# Patient Record
Sex: FEMALE | Race: BLACK OR AFRICAN AMERICAN | Employment: FULL TIME | URBAN - NONMETROPOLITAN AREA
[De-identification: names, ages, dates, MRNs, and addresses within clinical notes are randomized per-mention and may not be internally consistent; named-entity substitution may affect disease eponyms.]

---

## 2021-03-19 ENCOUNTER — HOSPITAL ENCOUNTER (EMERGENCY)
Age: 56
Discharge: HOME | End: 2021-03-19
Attending: EMERGENCY MEDICINE | Admitting: EMERGENCY MEDICINE
Payer: OTHER GOVERNMENT

## 2021-03-19 VITALS
HEIGHT: 60 IN | SYSTOLIC BLOOD PRESSURE: 132 MMHG | RESPIRATION RATE: 18 BRPM | DIASTOLIC BLOOD PRESSURE: 81 MMHG | BODY MASS INDEX: 30.23 KG/M2 | OXYGEN SATURATION: 98 % | TEMPERATURE: 98.3 F | WEIGHT: 154 LBS | HEART RATE: 74 BPM

## 2021-03-19 DIAGNOSIS — S61.214A LACERATION OF RIGHT RING FINGER WITHOUT FOREIGN BODY WITHOUT DAMAGE TO NAIL, INITIAL ENCOUNTER: Primary | ICD-10-CM

## 2021-03-19 PROCEDURE — 74011000250 HC RX REV CODE- 250: Performed by: EMERGENCY MEDICINE

## 2021-03-19 PROCEDURE — 90471 IMMUNIZATION ADMIN: CPT

## 2021-03-19 PROCEDURE — 74011250636 HC RX REV CODE- 250/636: Performed by: EMERGENCY MEDICINE

## 2021-03-19 PROCEDURE — 90714 TD VACC NO PRESV 7 YRS+ IM: CPT | Performed by: EMERGENCY MEDICINE

## 2021-03-19 PROCEDURE — 99283 EMERGENCY DEPT VISIT LOW MDM: CPT

## 2021-03-19 RX ORDER — AMLODIPINE BESYLATE 5 MG/1
5 TABLET ORAL DAILY
COMMUNITY
Start: 2021-02-16

## 2021-03-19 RX ORDER — BACITRACIN 500 UNIT/G
1 PACKET (EA) TOPICAL
Status: COMPLETED | OUTPATIENT
Start: 2021-03-19 | End: 2021-03-19

## 2021-03-19 RX ADMIN — BACITRACIN 1 PACKET: 500 OINTMENT TOPICAL at 19:26

## 2021-03-19 RX ADMIN — TETANUS AND DIPHTHERIA TOXOIDS ADSORBED 0.5 ML: 2; 2 INJECTION INTRAMUSCULAR at 19:24

## 2021-03-19 NOTE — ED TRIAGE NOTES
Pt cut right ring finger while hanging picture. Site is bleeding moderately. Pt rates pain at 10/10 with throbbing qualities.

## 2021-03-19 NOTE — ED PROVIDER NOTES
EMERGENCY DEPARTMENT HISTORY AND PHYSICAL EXAM      Date: 3/19/2021  Patient Name: Jared Lobo    History of Presenting Illness     Chief Complaint   Patient presents with    Laceration       History Provided By: Patient    HPI: Jared Lobo, 48 y.o. female with a past medical history significant hypertension presents to the ED with cc of finger laceration. Installing shelving suffered laceration to the right fourth finger. Minor abrasions to other fingers    There are no other complaints, changes, or physical findings at this time. PCP: No primary care provider on file. No current facility-administered medications on file prior to encounter. Current Outpatient Medications on File Prior to Encounter   Medication Sig Dispense Refill    amLODIPine (NORVASC) 5 mg tablet TAKE 1 TABLET BY MOUTH ONCE DAILY         Past History     Past Medical History:  Past Medical History:   Diagnosis Date    Hypertension        Past Surgical History:  Past Surgical History:   Procedure Laterality Date    HX ORTHOPAEDIC      Right knee       Family History:  History reviewed. No pertinent family history. Social History:  Social History     Tobacco Use    Smoking status: Never Smoker    Smokeless tobacco: Never Used   Substance Use Topics    Alcohol use: Never     Frequency: Never    Drug use: Not on file       Allergies:  No Known Allergies      Review of Systems   Review of all other systems negative  Review of Systems   Constitutional: Negative. HENT: Negative. Eyes: Negative. Respiratory: Negative for cough, chest tightness, shortness of breath and wheezing. Cardiovascular: Negative for chest pain, palpitations and leg swelling. Gastrointestinal: Negative for abdominal distention, abdominal pain, blood in stool, constipation, diarrhea, nausea and vomiting. Endocrine: Negative. Genitourinary: Negative for difficulty urinating, dysuria, flank pain, frequency and hematuria. Musculoskeletal: Negative. Neurological: Negative for dizziness, seizures, speech difficulty, weakness and numbness. Hematological: Negative. Psychiatric/Behavioral: Negative. Physical Exam   Pleasant middle-aged female no acute distress  Physical Exam  Vitals signs and nursing note reviewed. Constitutional:       Appearance: Normal appearance. She is not toxic-appearing. HENT:      Head: Normocephalic and atraumatic. Musculoskeletal:         General: Signs of injury present. Comments: 1 cm laceration distal fourth finger wound approximates well is not actively bleeding   Neurological:      Mental Status: She is alert. Lab and Diagnostic Study Results     Labs -   No results found for this or any previous visit (from the past 12 hour(s)). Radiologic Studies -   @lastxrresult@  CT Results  (Last 48 hours)    None        CXR Results  (Last 48 hours)    None            Medical Decision Making   - I am the first provider for this patient. - I reviewed the vital signs, available nursing notes, past medical history, past surgical history, family history and social history. - Initial assessment performed. The patients presenting problems have been discussed, and they are in agreement with the care plan formulated and outlined with them. I have encouraged them to ask questions as they arise throughout their visit. Vital Signs-Reviewed the patient's vital signs.   Patient Vitals for the past 12 hrs:   Temp Pulse Resp BP SpO2   03/19/21 1841     98 %   03/19/21 1838 98.3 °F (36.8 °C) 74 18 132/81 98 %       Records Reviewed: Nursing Notes and Old Medical Records    The patient presents with laceration finger with a differential diagnosis of foreign body tendon tendon injury vascular compromise      ED Course:          Provider Notes (Medical Decision Making):   1 cm laceration distal fourth finger wound approximates well will treat conservatively with dressing  MDM Procedures   Medical Decision Makingedical Decision Making  Performed by: Emily Toledo MD  PROCEDURES:none  Procedures       Disposition   Disposition: Condition stable  DC- Adult Discharges: All of the diagnostic tests were reviewed and questions answered. Diagnosis, care plan and treatment options were discussed. The patient understands the instructions and will follow up as directed. The patients results have been reviewed with them. They have been counseled regarding their diagnosis. The patient verbally convey understanding and agreement of the signs, symptoms, diagnosis, treatment and prognosis and additionally agrees to follow up as recommended with their PCP in 24 - 48 hours. They also agree with the care-plan and convey that all of their questions have been answered. I have also put together some discharge instructions for them that include: 1) educational information regarding their diagnosis, 2) how to care for their diagnosis at home, as well a 3) list of reasons why they would want to return to the ED prior to their follow-up appointment, should their condition change. DISCHARGE PLAN:  1. Current Discharge Medication List      CONTINUE these medications which have NOT CHANGED    Details   amLODIPine (NORVASC) 5 mg tablet TAKE 1 TABLET BY MOUTH ONCE DAILY           2. Follow-up Information    None       3. Return to ED if worse   4. Current Discharge Medication List            Diagnosis     Clinical Impression: Laceration finger no  suture  Attestations:    Emily Toledo MD    Please note that this dictation was completed with [employer], the computer voice recognition software. Quite often unanticipated grammatical, syntax, homophones, and other interpretive errors are inadvertently transcribed by the computer software. Please disregard these errors. Please excuse any errors that have escaped final proofreading. Thank you.

## 2021-04-28 ENCOUNTER — HOSPITAL ENCOUNTER (OUTPATIENT)
Dept: PHYSICAL THERAPY | Age: 56
Discharge: HOME | End: 2021-04-28
Payer: OTHER GOVERNMENT

## 2021-04-28 PROCEDURE — 97110 THERAPEUTIC EXERCISES: CPT

## 2021-04-28 PROCEDURE — 97161 PT EVAL LOW COMPLEX 20 MIN: CPT

## 2021-04-28 NOTE — PROGRESS NOTES
PT INITIAL EVALUATION NOTE - [facility] 2-15    Patient Name: Christy Orona  Date:2021  : 1965  [x]  Patient  Verified  Payor: SHOSHANA / Plan: Jhonny Chaudhary 74 / Product Type:  /    In time:1110  Out time:1155  Total Treatment Time (min): 45  Total Timed Codes (min): 15  1:1 Treatment Time ( [address] only): 45   Visit #: 1    Treatment Area: Pain in right knee [M25.561]    SUBJECTIVE  Pain Level (0-10 scale): 10/10  Any medication changes, allergies to medications, adverse drug reactions, diagnosis change, or new procedure performed?: [] No    [x] Yes (see summary sheet for update)  Subjective:    Pt reports she has struggled with knee pain off and on since  in her R knee. She has a debridement in  and has been treated with multiple injections and is now to the point where her surgeon has advised she undergo a right knee replacement. Her issues have been ongoing for so long however that her knee has become quite stiff and does not function well overall. Therefore the surgeon has suggested pre-op therapy to improve knee function to prevent the potential of post-operative complication. Her procedure is scheduled approximately 6 weeks from now.     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    Mechanism of Injury: degenerative  Previous Treatment/Compliance: good  Radiographs: X-rays show significant OA in the R knee  What increases symptoms: movement activity  What decreases symptoms: rest/ice  PMHx/Surgical Hx: pt has a history of right knee debridement surgery in  and has had multiple injections since that time  Past Medical History:   Diagnosis Date    Hypertension      Past Surgical History:   Procedure Laterality Date    HX ORTHOPAEDIC      Right knee     Work Hx: works as a home care aid  Living Situation: lives with    Pt Goals: to be able to walk and work without pain  Barriers: none  Motivation: good Substance use: none noted   Cognition: A & O x 4    Fall Assessment: none needed        OBJECTIVE/EXAMINATION  Posture:  WFL  Gait and Functional Mobility:  Antalgic right   Palpation: severe palpatory tenderness along the lateral aspect of the right knee    Hip:  Strength AROM PROM     Right Left Right Left Right Left    Flexion 4/5         Extension 3+/5         Abduction 4/5         Adduction 4+/5        Knee:  Strength AROM PROM     Right Left Right Left Right Left    Flexion 4/5  71  90     Extension 4/5  0  0      *All strength measures are on a scale with 5 as a maximum, if a space is left blank it was not tested. All ROM measurements are measured in degrees.     Joint Mobility Assessment: Pt has significant pain, tenderness, and stiffness along the lateral aspect of the joint    Flexibility: poor with flexion    Special Tests: Julienne: positive right                       15 min Therapeutic Exercise:  [x] See flow sheet :   Rationale: increase ROM and increase strength to improve the patients ability to perform functional mobility without pain and decrease stiffness pre-op          With   [x] TE   [] TA   [] neuro   [] other: Patient Education: [x] Provided HEP    [x] Progressed/Changed HEP based on:   [] positioning   [] body mechanics   [] transfers   [] heat/ice application    [] other:        Pain Level (0-10 scale) post treatment: 8/10    ASSESSMENT/Changes in Function:   [x]  See Plan of [address] Ch[address] Rios PT, DPT 4/28/2021

## 2021-04-28 NOTE — PROGRESS NOTES
[address], [address]  Ph: [phone]    Fax: [phone]    Plan of Care/Statement of Necessity for Physical Therapy Services  2-15    Patient name: Lexa Walton  : 1965  Provider#: [id number]  Referral source: Henrique Prieto MD      Medical/Treatment Diagnosis: Pain in right knee [M25.561]     Prior Hospitalization: see medical history     Comorbidities: see medical history  Prior Level of Function: independent community ambulator without AD need  Medications: Verified on Patient Summary List  Start of Care: 2021      Onset Date: chronic since    The Plan of Care and following information is based on the information from the initial evaluation. Assessment/ key information: Pt presents to outpatient physical therapy with right knee pain and stiffness.   Imaging has already indicated    Evaluation Complexity History LOW Complexity : Zero comorbidities / personal factors that will impact the outcome / POC; Examination LOW Complexity : 1-2 Standardized tests and measures addressing body structure, function, activity limitation and / or participation in recreation  ;Presentation LOW Complexity : Stable, uncomplicated  ;Clinical Decision Making TUG Score: 10 seconds  Overall Complexity Rating: LOW     Problem List: pain affecting function, decrease ROM, decrease strength, impaired gait/ balance, decrease activity tolerance and decrease flexibility/ joint mobility   Treatment Plan may include any combination of the following: Therapeutic exercise, Therapeutic activities, Neuromuscular re-education, Physical agent/modality, Gait/balance training, Manual therapy, Patient education, Functional mobility training and Stair training  Patient / Family readiness to learn indicated by: asking questions, trying to perform skills and interest  Persons(s) to be included in education: patient (P)  Barriers to Learning/Limitations: None  Patient Goal (s): to be able to work without pain  Patient Self Reported Health Status: excellent  Rehabilitation Potential: good    Short Term Goals: To be accomplished in 6 treatments:  1)  Pt to be independent with HEP  2)  Pt to improve R knee flexion by 10 degrees so that she can ambulate without an antalgic pattern    Long Term Goals: To be accomplished in 12 treatments:  1)  Pt to improve R LE Strength to WNL so 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 Patient/ Caregiver education and instruction: activity modification and exercises    [x]  Plan of care has been reviewed with PTA        Certification Period: 2021 to 2021    Holly Sifuentes, PT, DPT 2021     ________________________________________________________________________    I certify that the above Therapy Services are being furnished while the patient is under my care. I agree with the treatment plan and certify that this therapy is necessary.     Physician's Signature:____________________  Date:____________Time: _________    Patient name: Khalida Maloney  : 1965  Provider#: [id number]

## 2021-05-03 ENCOUNTER — HOSPITAL ENCOUNTER (OUTPATIENT)
Dept: PHYSICAL THERAPY | Age: 56
Discharge: HOME | End: 2021-05-03
Payer: OTHER GOVERNMENT

## 2021-05-03 PROCEDURE — 97016 VASOPNEUMATIC DEVICE THERAPY: CPT

## 2021-05-03 PROCEDURE — 97110 THERAPEUTIC EXERCISES: CPT

## 2021-05-03 NOTE — PROGRESS NOTES
PT DAILY TREATMENT NOTE - [facility] 2-15    Patient Name: Carola Banuelos  Date:5/3/2021  : 1965  [x]  Patient  Verified  Payor: SHOSHANA / Plan: Jhonny Chaudhary 74 / Product Type:  /    In time:1101  Out time:1158  Total Treatment Time (min): 57  Total Timed Codes (min): 57  1:1 Treatment Time ( only): 62   Visit #:  2    Treatment Area: Pain in right knee [M25.561]    SUBJECTIVE  Pain Level (0-10 scale): 7/10  Any medication changes, allergies to medications, adverse drug reactions, diagnosis change, or new procedure performed?: [x] No    [] Yes (see summary sheet for update)  Subjective functional status/changes:   [] No changes reported  \"That knee still hurts, even with the medicine I took this morning. \"    OBJECTIVE    Modality rationale: decrease edema, decrease inflammation, decrease pain, increase tissue extensibility and increase muscle contraction/control to improve the patients ability to ambulate without pain.    Min Type Additional Details       [] Estim: []Att   []Unatt    []TENS instruct                  []IFC  []Premod   []NMES                    []Other:  []w/US      []w/ heat  []w/ ice  Position:  Location:       []  Traction: [] Cervical       []Lumbar                       [] Prone          []Supine                       []Intermittent   []Continuous Lbs:  [] before manual  [] after manual  [] w/ heat  [] Simultaneously performed with w/ Estim    []  Ultrasound: []Continuous   [] Pulsed                       at: []1MHz   []3MHz Location:  W/cm2:    [] Paraffin         Location:   []w/heat    []  Ice     []  Heat  []  Ice massage Position:  Location:    []  Laser  []  Other: Position:  Location:      10 [x]  Vasopneumatic Device Pressure:       [] lo [x] med [] hi   [x] w/ ice      Temperature:  37  [] Simultaneously performed with w/ Estim     [x] Skin assessment post-treatment:  [x]intact []redness- no adverse reaction     []redness  adverse reaction:     57 min Therapeutic Exercise:  [] See flow sheet :   Rationale: increase ROM, increase strength, improve coordination, improve balance and increase proprioception to improve the patients ability to ambulate without pain. With   [x] TE   [] TA   [] neuro   [] other: Patient Education: [x] Review HEP    [] Progressed/Changed HEP based on:   [] positioning   [] body mechanics   [] transfers   [] heat/ice application    [] other:      Other Objective/Functional Measures: Cues required for correct technique during HEP review. Addition of marching on trampoline for warm-up and endurance training. Pain Level (0-10 scale) post treatment: 0/10    ASSESSMENT/Changes in Function:   The pt tolerated treatment treatment well today. HEP reviewed to facilitate compliance. Primary deficits are with right knee PROM flexion. Patient will continue to benefit from skilled PT services to modify and progress therapeutic interventions, address functional mobility deficits, address ROM deficits, address strength deficits, analyze and address soft tissue restrictions, analyze and modify body mechanics/ergonomics, address imbalance/dizziness and instruct in home and community integration to attain remaining goals     [x]  See Plan of Care  []  See progress note/recertification  []  See Discharge Summary         Progress towards goals / Updated goals:  Short Term Goals: To be accomplished in 6 treatments:  1)  Pt to be independent with HEP  2)  Pt to improve R knee flexion by 10 degrees so that she can ambulate without an antalgic pattern     Long Term Goals: To be accomplished in 12 treatments:  1)  Pt to improve R LE Strength to WNL so 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     PLAN  [x] Upgrade activities as tolerated     [x]  Continue plan of care  []  Update interventions per flow sheet       []  Discharge due to:_  []  Other:_      Eric Guzman, PT, DPT 5/3/2021

## 2021-05-06 ENCOUNTER — HOSPITAL ENCOUNTER (OUTPATIENT)
Dept: PHYSICAL THERAPY | Age: 56
Discharge: HOME | End: 2021-05-06
Payer: OTHER GOVERNMENT

## 2021-05-06 PROCEDURE — 97110 THERAPEUTIC EXERCISES: CPT

## 2021-05-06 PROCEDURE — 97014 ELECTRIC STIMULATION THERAPY: CPT

## 2021-05-06 PROCEDURE — 97016 VASOPNEUMATIC DEVICE THERAPY: CPT

## 2021-05-06 NOTE — PROGRESS NOTES
PT DAILY TREATMENT NOTE - [facility] 2-15    Patient Name: Alexa Pretty  Date:2021  : 1965  [x]  Patient  Verified  Payor:  / Plan: Messi Chapman / Product Type:  /    In time: 1:00 PM  Out time:1:55 PM  Total Treatment Time (min): 55  Total Timed Codes (min): 45  1:1 Treatment Time ( [address] only): 39   Visit #:  3    Treatment Area: Pain in right knee [M25.561]    SUBJECTIVE  Pain Level (0-10 scale): 0  Any medication changes, allergies to medications, adverse drug reactions, diagnosis change, or new procedure performed?: [x] No    [] Yes (see summary sheet for update)  Subjective functional status/changes:   [] No changes reported     I have to loosen my right knee before the knee replacement surgery. Otherwise, MD said the surgery will not work. I return to MD on 2021.      OBJECTIVE    Modality rationale: decrease pain and increase tissue extensibility to improve the patients ability toimprove R knee flexion by 10 degrees so that she can ambulate without an antalgic pattern     Min Type Additional Details       [] Estim: []Att   []Unatt    []TENS instruct                  []IFC  []Premod   []NMES                     []Other:  []w/US   []w/ice   []w/heat  Position:  Location:       []  Traction: [] Cervical       []Lumbar                       [] Prone          []Supine                       []Intermittent   []Continuous Lbs:  [] before manual  [] after manual  []w/heat    []  Ultrasound: []Continuous   [] Pulsed                       at: []1MHz   []3MHz Location:  W/cm2:    [] Paraffin         Location:   []w/heat    []  Ice     []  Heat  []  Ice massage Position:  Location:    []  Laser  []  Other: Position:  Location:     10 [x]  Vasopneumatic Device Pressure:       [x] lo [] med [] hi   Temperature: 34     [x] Skin assessment post-treatment:  [x]intact []redness- no adverse reaction    []redness  adverse reaction:     45 min Therapeutic Exercise:  [x] See flow sheet : Rationale: increase ROM, increase strength, improve coordination, improve balance and increase proprioception    to improve the patients ability to improve R knee flexion by 10 degrees so that she can ambulate without an antalgic pattern            With   [] TE   [] TA   [] Neuro   [] SC   [] other: Patient Education: [x] Review HEP    [] Progressed/Changed HEP based on:   [] positioning   [] body mechanics   [] transfers   [] heat/ice application    [] other:           Pain Level (0-10 scale) post treatment: 0    ASSESSMENT/Changes in Function:   Patient was experiencing no pain after treatment. Continue treatment with exercises in preparation for total knee replacement. Patient will continue to benefit from skilled PT services to address ROM deficits, address strength deficits and analyze and address soft tissue restrictions to attain remaining goals. 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     PLAN  []  Upgrade activities as tolerated     [x]  Continue plan of care  []  Update interventions per flow sheet       []  Discharge due to:_  []  Other:_      Barbara Mcmahan, PT 5/6/2021

## 2021-05-10 ENCOUNTER — HOSPITAL ENCOUNTER (OUTPATIENT)
Dept: PHYSICAL THERAPY | Age: 56
Discharge: HOME | End: 2021-05-10
Payer: OTHER GOVERNMENT

## 2021-05-10 PROCEDURE — 97110 THERAPEUTIC EXERCISES: CPT

## 2021-05-10 PROCEDURE — 97016 VASOPNEUMATIC DEVICE THERAPY: CPT

## 2021-05-10 NOTE — PROGRESS NOTES
PT DAILY TREATMENT NOTE - [facility] 2-15    Patient Name: Debora Ramírez  Date:5/10/2021  : 1965  [x]  Patient  Verified  Payor:  / Plan: Jhonny Chaudhary 75 / Product Type:  /    In time:11:02  Out time:12:02  Total Treatment Time (min): 60  Total Timed Codes (min): 50  1:1 Treatment Time ( [address] only): 50   Visit #: 4    Treatment Area: Pain in right knee [M25.561]    SUBJECTIVE  Pain Level (0-10 scale): 0/10  Any medication changes, allergies to medications, adverse drug reactions, diagnosis change, or new procedure performed?: [x] No    [] Yes (see summary sheet for update)  Subjective functional status/changes:   [x] No changes reported    OBJECTIVE  Modality rationale: decrease edema, decrease inflammation and decrease pain to improve the patients ability to perform ADLs with reduced pain.    Min Type Additional Details       [] Estim: []Att   []Unatt    []TENS instruct                  []IFC  []Premod   []NMES                    []Other:  []w/US      []w/ heat  []w/ ice  Position:  Location:       []  Traction: [] Cervical       []Lumbar                       [] Prone          []Supine                       []Intermittent   []Continuous Lbs:  [] before manual  [] after manual  [] w/ heat  [] Simultaneously performed with w/ Estim    []  Ultrasound: []Continuous   [] Pulsed                       at: []1MHz   []3MHz Location:  W/cm2:    [] Paraffin         Location:   []w/heat    []  Ice     []  Heat  []  Ice massage Position:  Location:    []  Laser  []  Other: Position:  Location:     10 [x]  Vasopneumatic Device Pressure:       [] lo [] med [x] hi   [x] w/ ice      Temperature: 35  [] Simultaneously performed with w/ Estim     [x] Skin assessment post-treatment:  [x]intact [x]redness- no adverse reaction     []redness  adverse reaction:     50 min Therapeutic Exercise:  [x] See flow sheet :   Rationale: increase ROM and increase strength to improve the patients ability to perform ADLs with reduced pain. With   [x] TE   [] TA   [] neuro   [] other: Patient Education: [x] Review HEP    [] Progressed/Changed HEP based on:   [] positioning   [] body mechanics   [] transfers   [] heat/ice application    [] other:      Pain Level (0-10 scale) post treatment: 0/10    ASSESSMENT/Changes in Function:   The pt tolerated treatment well. Pt is progressing well with AROM and strength of R knee for pre-operative management, but continues to have greater deficits with R knee flexion AROM more so than extension. Emphasis of treatment was on stretches and exercises to focus on promoting improved R knee ROM and flexibility. Pt requires verbal cueing for proper form with exercises for ideal stretch positioning and for new exercises. Continue to progress as able.  Patient will continue to benefit from skilled PT services to modify and progress therapeutic interventions, address functional mobility deficits, address ROM deficits, address strength deficits, analyze and address soft tissue restrictions, analyze and cue movement patterns and analyze and modify body mechanics/ergonomics to attain remaining goals     [x]  See Plan of Care  []  See progress note/recertification  []  See Discharge Summary         Progress towards goals / Updated goals:  Short Term Goals: To be accomplished in 6 treatments:  1)  Pt to be independent with HEP - Met  2)  Pt to improve R knee flexion by 10 degrees so that she can ambulate without an antalgic pattern     Long Term Goals: To be accomplished in 12 treatments:  1)  Pt to improve R LE Strength to WNL 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

## 2021-05-13 ENCOUNTER — HOSPITAL ENCOUNTER (OUTPATIENT)
Dept: PHYSICAL THERAPY | Age: 56
Discharge: HOME | End: 2021-05-13
Payer: OTHER GOVERNMENT

## 2021-05-13 PROCEDURE — 97110 THERAPEUTIC EXERCISES: CPT

## 2021-05-13 NOTE — PROGRESS NOTES
PT DAILY TREATMENT NOTE 2-15    Patient Name: Oswald Montano  Date:2021  : 1965  [x]  Patient  Verified  Payor: SHOSHANA / Plan: Jhonny Chaudhary 75 / Product Type:  /    In time: 1:00  Out time: 2:05  Total Treatment Time (min): 65  Visit #:  5    Treatment Area: Pain in right knee [M25.561]    SUBJECTIVE  Pain Level (0-10 scale): 0/10  Any medication changes, allergies to medications, adverse drug reactions, diagnosis change, or new procedure performed?: [x] No    [] Yes (see summary sheet for update)  Subjective functional status/changes:   [] No changes reported  \"I've been working on bending my knee.  I see the  .\"    OBJECTIVE    Modality rationale: decrease pain and increase tissue extensibility to improve the patients ability to be able to perform AROM   Min Type Additional Details       [] Estim: []Att   []Unatt    []TENS instruct                  []IFC  []Premod   []NMES                    []Other:  []w/US      []w/ heat  []w/ ice  Position:  Location:       []  Traction: [] Cervical       []Lumbar                       [] Prone          []Supine                       []Intermittent   []Continuous Lbs:  [] before manual  [] after manual  [] w/ heat  [] Simultaneously performed with w/ Estim    []  Ultrasound: []Continuous   [] Pulsed                       at: []1MHz   []3MHz Location:  W/cm2:    [] Paraffin         Location:   []w/heat   10 []  Ice     [x]  Heat  []  Ice massage Position: seated  Location: R knee    []  Laser  []  Other: Position:  Location:      []  Vasopneumatic Device Pressure:       [] lo [] med [] hi   [] w/ ice      Temperature:   [] Simultaneously performed with w/ Estim     [x] Skin assessment post-treatment:  [x]intact [x]redness- no adverse reaction    []redness  adverse reaction:       55 min Therapeutic Exercise:  [x] See flow sheet :   Rationale: increase ROM and increase strength to improve the patients ability to improve functional mobility         With   [x] TE   [] TA   [] neuro   [] other: Patient Education: [x] Review HEP    [] Progressed/Changed HEP based on:   [] positioning   [] body mechanics   [] transfers   [] heat/ice application    [] other:      Other Objective/Functional Measures: Added Biodex PROM    Pain Level (0-10 scale) post treatment: 0/10    ASSESSMENT/Changes in Function: Patient tolerated treatment fairly well. Focus on knee flexion due to increased limitations - extension is good. Trial of moist heat to R knee today instead of ice. Patient will continue to benefit from skilled PT services to address functional mobility deficits, address ROM deficits and address strength deficits to attain remaining goals.      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

## 2021-05-17 ENCOUNTER — HOSPITAL ENCOUNTER (OUTPATIENT)
Dept: PHYSICAL THERAPY | Age: 56
Discharge: HOME | End: 2021-05-17
Payer: OTHER GOVERNMENT

## 2021-05-17 PROCEDURE — 97110 THERAPEUTIC EXERCISES: CPT

## 2021-05-17 PROCEDURE — 97016 VASOPNEUMATIC DEVICE THERAPY: CPT

## 2021-05-17 PROCEDURE — 97140 MANUAL THERAPY 1/> REGIONS: CPT

## 2021-05-17 NOTE — PROGRESS NOTES
PT DAILY TREATMENT NOTE 2-15    Patient Name: Vaibhav Millard  Date:2021  : 1965  [x]  Patient  Verified  Payor:  / Plan: [facility] / Product Type:  /    In time: 11:02  Out time:12:10  Total Treatment Time (min): 68  Visit #: 6    Treatment Area: Pain in right knee [M25.561]    SUBJECTIVE  Pain Level (0-10 scale): 10/10  Any medication changes, allergies to medications, adverse drug reactions, diagnosis change, or new procedure performed?: [x] No    [] Yes (see summary sheet for update)  Subjective functional status/changes:   [] No changes reported  Pt reports that her R knee was swollen and painful all weekend. OBJECTIVE  Modality rationale: decrease edema, decrease inflammation and decrease pain to improve the patients ability to ambulate with improved AROM.    Min Type Additional Details       [] Estim: []Att   []Unatt    []TENS instruct                  []IFC  []Premod   []NMES                    []Other:  []w/US      []w/ heat  []w/ ice  Position:  Location:       []  Traction: [] Cervical       []Lumbar                       [] Prone          []Supine                       []Intermittent   []Continuous Lbs:  [] before manual  [] after manual  [] w/ heat  [] Simultaneously performed with w/ Estim    []  Ultrasound: []Continuous   [] Pulsed                       at: []1MHz   []3MHz Location:  W/cm2:    [] Paraffin         Location:   []w/heat    []  Ice     []  Heat  []  Ice massage Position:  Location:    []  Laser  []  Other: Position:  Location:     10 [x]  Vasopneumatic Device Pressure:       [] lo [] med [x] hi   [x] w/ ice      Temperature: 35  [] Simultaneously performed with w/ Estim     [x] Skin assessment post-treatment:  [x]intact [x]redness- no adverse reaction    []redness  adverse reaction:     48 min Therapeutic Exercise:  [x] See flow sheet :   Rationale: increase ROM and increase strength to improve the patients ability to ambulate with improved R knee AROM. 10 min Manual Therapy: Tibiofemoral jt distraction and knee flexion PROM provided to R knee. Muscle energy technique performed to quadriceps muscle to enhance knee flexion. Rationale: increase ROM and increase tissue extensibility  to improve the patients ability to ambulate with improved R knee AROM. With   [x] TE   [] TA   [] neuro   [] other: Patient Education: [x] Review HEP    [] Progressed/Changed HEP based on:   [] positioning   [] body mechanics   [] transfers   [] heat/ice application    [] other:      Other Objective Measures: Palpable quadriceps tightness noted on R compared to L. Increased hold time with heel slides to address quadriceps tightness. Pain Level (0-10 scale) post treatment: 0/10    ASSESSMENT/Changes in Function:   Patient tolerated treatment fairly well. Pt demonstrates limited knee flexion AROM today with exercises due to increased pain and stiffness over the weekend. Emphasis of treatment was on enhancing knee AROM, especially knee flexion, with the utilization of exercises as well as manual therapy techniques. Continue to progress as able with decreasing quadriceps tightness and enhancing R knee flexion AROM. Patient will continue to benefit from skilled PT services to modify and progress therapeutic interventions, address functional mobility deficits, address ROM deficits, address strength deficits, analyze and address soft tissue restrictions, analyze and cue movement patterns and analyze and modify body mechanics/ergonomics to attain remaining goals.      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

## 2021-05-20 ENCOUNTER — HOSPITAL ENCOUNTER (OUTPATIENT)
Dept: PHYSICAL THERAPY | Age: 56
Discharge: HOME | End: 2021-05-20
Payer: OTHER GOVERNMENT

## 2021-05-20 PROCEDURE — 97016 VASOPNEUMATIC DEVICE THERAPY: CPT

## 2021-05-20 PROCEDURE — 97140 MANUAL THERAPY 1/> REGIONS: CPT

## 2021-05-20 PROCEDURE — 97110 THERAPEUTIC EXERCISES: CPT

## 2021-05-20 NOTE — PROGRESS NOTES
PT DAILY TREATMENT NOTE 2-15    Patient Name: Salena Bowie  Date:2021  : 1965  [x]  Patient  Verified  Payor:  / Plan: Jhonny Chaudhary 74 / Product Type:  /    In time:1105 am   Out time:1208pm  Total Treatment Time (min): 63  Visit #: 7    Treatment Area: Pain in right knee [M25.561]    SUBJECTIVE  Pain Level (0-10 scale): 5/10  Any medication changes, allergies to medications, adverse drug reactions, diagnosis change, or new procedure performed?: [x] No    [] Yes (see summary sheet for update)  Subjective functional status/changes:   [] No changes reported  \"Pt reports still having issues with swelling and even with her working on her ex regularly. \"    OBJECTIVE      Modality rationale: decrease edema, decrease inflammation and decrease pain to improve the patients ability to increase AROM of R knee   Min Type Additional Details       [] Estim: []Att   []Unatt    []TENS instruct                  []IFC  []Premod   []NMES                    []Other:  []w/US      []w/ heat  []w/ ice  Position:  Location:       []  Traction: [] Cervical       []Lumbar                       [] Prone          []Supine                       []Intermittent   []Continuous Lbs:  [] before manual  [] after manual  [] w/ heat  [] Simultaneously performed with w/ Estim    []  Ultrasound: []Continuous   [] Pulsed                       at: []1MHz   []3MHz Location:  W/cm2:    [] Paraffin         Location:   []w/heat    []  Ice     []  Heat  []  Ice massage Position:  Location:    []  Laser  []  Other: Position:  Location:     10 [x]  Vasopneumatic Device Pressure:       [] lo [] med [x] hi   [x] w/ ice      Temperature: 40  [] Simultaneously performed with w/ Estim     [x] Skin assessment post-treatment:  [x]intact []redness- no adverse reaction    []redness  adverse reaction:       31 min Therapeutic Exercise:  [x] See flow sheet :   Rationale: increase ROM and increase strength to improve the patients ability to increase Knee active motion and function      20 min Manual Therapy:  Hawk  and patella mobilization    Rationale: increase ROM and increase tissue extensibility  to improve the patients ability to increase knee motion      With   [] TE   [] TA   [] neuro   [] other: Patient Education: [x] Review HEP    [] Progressed/Changed HEP based on:   [] positioning   [] body mechanics   [] transfers   [] heat/ice application    [] other:      Pain Level (0-10 scale) post treatment: 0/10    ASSESSMENT/Changes in Function:   Patient tolerated treatment session with increase focus on quad stretching and patella movement to increase active knee flex. Note patella initially had no glide lateral of distal/proximal. There was audible crepitus with initial stretching which decreased as stretching progressed, instructed pt and had her demonstrate patella stretching for home follow through. Pt also had some tenderness with hawk  to quad however this decreased as application progressed. Pt notes compliance with HEP. Patient will continue to benefit from skilled PT services to modify and progress therapeutic interventions, address functional mobility deficits, address ROM deficits and address strength deficits to attain remaining goals.      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

## 2021-05-26 ENCOUNTER — HOSPITAL ENCOUNTER (OUTPATIENT)
Dept: PHYSICAL THERAPY | Age: 56
Discharge: HOME | End: 2021-05-26
Payer: OTHER GOVERNMENT

## 2021-05-26 PROCEDURE — 97016 VASOPNEUMATIC DEVICE THERAPY: CPT

## 2021-05-26 PROCEDURE — 97110 THERAPEUTIC EXERCISES: CPT

## 2021-05-26 NOTE — PROGRESS NOTES
PT DAILY TREATMENT NOTE 2-15    Patient Name: Salena Bowie  Date:2021  : 1965  [x]  Patient  Verified  Payor:  / Plan: [facility] / Product Type:  /    In time:11:00 AM  Out time:1:55 AM  Total Treatment Time (min): 55  Visit #:  8    Treatment Area: Pain in right knee [M25.561]    SUBJECTIVE  Pain Level (0-10 scale): 8  Any medication changes, allergies to medications, adverse drug reactions, diagnosis change, or new procedure performed?: [x] No    [] Yes (see summary sheet for update)    Subjective functional status/changes:   [] No changes reported    My knee has been locking and I have to wait to walk when I get up from the chair.     OBJECTIVE    Modality rationale: decrease pain to improve the patients ability to improve R knee flexion by 10 degrees so that she can ambulate without an antalgic pattern.        Min Type Additional Details       [] Estim: []Att   []Unatt    []TENS instruct                  []IFC  []Premod   []NMES                     []Other:  []w/US   []w/ice   []w/heat  Position:  Location:       []  Traction: [] Cervical       []Lumbar                       [] Prone          []Supine                       []Intermittent   []Continuous Lbs:  [] before manual  [] after manual  []w/heat    []  Ultrasound: []Continuous   [] Pulsed                       at: []1MHz   []3MHz Location:  W/cm2:    [] Paraffin         Location:   []w/heat    []  Ice     []  Heat  []  Ice massage Position:  Location:    []  Laser  []  Other: Position:  Location:   10   []  Vasopneumatic Device Pressure:       [x] lo [] med [] hi   Temperature: 34     [x] Skin assessment post-treatment:  [x]intact []redness- no adverse reaction    []redness  adverse reaction:         45 min Therapeutic Exercise:  [x] See flow sheet :   Rationale: increase ROM, increase strength, improve coordination, improve balance and increase proprioception to improve the patients ability to  improve R knee flexion by 10 degrees so that she can ambulate without an antalgic pattern.              With   [] TE   [] TA   [] Neuro   [] SC   [] other: Patient Education: [x] Review HEP    [] Progressed/Changed HEP based on:   [] positioning   [] body mechanics   [] transfers   [] heat/ice application    [] other:      Other Objective/Functional Measures: Exercise for right  knee flexion and stretching. Pain Level (0-10 scale) post treatment: 0    ASSESSMENT/Changes in Function:   Patient will be returning to [place] on [place] 4, 2021 for pre-op for her surgery. Patient continues to have decreased right knee range of motion. Continue with range of motion ans stretching exercises as tolerated. Patient will continue to benefit from skilled PT services to address ROM deficits, address strength deficits and analyze and address soft tissue restrictions to attain remaining goals.      [x]  See Plan of Care  []  See progress note/recertification  []  See Discharge Summary         Progress towards goals / Updated goals:    Short Term Goals: To be accomplished in 6 treatments:  1)  Pt to be independent with HEP - Met  2)  Pt to improve R knee flexion by 10 degrees so that she can ambulate without an antalgic pattern.     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

## 2021-05-27 ENCOUNTER — HOSPITAL ENCOUNTER (OUTPATIENT)
Dept: PHYSICAL THERAPY | Age: 56
Discharge: HOME | End: 2021-05-27
Payer: OTHER GOVERNMENT

## 2021-05-27 PROCEDURE — 97110 THERAPEUTIC EXERCISES: CPT

## 2021-05-27 NOTE — PROGRESS NOTES
PT DAILY TREATMENT NOTE 2-15    Patient Name: Aaron Pendleton  Date:2021  : 1965  [x]  Patient  Verified  Payor: SHOSHANA / Plan: Jhonny Chaudhary 74 / Product Type:  /    In time:11:10 AM  Out time: 1135  Total Treatment Time (min): 25  Visit #:  9    Treatment Area: Pain in right knee [M25.561]    SUBJECTIVE  Pain Level (0-10 scale): 0/10  Any medication changes, allergies to medications, adverse drug reactions, diagnosis change, or new procedure performed?: [x] No    [] Yes (see summary sheet for update)    Subjective functional status/changes:   [] No changes reported    \"My knee has been locking and I have to wait to walk when I get up from the chair. I am scheduled for surgery on 2021. \"    OBJECTIVE      25 min Therapeutic Exercise:  [x] See flow sheet :   Rationale: increase ROM, increase strength, improve coordination, improve balance and increase proprioception to improve the patients ability to  improve R knee flexion by 10 degrees so that she can ambulate without an antalgic pattern.              With   [x] TE   [] TA   [] Neuro   [] SC   [] other: Patient Education: [x] Review HEP    [] Progressed/Changed HEP based on:   [] positioning   [] body mechanics   [] transfers   [] heat/ice application    [] other:      Other Objective/Functional Measures: Exercise for right  knee flexion and stretching.       Posture:  WFL   Gait and Functional Mobility:  Antalgic right   Palpation: severe palpatory tenderness along the lateral aspect of the right knee and HS insertion               Hip:   Strength AROM PROM       Right Left Right Left Right Left     Flexion 5/5               Extension 3+/5               Abduction 4/5               Adduction 4+/5             Knee:   Strength AROM PROM       Right Left Right Left Right Left     Flexion 5/5   79   82       Extension 4/5   0   0        *All strength measures are on a scale with 5 as a maximum, if a space is left blank it was not tested. All ROM measurements are measured in degrees.     Joint Mobility Assessment: Pt has significant pain, tenderness, and stiffness along the lateral aspect of the joint     Flexibility: poor with flexion     Special Tests: Julienne: positive right      Pain Level (0-10 scale) post treatment: 0/10    ASSESSMENT/Changes in Function:     Patient will be returning to [place] on June 4, 2021 for pre-op for her surgery scheduled for 6/28/2021. Patient has received skilled physical therapy interventions including strengthening, tibiofemoral joint mobility, rom, endurance, strengthening, balance, and functional and work related activities. Increased focus has been on decreasing quad tightness, increasing right knee flexion, and gaining strength prior to scheduled TKA. Patient has demonstrated overall progress in rom and strength, however continues with pain and locking within the left knee joint. Pt has recently began using a SPC with gait due to high fall risk, leg giving out on her, and knee locking up. Home exercise program reviewed today with no concerns voiced by patient. Patient to be discharged at this time secondary to compliance with HEP leading up to scheduled TKA. Thank you for this referral.    [x]  See Plan of Care  []  See progress note/recertification  [x]  See Discharge Summary         Progress towards goals / Updated goals:    Short Term Goals: To be accomplished in 6 treatments:  1)  Pt to be independent with HEP - Met  2)  Pt to improve R knee flexion by 10 degrees so that she can ambulate without an antalgic pattern. 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  Partially Met    PLAN  []  Upgrade activities as tolerated []  Continue plan of care  []  Update interventions per flow sheet       [x]  Discharge due to:_ compliance with HEP  []  Other:_      Syl Espinoza PT, DPT  5/27/2021

## 2021-05-27 NOTE — PROGRESS NOTES
[address]  [place], [place]  Ph: [phone]    Fax: [phone]    Discharge Summary  2-15    Patient name: Celeste Carias  : 1965  Provider#: [id number]  Referral source: bUaldo Kearney MD      Medical/Treatment Diagnosis: Pain in right knee [M25.561]     Prior Hospitalization: see medical history     Comorbidities: See Plan of Care  Prior Level of Function:See Plan of Care  Medications: Verified on Patient Summary List    Start of Care: 2021      Onset Date:chronic since    Visits from Start of Care: 9 Missed Visits: 9  Reporting Period : 2021 to 2021      ASSESSMENT/SUMMARY OF CARE: Patient will be returning to [facility] on 2021 for pre-op for her surgery scheduled for 2021. Patient has received skilled physical therapy interventions including strengthening, tibiofemoral joint mobility, rom, endurance, strengthening, balance, and functional and work related activities. Increased focus has been on decreasing quad tightness, increasing right knee flexion, and gaining strength prior to scheduled TKA. Patient has demonstrated overall progress in rom and strength, however continues with pain and locking within the left knee joint. Pt has recently began using a SPC with gait due to high fall risk, leg giving out on her, and knee locking up. Home exercise program reviewed today with no concerns voiced by patient. Patient to be discharged at this time secondary to compliance with HEP leading up to scheduled TKA. Thank you for this referral.    [x]  See Plan of Care  []  See progress note/recertification  [x]  See Discharge Summary         Progress towards goals / Updated goals:    Short Term Goals: To be accomplished in 6 treatments:  1)  Pt to be independent with HEP - Met  2)  Pt to improve R knee flexion by 10 degrees so that she can ambulate without an antalgic pattern.  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  Partially Met        RECOMMENDATIONS:  [x]Discontinue therapy: [x]Patient has reached or is progressing toward set goals      []Patient is non-compliant or has abdicated      []Due to lack of appreciable progress towards set goals      []Other    Oc Reyes, PT, DPT  5/27/2021

## 2021-06-01 ENCOUNTER — APPOINTMENT (OUTPATIENT)
Dept: PHYSICAL THERAPY | Age: 56
End: 2021-06-01

## 2021-06-03 ENCOUNTER — APPOINTMENT (OUTPATIENT)
Dept: PHYSICAL THERAPY | Age: 56
End: 2021-06-03

## 2021-06-14 ENCOUNTER — HOSPITAL ENCOUNTER (OUTPATIENT)
Dept: PREADMISSION TESTING | Age: 56
Discharge: HOME | End: 2021-06-14
Payer: OTHER GOVERNMENT

## 2021-06-14 ENCOUNTER — HOSPITAL ENCOUNTER (OUTPATIENT)
Dept: GENERAL RADIOLOGY | Age: 56
Discharge: HOME | End: 2021-06-14
Attending: ORTHOPAEDIC SURGERY
Payer: OTHER GOVERNMENT

## 2021-06-14 VITALS
HEART RATE: 68 BPM | OXYGEN SATURATION: 100 % | HEIGHT: 60 IN | WEIGHT: 157.85 LBS | TEMPERATURE: 98.3 F | BODY MASS INDEX: 30.99 KG/M2 | RESPIRATION RATE: 16 BRPM | DIASTOLIC BLOOD PRESSURE: 71 MMHG | SYSTOLIC BLOOD PRESSURE: 120 MMHG

## 2021-06-14 LAB
ABO + RH BLD: NORMAL
ANION GAP SERPL CALC-SCNC: 7 MMOL/L (ref 5–15)
APPEARANCE UR: CLEAR
APTT PPP: 29.2 SEC (ref 21.2–34.1)
ATRIAL RATE: 59 BPM
BACTERIA URNS QL MICRO: NEGATIVE /HPF
BILIRUB UR QL: NEGATIVE
BLOOD GROUP ANTIBODIES SERPL: NEGATIVE
BUN SERPL-MCNC: 13 MG/DL (ref 6–20)
BUN/CREAT SERPL: 22 (ref 12–20)
CA-I BLD-MCNC: 8.8 MG/DL (ref 8.5–10.1)
CALCULATED P AXIS, ECG09: 70 DEGREES
CALCULATED R AXIS, ECG10: 18 DEGREES
CALCULATED T AXIS, ECG11: 17 DEGREES
CAOX CRY URNS QL MICRO: NORMAL
CHLORIDE SERPL-SCNC: 107 MMOL/L (ref 97–108)
CO2 SERPL-SCNC: 27 MMOL/L (ref 21–32)
COLOR UR: NORMAL
CREAT SERPL-MCNC: 0.58 MG/DL (ref 0.55–1.02)
DIAGNOSIS, 93000: NORMAL
ERYTHROCYTE [DISTWIDTH] IN BLOOD BY AUTOMATED COUNT: 13.1 % (ref 11.5–14.5)
EST. AVERAGE GLUCOSE BLD GHB EST-MCNC: 103 MG/DL
GLUCOSE SERPL-MCNC: 94 MG/DL (ref 65–100)
GLUCOSE UR STRIP.AUTO-MCNC: NEGATIVE MG/DL
HBA1C MFR BLD: 5.2 % (ref 4–5.6)
HCT VFR BLD AUTO: 41.6 % (ref 35–47)
HGB BLD-MCNC: 13.4 G/DL (ref 11.5–16)
HGB UR QL STRIP: NEGATIVE
INR PPP: 0.9 (ref 0.9–1.1)
KETONES UR QL STRIP.AUTO: NEGATIVE MG/DL
LEUKOCYTE ESTERASE UR QL STRIP.AUTO: NEGATIVE
MCH RBC QN AUTO: 29.5 PG (ref 26–34)
MCHC RBC AUTO-ENTMCNC: 32.2 G/DL (ref 30–36.5)
MCV RBC AUTO: 91.6 FL (ref 80–99)
NITRITE UR QL STRIP.AUTO: NEGATIVE
NRBC # BLD: 0 K/UL (ref 0–0.01)
NRBC BLD-RTO: 0 PER 100 WBC
P-R INTERVAL, ECG05: 176 MS
PH UR STRIP: 5 [PH] (ref 5–8)
PLATELET # BLD AUTO: 285 K/UL (ref 150–400)
PMV BLD AUTO: 10.3 FL (ref 8.9–12.9)
POTASSIUM SERPL-SCNC: 4.4 MMOL/L (ref 3.5–5.1)
PROT UR STRIP-MCNC: NEGATIVE MG/DL
PROTHROMBIN TIME: 12.4 SEC (ref 11.9–14.7)
Q-T INTERVAL, ECG07: 430 MS
QRS DURATION, ECG06: 92 MS
QTC CALCULATION (BEZET), ECG08: 425 MS
RBC # BLD AUTO: 4.54 M/UL (ref 3.8–5.2)
RBC #/AREA URNS HPF: NORMAL /HPF (ref 0–5)
SODIUM SERPL-SCNC: 141 MMOL/L (ref 136–145)
SP GR UR REFRACTOMETRY: 1.02 (ref 1–1.03)
SPECIMEN EXP DATE BLD: NORMAL
THERAPEUTIC RANGE,PTTT: NORMAL SEC (ref 82–109)
UROBILINOGEN UR QL STRIP.AUTO: 0.1 EU/DL (ref 0.1–1)
VENTRICULAR RATE, ECG03: 59 BPM
WBC # BLD AUTO: 4.2 K/UL (ref 3.6–11)
WBC URNS QL MICRO: NORMAL /HPF (ref 0–4)

## 2021-06-14 PROCEDURE — 85610 PROTHROMBIN TIME: CPT

## 2021-06-14 PROCEDURE — 87086 URINE CULTURE/COLONY COUNT: CPT

## 2021-06-14 PROCEDURE — 81001 URINALYSIS AUTO W/SCOPE: CPT

## 2021-06-14 PROCEDURE — 80048 BASIC METABOLIC PNL TOTAL CA: CPT

## 2021-06-14 PROCEDURE — 83036 HEMOGLOBIN GLYCOSYLATED A1C: CPT

## 2021-06-14 PROCEDURE — 71046 X-RAY EXAM CHEST 2 VIEWS: CPT

## 2021-06-14 PROCEDURE — 87641 MR-STAPH DNA AMP PROBE: CPT

## 2021-06-14 PROCEDURE — 73560 X-RAY EXAM OF KNEE 1 OR 2: CPT

## 2021-06-14 PROCEDURE — 85730 THROMBOPLASTIN TIME PARTIAL: CPT

## 2021-06-14 PROCEDURE — 85027 COMPLETE CBC AUTOMATED: CPT

## 2021-06-14 PROCEDURE — 86901 BLOOD TYPING SEROLOGIC RH(D): CPT

## 2021-06-14 PROCEDURE — 36415 COLL VENOUS BLD VENIPUNCTURE: CPT

## 2021-06-14 PROCEDURE — 93005 ELECTROCARDIOGRAM TRACING: CPT

## 2021-06-14 RX ORDER — OMEGA-3 FATTY ACIDS/FISH OIL 300-500 MG
1 CAPSULE ORAL EVERY OTHER DAY
COMMUNITY

## 2021-06-14 RX ORDER — DICLOFENAC SODIUM 30 MG/G
GEL TOPICAL 2 TIMES DAILY
COMMUNITY
End: 2021-06-29

## 2021-06-14 RX ORDER — CHOLECALCIFEROL (VITAMIN D3) 125 MCG
1 CAPSULE ORAL DAILY
COMMUNITY

## 2021-06-14 NOTE — PERIOP NOTES
1 Dr. Kristina Salazar office called, with permission given by patient during PAT visit, requested medical clearance note to be faxed to PAT dept as soon as possible.

## 2021-06-15 LAB
BACTERIA SPEC CULT: NORMAL
MRSA DNA SPEC QL NAA+PROBE: NOT DETECTED
SPECIAL REQUESTS,SREQ: NORMAL

## 2021-06-15 NOTE — PERIOP NOTES
[id number] Lab results and xray results faxed to Dr. Bela Velázquez office, attention: Bebe Reyes MA with confirmation received.

## 2021-06-16 NOTE — PROGRESS NOTES
Patient attended joint class today. Patient was given the Joint Reference Guide for them to follow for important information. Patient had no questions during or after the class and was directed to the website and phone number provided if any further questions arise prior to surgery.

## 2021-06-27 ENCOUNTER — ANESTHESIA EVENT (OUTPATIENT)
Dept: SURGERY | Age: 56
End: 2021-06-27
Payer: OTHER GOVERNMENT

## 2021-06-27 NOTE — ANESTHESIA PREPROCEDURE EVALUATION
Relevant Problems   No relevant active problems       Anesthetic History   No history of anesthetic complications            Review of Systems / Medical History  Patient summary reviewed, nursing notes reviewed and pertinent labs reviewed    Pulmonary  Within defined limits                 Neuro/Psych   Within defined limits           Cardiovascular    Hypertension                Comments: EKG SINUS BRADYCARDIA. GI/Hepatic/Renal     GERD           Endo/Other        Obesity and arthritis    Comments: CARPAL TUNNEL SYNDROME LEFT HAND Other Findings   Comments: Right knee pain. Physical Exam    Airway  Mallampati: I  TM Distance: > 6 cm  Neck ROM: normal range of motion   Mouth opening: Normal     Cardiovascular    Rhythm: regular  Rate: normal         Dental      Comments: DENTURES.     Pulmonary      Decreased breath sounds           Abdominal         Other Findings            Anesthetic Plan    ASA: 2  Anesthesia type: spinal, regional and general - backup  ADDUCTOR CANAL BLOCK FOR POST OP PAIN        Induction: Intravenous  Anesthetic plan and risks discussed with: Patient and Family

## 2021-06-28 ENCOUNTER — HOSPITAL ENCOUNTER (OUTPATIENT)
Age: 56
Discharge: HOME HEALTH | End: 2021-06-29
Attending: ORTHOPAEDIC SURGERY | Admitting: ORTHOPAEDIC SURGERY
Payer: OTHER GOVERNMENT

## 2021-06-28 ENCOUNTER — ANESTHESIA (OUTPATIENT)
Dept: SURGERY | Age: 56
End: 2021-06-28
Payer: OTHER GOVERNMENT

## 2021-06-28 ENCOUNTER — APPOINTMENT (OUTPATIENT)
Dept: GENERAL RADIOLOGY | Age: 56
End: 2021-06-28
Attending: ORTHOPAEDIC SURGERY
Payer: OTHER GOVERNMENT

## 2021-06-28 DIAGNOSIS — M17.11 PRIMARY OSTEOARTHRITIS OF RIGHT KNEE: Primary | ICD-10-CM

## 2021-06-28 PROCEDURE — 77030005513 HC CATH URETH FOL11 MDII -B: Performed by: ORTHOPAEDIC SURGERY

## 2021-06-28 PROCEDURE — 77030031139 HC SUT VCRL2 J&J -A: Performed by: ORTHOPAEDIC SURGERY

## 2021-06-28 PROCEDURE — 77030013189 HC SLV COMPR SCD HUNT -B: Performed by: ORTHOPAEDIC SURGERY

## 2021-06-28 PROCEDURE — C1776 JOINT DEVICE (IMPLANTABLE): HCPCS | Performed by: ORTHOPAEDIC SURGERY

## 2021-06-28 PROCEDURE — 77030018673: Performed by: ORTHOPAEDIC SURGERY

## 2021-06-28 PROCEDURE — 74011250636 HC RX REV CODE- 250/636: Performed by: NURSE ANESTHETIST, CERTIFIED REGISTERED

## 2021-06-28 PROCEDURE — 76010000172 HC OR TIME 2.5 TO 3 HR INTENSV-TIER 1: Performed by: ORTHOPAEDIC SURGERY

## 2021-06-28 PROCEDURE — 77030003914: Performed by: ORTHOPAEDIC SURGERY

## 2021-06-28 PROCEDURE — 74011000250 HC RX REV CODE- 250: Performed by: ANESTHESIOLOGY

## 2021-06-28 PROCEDURE — 74011250636 HC RX REV CODE- 250/636: Performed by: ORTHOPAEDIC SURGERY

## 2021-06-28 PROCEDURE — 2709999900 HC NON-CHARGEABLE SUPPLY: Performed by: ORTHOPAEDIC SURGERY

## 2021-06-28 PROCEDURE — C1713 ANCHOR/SCREW BN/BN,TIS/BN: HCPCS | Performed by: ORTHOPAEDIC SURGERY

## 2021-06-28 PROCEDURE — 73560 X-RAY EXAM OF KNEE 1 OR 2: CPT

## 2021-06-28 PROCEDURE — 77030013708 HC HNDPC SUC IRR PULS STRY –B: Performed by: ORTHOPAEDIC SURGERY

## 2021-06-28 PROCEDURE — 77030006812 HC BLD SAW RECIP STRY -B: Performed by: ORTHOPAEDIC SURGERY

## 2021-06-28 PROCEDURE — 77030038692 HC WND DEB SYS IRMX -B: Performed by: ORTHOPAEDIC SURGERY

## 2021-06-28 PROCEDURE — 74011000258 HC RX REV CODE- 258: Performed by: ORTHOPAEDIC SURGERY

## 2021-06-28 PROCEDURE — 74011250636 HC RX REV CODE- 250/636: Performed by: ANESTHESIOLOGY

## 2021-06-28 PROCEDURE — 36415 COLL VENOUS BLD VENIPUNCTURE: CPT

## 2021-06-28 PROCEDURE — 77030040361 HC SLV COMPR DVT MDII -B: Performed by: ORTHOPAEDIC SURGERY

## 2021-06-28 PROCEDURE — 74011000250 HC RX REV CODE- 250: Performed by: ORTHOPAEDIC SURGERY

## 2021-06-28 PROCEDURE — 74011000250 HC RX REV CODE- 250: Performed by: NURSE ANESTHETIST, CERTIFIED REGISTERED

## 2021-06-28 PROCEDURE — 77030035643 HC BLD SAW OSC PRECIS STRY -C: Performed by: ORTHOPAEDIC SURGERY

## 2021-06-28 PROCEDURE — 76060000036 HC ANESTHESIA 2.5 TO 3 HR: Performed by: ORTHOPAEDIC SURGERY

## 2021-06-28 PROCEDURE — 74011250636 HC RX REV CODE- 250/636: Performed by: PHYSICIAN ASSISTANT

## 2021-06-28 PROCEDURE — 77030040179 HC DEV DRSG WND PICO S&N -C: Performed by: ORTHOPAEDIC SURGERY

## 2021-06-28 PROCEDURE — 77030007866 HC KT SPN ANES BBMI -B: Performed by: ANESTHESIOLOGY

## 2021-06-28 PROCEDURE — 77030000032 HC CUF TRNQT ZIMM -B: Performed by: ORTHOPAEDIC SURGERY

## 2021-06-28 PROCEDURE — 77030010785: Performed by: ORTHOPAEDIC SURGERY

## 2021-06-28 PROCEDURE — 74011250637 HC RX REV CODE- 250/637: Performed by: ORTHOPAEDIC SURGERY

## 2021-06-28 PROCEDURE — 87086 URINE CULTURE/COLONY COUNT: CPT

## 2021-06-28 PROCEDURE — 76210000006 HC OR PH I REC 0.5 TO 1 HR: Performed by: ORTHOPAEDIC SURGERY

## 2021-06-28 DEVICE — KNEE CEM FEM/TIB E1 PAT -- VANGUARD K6: Type: IMPLANTABLE DEVICE | Site: KNEE | Status: FUNCTIONAL

## 2021-06-28 DEVICE — TRAY TIB L67MM KNEE CO CHROM FIN MOD INTLOK VANGUARD: Type: IMPLANTABLE DEVICE | Site: KNEE | Status: FUNCTIONAL

## 2021-06-28 DEVICE — IMPLANTABLE DEVICE: Type: IMPLANTABLE DEVICE | Site: KNEE | Status: FUNCTIONAL

## 2021-06-28 DEVICE — COMPONENT PAT DIA31MM THK8MM STD KNEE TI ALLY S STL UHMWPE: Type: IMPLANTABLE DEVICE | Site: KNEE | Status: FUNCTIONAL

## 2021-06-28 DEVICE — CEMENT BNE 20ML 40GM FULL DOSE PMMA W/O ANTIBIO M VISC: Type: IMPLANTABLE DEVICE | Site: KNEE | Status: FUNCTIONAL

## 2021-06-28 RX ORDER — DEXAMETHASONE SODIUM PHOSPHATE 4 MG/ML
INJECTION, SOLUTION INTRA-ARTICULAR; INTRALESIONAL; INTRAMUSCULAR; INTRAVENOUS; SOFT TISSUE
Status: COMPLETED | OUTPATIENT
Start: 2021-06-28 | End: 2021-06-28

## 2021-06-28 RX ORDER — AMLODIPINE BESYLATE 5 MG/1
5 TABLET ORAL DAILY
Status: DISCONTINUED | OUTPATIENT
Start: 2021-06-28 | End: 2021-06-29 | Stop reason: HOSPADM

## 2021-06-28 RX ORDER — FENTANYL CITRATE 50 UG/ML
50 INJECTION, SOLUTION INTRAMUSCULAR; INTRAVENOUS
Status: DISCONTINUED | OUTPATIENT
Start: 2021-06-28 | End: 2021-06-28 | Stop reason: HOSPADM

## 2021-06-28 RX ORDER — MIDAZOLAM HYDROCHLORIDE 1 MG/ML
INJECTION, SOLUTION INTRAMUSCULAR; INTRAVENOUS
Status: COMPLETED
Start: 2021-06-28 | End: 2021-06-28

## 2021-06-28 RX ORDER — OXYCODONE HYDROCHLORIDE 10 MG/1
10 TABLET ORAL
Status: DISCONTINUED | OUTPATIENT
Start: 2021-06-28 | End: 2021-06-29 | Stop reason: HOSPADM

## 2021-06-28 RX ORDER — ROPIVACAINE HYDROCHLORIDE 5 MG/ML
INJECTION, SOLUTION EPIDURAL; INFILTRATION; PERINEURAL
Status: COMPLETED | OUTPATIENT
Start: 2021-06-28 | End: 2021-06-28

## 2021-06-28 RX ORDER — MELATONIN
2000 DAILY
Status: DISCONTINUED | OUTPATIENT
Start: 2021-06-28 | End: 2021-06-29 | Stop reason: HOSPADM

## 2021-06-28 RX ORDER — NALOXONE HYDROCHLORIDE 0.4 MG/ML
0.4 INJECTION, SOLUTION INTRAMUSCULAR; INTRAVENOUS; SUBCUTANEOUS AS NEEDED
Status: DISCONTINUED | OUTPATIENT
Start: 2021-06-28 | End: 2021-06-29 | Stop reason: HOSPADM

## 2021-06-28 RX ORDER — LIDOCAINE HYDROCHLORIDE 20 MG/ML
INJECTION, SOLUTION EPIDURAL; INFILTRATION; INTRACAUDAL; PERINEURAL AS NEEDED
Status: DISCONTINUED | OUTPATIENT
Start: 2021-06-28 | End: 2021-06-28 | Stop reason: HOSPADM

## 2021-06-28 RX ORDER — DIPHENHYDRAMINE HYDROCHLORIDE 50 MG/ML
12.5 INJECTION, SOLUTION INTRAMUSCULAR; INTRAVENOUS AS NEEDED
Status: DISCONTINUED | OUTPATIENT
Start: 2021-06-28 | End: 2021-06-28 | Stop reason: HOSPADM

## 2021-06-28 RX ORDER — DEXAMETHASONE SODIUM PHOSPHATE 4 MG/ML
INJECTION, SOLUTION INTRA-ARTICULAR; INTRALESIONAL; INTRAMUSCULAR; INTRAVENOUS; SOFT TISSUE AS NEEDED
Status: DISCONTINUED | OUTPATIENT
Start: 2021-06-28 | End: 2021-06-28 | Stop reason: HOSPADM

## 2021-06-28 RX ORDER — FAMOTIDINE 20 MG/1
20 TABLET, FILM COATED ORAL 2 TIMES DAILY
Status: DISCONTINUED | OUTPATIENT
Start: 2021-06-28 | End: 2021-06-29 | Stop reason: HOSPADM

## 2021-06-28 RX ORDER — AMOXICILLIN 250 MG
1 CAPSULE ORAL 2 TIMES DAILY
Status: DISCONTINUED | OUTPATIENT
Start: 2021-06-28 | End: 2021-06-29 | Stop reason: HOSPADM

## 2021-06-28 RX ORDER — CELECOXIB 200 MG/1
400 CAPSULE ORAL ONCE
Status: DISCONTINUED | OUTPATIENT
Start: 2021-06-28 | End: 2021-06-28

## 2021-06-28 RX ORDER — FACIAL-BODY WIPES
10 EACH TOPICAL DAILY PRN
Status: DISCONTINUED | OUTPATIENT
Start: 2021-06-30 | End: 2021-06-29 | Stop reason: HOSPADM

## 2021-06-28 RX ORDER — LIDOCAINE HYDROCHLORIDE 10 MG/ML
INJECTION, SOLUTION EPIDURAL; INFILTRATION; INTRACAUDAL; PERINEURAL
Status: DISPENSED
Start: 2021-06-28 | End: 2021-06-28

## 2021-06-28 RX ORDER — SODIUM CHLORIDE 0.9 % (FLUSH) 0.9 %
5-40 SYRINGE (ML) INJECTION EVERY 8 HOURS
Status: DISCONTINUED | OUTPATIENT
Start: 2021-06-28 | End: 2021-06-28 | Stop reason: HOSPADM

## 2021-06-28 RX ORDER — ROPIVACAINE HYDROCHLORIDE 5 MG/ML
INJECTION, SOLUTION EPIDURAL; INFILTRATION; PERINEURAL
Status: COMPLETED
Start: 2021-06-28 | End: 2021-06-28

## 2021-06-28 RX ORDER — SODIUM CHLORIDE 0.9 % (FLUSH) 0.9 %
5-40 SYRINGE (ML) INJECTION AS NEEDED
Status: DISCONTINUED | OUTPATIENT
Start: 2021-06-28 | End: 2021-06-28 | Stop reason: HOSPADM

## 2021-06-28 RX ORDER — SODIUM CHLORIDE, SODIUM LACTATE, POTASSIUM CHLORIDE, CALCIUM CHLORIDE 600; 310; 30; 20 MG/100ML; MG/100ML; MG/100ML; MG/100ML
INJECTION, SOLUTION INTRAVENOUS
Status: DISCONTINUED | OUTPATIENT
Start: 2021-06-28 | End: 2021-06-28 | Stop reason: HOSPADM

## 2021-06-28 RX ORDER — FENTANYL CITRATE 50 UG/ML
50 INJECTION, SOLUTION INTRAMUSCULAR; INTRAVENOUS AS NEEDED
Status: DISCONTINUED | OUTPATIENT
Start: 2021-06-28 | End: 2021-06-28 | Stop reason: HOSPADM

## 2021-06-28 RX ORDER — SODIUM CHLORIDE 9 MG/ML
125 INJECTION, SOLUTION INTRAVENOUS CONTINUOUS
Status: DISPENSED | OUTPATIENT
Start: 2021-06-28 | End: 2021-06-29

## 2021-06-28 RX ORDER — GLYCOPYRROLATE 0.2 MG/ML
INJECTION INTRAMUSCULAR; INTRAVENOUS AS NEEDED
Status: DISCONTINUED | OUTPATIENT
Start: 2021-06-28 | End: 2021-06-28 | Stop reason: HOSPADM

## 2021-06-28 RX ORDER — SODIUM CHLORIDE, SODIUM LACTATE, POTASSIUM CHLORIDE, CALCIUM CHLORIDE 600; 310; 30; 20 MG/100ML; MG/100ML; MG/100ML; MG/100ML
20 INJECTION, SOLUTION INTRAVENOUS CONTINUOUS
Status: DISCONTINUED | OUTPATIENT
Start: 2021-06-28 | End: 2021-06-29 | Stop reason: HOSPADM

## 2021-06-28 RX ORDER — DEXAMETHASONE SODIUM PHOSPHATE 4 MG/ML
INJECTION, SOLUTION INTRA-ARTICULAR; INTRALESIONAL; INTRAMUSCULAR; INTRAVENOUS; SOFT TISSUE
Status: COMPLETED
Start: 2021-06-28 | End: 2021-06-28

## 2021-06-28 RX ORDER — ONDANSETRON 2 MG/ML
INJECTION INTRAMUSCULAR; INTRAVENOUS AS NEEDED
Status: DISCONTINUED | OUTPATIENT
Start: 2021-06-28 | End: 2021-06-28 | Stop reason: HOSPADM

## 2021-06-28 RX ORDER — METOCLOPRAMIDE HYDROCHLORIDE 5 MG/ML
5 INJECTION INTRAMUSCULAR; INTRAVENOUS EVERY 6 HOURS
Status: DISCONTINUED | OUTPATIENT
Start: 2021-06-28 | End: 2021-06-29 | Stop reason: HOSPADM

## 2021-06-28 RX ORDER — FENTANYL CITRATE 50 UG/ML
INJECTION, SOLUTION INTRAMUSCULAR; INTRAVENOUS AS NEEDED
Status: DISCONTINUED | OUTPATIENT
Start: 2021-06-28 | End: 2021-06-28 | Stop reason: HOSPADM

## 2021-06-28 RX ORDER — ACETAMINOPHEN 325 MG/1
650 TABLET ORAL
Status: DISCONTINUED | OUTPATIENT
Start: 2021-06-28 | End: 2021-06-29 | Stop reason: HOSPADM

## 2021-06-28 RX ORDER — DIPHENHYDRAMINE HCL 12.5MG/5ML
12.5 ELIXIR ORAL
Status: DISPENSED | OUTPATIENT
Start: 2021-06-28 | End: 2021-06-29

## 2021-06-28 RX ORDER — MORPHINE SULFATE 1 MG/ML
INJECTION, SOLUTION EPIDURAL; INTRATHECAL; INTRAVENOUS
Status: COMPLETED | OUTPATIENT
Start: 2021-06-28 | End: 2021-06-28

## 2021-06-28 RX ORDER — ASPIRIN 325 MG
325 TABLET, DELAYED RELEASE (ENTERIC COATED) ORAL 2 TIMES DAILY
Status: DISCONTINUED | OUTPATIENT
Start: 2021-06-28 | End: 2021-06-29 | Stop reason: HOSPADM

## 2021-06-28 RX ORDER — BUPIVACAINE HYDROCHLORIDE 7.5 MG/ML
INJECTION, SOLUTION INTRASPINAL
Status: COMPLETED | OUTPATIENT
Start: 2021-06-28 | End: 2021-06-28

## 2021-06-28 RX ORDER — OXYCODONE HCL 10 MG/1
10 TABLET, FILM COATED, EXTENDED RELEASE ORAL ONCE
Status: COMPLETED | OUTPATIENT
Start: 2021-06-28 | End: 2021-06-28

## 2021-06-28 RX ORDER — PROPOFOL 10 MG/ML
INJECTION, EMULSION INTRAVENOUS
Status: DISCONTINUED | OUTPATIENT
Start: 2021-06-28 | End: 2021-06-28 | Stop reason: HOSPADM

## 2021-06-28 RX ORDER — OXYCODONE HYDROCHLORIDE 5 MG/1
5 TABLET ORAL
Status: DISCONTINUED | OUTPATIENT
Start: 2021-06-28 | End: 2021-06-29 | Stop reason: HOSPADM

## 2021-06-28 RX ORDER — MIDAZOLAM HYDROCHLORIDE 1 MG/ML
INJECTION, SOLUTION INTRAMUSCULAR; INTRAVENOUS AS NEEDED
Status: DISCONTINUED | OUTPATIENT
Start: 2021-06-28 | End: 2021-06-28 | Stop reason: HOSPADM

## 2021-06-28 RX ORDER — ONDANSETRON 2 MG/ML
4 INJECTION INTRAMUSCULAR; INTRAVENOUS AS NEEDED
Status: DISCONTINUED | OUTPATIENT
Start: 2021-06-28 | End: 2021-06-28 | Stop reason: HOSPADM

## 2021-06-28 RX ORDER — HYDROMORPHONE HYDROCHLORIDE 1 MG/ML
0.4 INJECTION, SOLUTION INTRAMUSCULAR; INTRAVENOUS; SUBCUTANEOUS
Status: DISCONTINUED | OUTPATIENT
Start: 2021-06-28 | End: 2021-06-28 | Stop reason: HOSPADM

## 2021-06-28 RX ORDER — GABAPENTIN 300 MG/1
300 CAPSULE ORAL ONCE
Status: COMPLETED | OUTPATIENT
Start: 2021-06-28 | End: 2021-06-28

## 2021-06-28 RX ORDER — MORPHINE SULFATE 2 MG/ML
2 INJECTION, SOLUTION INTRAMUSCULAR; INTRAVENOUS ONCE
Status: ACTIVE | OUTPATIENT
Start: 2021-06-28 | End: 2021-06-28

## 2021-06-28 RX ORDER — BISMUTH SUBSALICYLATE 262 MG
TABLET,CHEWABLE ORAL DAILY
Status: DISCONTINUED | OUTPATIENT
Start: 2021-06-29 | End: 2021-06-29 | Stop reason: HOSPADM

## 2021-06-28 RX ORDER — ONDANSETRON 2 MG/ML
4 INJECTION INTRAMUSCULAR; INTRAVENOUS
Status: DISPENSED | OUTPATIENT
Start: 2021-06-28 | End: 2021-06-29

## 2021-06-28 RX ORDER — ACETAMINOPHEN 325 MG/1
650 TABLET ORAL ONCE
Status: COMPLETED | OUTPATIENT
Start: 2021-06-28 | End: 2021-06-28

## 2021-06-28 RX ORDER — HYDROMORPHONE HYDROCHLORIDE 1 MG/ML
0.5 INJECTION, SOLUTION INTRAMUSCULAR; INTRAVENOUS; SUBCUTANEOUS
Status: ACTIVE | OUTPATIENT
Start: 2021-06-28 | End: 2021-06-29

## 2021-06-28 RX ORDER — DEXAMETHASONE SODIUM PHOSPHATE 100 MG/10ML
10 INJECTION INTRAMUSCULAR; INTRAVENOUS ONCE
Status: DISCONTINUED | OUTPATIENT
Start: 2021-06-29 | End: 2021-06-28

## 2021-06-28 RX ORDER — MIDAZOLAM HYDROCHLORIDE 1 MG/ML
1 INJECTION, SOLUTION INTRAMUSCULAR; INTRAVENOUS AS NEEDED
Status: DISCONTINUED | OUTPATIENT
Start: 2021-06-28 | End: 2021-06-28 | Stop reason: HOSPADM

## 2021-06-28 RX ORDER — MORPHINE SULFATE 0.5 MG/ML
INJECTION, SOLUTION EPIDURAL; INTRATHECAL; INTRAVENOUS
Status: DISPENSED
Start: 2021-06-28 | End: 2021-06-28

## 2021-06-28 RX ORDER — DEXAMETHASONE SODIUM PHOSPHATE 100 MG/10ML
10 INJECTION INTRAMUSCULAR; INTRAVENOUS ONCE
Status: COMPLETED | OUTPATIENT
Start: 2021-06-28 | End: 2021-06-28

## 2021-06-28 RX ORDER — SODIUM CHLORIDE 0.9 % (FLUSH) 0.9 %
5-40 SYRINGE (ML) INJECTION AS NEEDED
Status: DISCONTINUED | OUTPATIENT
Start: 2021-06-28 | End: 2021-06-29 | Stop reason: HOSPADM

## 2021-06-28 RX ORDER — POLYETHYLENE GLYCOL 3350 17 G/17G
17 POWDER, FOR SOLUTION ORAL DAILY
Status: DISCONTINUED | OUTPATIENT
Start: 2021-06-28 | End: 2021-06-29 | Stop reason: HOSPADM

## 2021-06-28 RX ORDER — HYDROCODONE BITARTRATE AND ACETAMINOPHEN 5; 325 MG/1; MG/1
1 TABLET ORAL AS NEEDED
Status: DISCONTINUED | OUTPATIENT
Start: 2021-06-28 | End: 2021-06-28 | Stop reason: HOSPADM

## 2021-06-28 RX ORDER — MORPHINE SULFATE 4 MG/ML
10 INJECTION INTRAVENOUS ONCE
Status: ACTIVE | OUTPATIENT
Start: 2021-06-28 | End: 2021-06-28

## 2021-06-28 RX ORDER — SODIUM CHLORIDE 0.9 % (FLUSH) 0.9 %
5-40 SYRINGE (ML) INJECTION EVERY 8 HOURS
Status: DISCONTINUED | OUTPATIENT
Start: 2021-06-28 | End: 2021-06-29 | Stop reason: HOSPADM

## 2021-06-28 RX ADMIN — FENTANYL CITRATE 25 MCG: 50 INJECTION, SOLUTION INTRAMUSCULAR; INTRAVENOUS at 07:40

## 2021-06-28 RX ADMIN — BUPIVACAINE HYDROCHLORIDE 13 MG: 7.5 INJECTION, SOLUTION INTRASPINAL at 07:16

## 2021-06-28 RX ADMIN — ONDANSETRON 4 MG: 2 INJECTION INTRAMUSCULAR; INTRAVENOUS at 08:05

## 2021-06-28 RX ADMIN — ACETAMINOPHEN 650 MG: 325 TABLET ORAL at 19:04

## 2021-06-28 RX ADMIN — PHENYLEPHRINE HYDROCHLORIDE 100 MCG: 10 INJECTION INTRAVENOUS at 08:02

## 2021-06-28 RX ADMIN — Medication 10 ML: at 22:07

## 2021-06-28 RX ADMIN — OXYCODONE HYDROCHLORIDE 5 MG: 5 TABLET ORAL at 23:08

## 2021-06-28 RX ADMIN — LIDOCAINE HYDROCHLORIDE 40 MG: 20 INJECTION, SOLUTION EPIDURAL; INFILTRATION; INTRACAUDAL; PERINEURAL at 07:42

## 2021-06-28 RX ADMIN — ASPIRIN 325 MG: 325 TABLET, COATED ORAL at 12:23

## 2021-06-28 RX ADMIN — PROPOFOL 75 MCG/KG/MIN: 10 INJECTION, EMULSION INTRAVENOUS at 07:42

## 2021-06-28 RX ADMIN — MORPHINE SULFATE 0.15 MG: 1 INJECTION, SOLUTION EPIDURAL; INTRATHECAL; INTRAVENOUS at 07:16

## 2021-06-28 RX ADMIN — FENTANYL CITRATE 25 MCG: 50 INJECTION, SOLUTION INTRAMUSCULAR; INTRAVENOUS at 08:45

## 2021-06-28 RX ADMIN — Medication 10 ML: at 15:17

## 2021-06-28 RX ADMIN — ASPIRIN 325 MG: 325 TABLET, COATED ORAL at 22:03

## 2021-06-28 RX ADMIN — DOCUSATE SODIUM 50 MG AND SENNOSIDES 8.6 MG 1 TABLET: 8.6; 5 TABLET, FILM COATED ORAL at 22:03

## 2021-06-28 RX ADMIN — ACETAMINOPHEN 650 MG: 325 TABLET ORAL at 06:20

## 2021-06-28 RX ADMIN — ONDANSETRON 4 MG: 2 INJECTION INTRAMUSCULAR; INTRAVENOUS at 23:01

## 2021-06-28 RX ADMIN — SODIUM CHLORIDE, POTASSIUM CHLORIDE, SODIUM LACTATE AND CALCIUM CHLORIDE: 600; 310; 30; 20 INJECTION, SOLUTION INTRAVENOUS at 07:15

## 2021-06-28 RX ADMIN — DEXAMETHASONE SODIUM PHOSPHATE 4 MG: 4 INJECTION, SOLUTION INTRA-ARTICULAR; INTRALESIONAL; INTRAMUSCULAR; INTRAVENOUS; SOFT TISSUE at 08:05

## 2021-06-28 RX ADMIN — ROPIVACAINE HYDROCHLORIDE 19 ML: 5 INJECTION, SOLUTION EPIDURAL; INFILTRATION; PERINEURAL at 07:03

## 2021-06-28 RX ADMIN — GABAPENTIN 300 MG: 300 CAPSULE ORAL at 06:20

## 2021-06-28 RX ADMIN — OXYCODONE HYDROCHLORIDE 10 MG: 10 TABLET, FILM COATED, EXTENDED RELEASE ORAL at 06:20

## 2021-06-28 RX ADMIN — FAMOTIDINE 20 MG: 20 TABLET, FILM COATED ORAL at 21:09

## 2021-06-28 RX ADMIN — TRANEXAMIC ACID 1 G: 1 INJECTION, SOLUTION INTRAVENOUS at 07:26

## 2021-06-28 RX ADMIN — DEXAMETHASONE SODIUM PHOSPHATE 4 MG: 4 INJECTION, SOLUTION INTRA-ARTICULAR; INTRALESIONAL; INTRAMUSCULAR; INTRAVENOUS; SOFT TISSUE at 07:03

## 2021-06-28 RX ADMIN — MIDAZOLAM HYDROCHLORIDE 1 MG: 2 INJECTION, SOLUTION INTRAMUSCULAR; INTRAVENOUS at 07:07

## 2021-06-28 RX ADMIN — MIDAZOLAM HYDROCHLORIDE 1 MG: 2 INJECTION, SOLUTION INTRAMUSCULAR; INTRAVENOUS at 07:35

## 2021-06-28 RX ADMIN — DEXAMETHASONE SODIUM PHOSPHATE 10 MG: 10 INJECTION, SOLUTION INTRAMUSCULAR; INTRAVENOUS at 15:54

## 2021-06-28 RX ADMIN — FAMOTIDINE 20 MG: 20 TABLET, FILM COATED ORAL at 12:23

## 2021-06-28 RX ADMIN — CEFAZOLIN SODIUM 2 G: 1 INJECTION, POWDER, FOR SOLUTION INTRAMUSCULAR; INTRAVENOUS at 07:31

## 2021-06-28 RX ADMIN — CEFAZOLIN 2 G: 1 INJECTION, POWDER, FOR SOLUTION INTRAMUSCULAR; INTRAVENOUS at 23:01

## 2021-06-28 RX ADMIN — ONDANSETRON 4 MG: 2 INJECTION INTRAMUSCULAR; INTRAVENOUS at 12:16

## 2021-06-28 RX ADMIN — ACETAMINOPHEN 650 MG: 325 TABLET ORAL at 12:16

## 2021-06-28 RX ADMIN — METOCLOPRAMIDE HYDROCHLORIDE 5 MG: 5 INJECTION INTRAMUSCULAR; INTRAVENOUS at 15:28

## 2021-06-28 RX ADMIN — PHENYLEPHRINE HYDROCHLORIDE 200 MCG: 10 INJECTION INTRAVENOUS at 07:54

## 2021-06-28 RX ADMIN — DOCUSATE SODIUM 50 MG AND SENNOSIDES 8.6 MG 1 TABLET: 8.6; 5 TABLET, FILM COATED ORAL at 12:23

## 2021-06-28 RX ADMIN — METOCLOPRAMIDE HYDROCHLORIDE 5 MG: 5 INJECTION INTRAMUSCULAR; INTRAVENOUS at 20:56

## 2021-06-28 RX ADMIN — SODIUM CHLORIDE 125 ML/HR: 9 INJECTION, SOLUTION INTRAVENOUS at 11:28

## 2021-06-28 RX ADMIN — ONDANSETRON 4 MG: 2 INJECTION INTRAMUSCULAR; INTRAVENOUS at 17:44

## 2021-06-28 RX ADMIN — TRANEXAMIC ACID 1 G: 1 INJECTION, SOLUTION INTRAVENOUS at 09:50

## 2021-06-28 RX ADMIN — FENTANYL CITRATE 50 MCG: 50 INJECTION, SOLUTION INTRAMUSCULAR; INTRAVENOUS at 10:10

## 2021-06-28 RX ADMIN — PHENYLEPHRINE HYDROCHLORIDE 200 MCG: 10 INJECTION INTRAVENOUS at 07:36

## 2021-06-28 RX ADMIN — CEFAZOLIN 2 G: 1 INJECTION, POWDER, FOR SOLUTION INTRAMUSCULAR; INTRAVENOUS at 15:16

## 2021-06-28 RX ADMIN — GLYCOPYRROLATE 0.2 MG: 0.2 INJECTION, SOLUTION INTRAMUSCULAR; INTRAVENOUS at 07:35

## 2021-06-28 NOTE — PROGRESS NOTES
PT eval order received. PT eval attempted at 13:30. Pt c/o n/v, pt looked diaphoretic. PT wiped pt's face, encouraged pt to drink more ginger ale, placed emesis bag close to her, and placed cold washcloth on her neck. Plan to re-attempt PT eval again later today.

## 2021-06-28 NOTE — ANESTHESIA POSTPROCEDURE EVALUATION
Procedure(s):  RIGHT TOTAL KNEE ARTHROPLASTY.     spinal, regional, MAC    Anesthesia Post Evaluation        Patient location during evaluation: PACU  Patient participation: complete - patient participated  Level of consciousness: awake and awake and alert  Pain score: 0  Pain management: adequate  Airway patency: patent  Anesthetic complications: no  Cardiovascular status: acceptable and hemodynamically stable  Respiratory status: acceptable  Hydration status: acceptable  Post anesthesia nausea and vomiting:  controlled  Final Post Anesthesia Temperature Assessment:  Normothermia (36.0-37.5 degrees C)      INITIAL Post-op Vital signs:   Vitals Value Taken Time   /61 06/28/21 1031   Temp 36.5 °C (97.7 °F) 06/28/21 1030   Pulse 82 06/28/21 1031   Resp 16 06/28/21 1031   SpO2 100 % 06/28/21 1031

## 2021-06-28 NOTE — ANESTHESIA PROCEDURE NOTES
Peripheral Block    Start time: 6/28/2021 7:03 AM  End time: 6/28/2021 7:06 AM  Performed by: Frank Rosenbaum MD  Authorized by: Frank Rosenbaum MD       Pre-procedure: Indications: at surgeon's request and post-op pain management    Preanesthetic Checklist: patient identified, risks and benefits discussed, site marked, timeout performed, anesthesia consent given and patient being monitored    Timeout Time: 06:59 EDT (VERSED 1MG 1/V AT 6:59AM)          Block Type:   Block Type: Adductor canal block  Laterality:  Right  Monitoring:  Standard ASA monitoring, continuous pulse ox, frequent vital sign checks, heart rate, oxygen and responsive to questions  Injection Technique:  Single shot  Procedures: ultrasound guided    Patient Position: supine  Prep: chlorhexidine    Location:  Upper thigh  Needle Type:  Pajunk  Needle Gauge:  20 G  Needle Localization:  Ultrasound guidance  Medication Injected:  Ropivacaine (PF) (NAROPIN)(0.5%) 5 mg/mL injection, 19 mL  dexamethasone (DECADRON) 4 mg/mL injection, 4 mg  Med Admin Time: 6/28/2021 7:03 AM    Assessment:  Number of attempts:  1  Injection Assessment:  Incremental injection every 5 mL, local visualized surrounding nerve on ultrasound, negative aspiration for blood, no intravascular symptoms, no paresthesia and ultrasound image on chart  Patient tolerance:  Patient tolerated the procedure well with no immediate complications  Strict asepsis with hat, mask, sterile gloves, sterile ultrasound jelly and choloprep. Structures identified with sonosite, lidocaine 1% local/skin wheel 2ml administered. Block needle advanced under sonosite guidance and block meds administered under sonosite visualization. Tolerated well, no complications.

## 2021-06-28 NOTE — PROGRESS NOTES
Patient still experiencing nausea and vomiting, declining to get out of bed at this time. Leno MILNER made aware and Reglan and Decadron ordered at this time. Will attempt to ambulate patient at a later time depending on patient condition.

## 2021-06-28 NOTE — OP NOTES
Operative Note    Patient: Abdias Fung  YOB: 1965  MRN: [id number]    Date of Procedure: 6/28/2021     Pre-Op Diagnosis: RIGHT KNEE OSTEOARTHRITIS    Post-Op Diagnosis: Same as preoperative diagnosis. Procedure(s):  RIGHT TOTAL KNEE ARTHROPLASTY    Surgeon(s):  Robert Austin MD    Surgical Assistant: Registered Nurse First Assistant: Sarita Willis RN  Surg Asst-1: Alva Soulier    Anesthesia: Spinal     Estimated Blood Loss (mL):  less than 165     Complications: None    Specimens:   ID Type Source Tests Collected by Time Destination   1 : urine Urine Cox Specimen CULTURE, URINE, URINALYSIS W/MICROSCOPIC Robert Austin MD 6/28/2021 9228 Microbiology        Implants:   Implant Name Type Inv.  Item Serial No.  Lot No. LRB No. Used Action   CEMENT BNE RP FL DOSE 40GM -- SIMPLEX P SNGL/EA ONLY - SNA  CEMENT BNE RP FL DOSE 40GM -- SIMPLEX P SNGL/EA ONLY NA [employer] [facility] [id number] Right 1 Implanted   CEMENT BNE RP FL DOSE 40GM -- SIMPLEX P SNGL/EA ONLY - SNA  CEMENT BNE RP FL DOSE 40GM -- SIMPLEX P SNGL/EA ONLY NA [employer] [facility] [id number] Right 1 Implanted   COMPONENT PAT [id number] THK8MM STD KNEE TI ALLY S STL UHMWPE - SNA  COMPONENT PAT [id number] THK8MM STD KNEE TI ALLY S STL UHMWPE NA [facility] [id number] Right 1 Implanted   COMPONENT FEM 65MM R KNEE TINBN NP POST STBL OPN BX INTLOK - SNA  COMPONENT FEM 65MM R KNEE TINBN NP POST STBL OPN BX INTLOK NA [employer]_ [id number] Right 1 Implanted   TRAY TIB L67MM KNEE CO CHROM FIN MOD INTLOK VANGUARD - SNA  TRAY TIB L67MM KNEE CO CHROM FIN MOD INTLOK VANGUARD NA [employer]_ [id number] Right 1 Implanted   BEARING TIB PS 63/67X10 MM KNEE VIVACIT-E VANGUARD - SNA  BEARING TIB PS 63/67X10 MM KNEE VIVACIT-E VANGUARD NA [employer]_ [id number] Right 1 Implanted       Drains: * No LDAs found *    Findings: advanced osteoarthritis most pronounced right knee     Indication for procedure : 43-year-old active female with advanced osteoarthritic changes along the right knee and failed conservative management schedule for right total knee arthroplasty today. Risks and benefits of the procedure was thoroughly reviewed with the patient with inherent risk of stiffness, DVT, pulmonary embolism, infection, periprosthetic fracture. Patient did have some stiffness preoperatively and hence was sent for rehab and has a preoperative flexion of 95 degrees maximum. I discussed with her that the best predictor of postoperative range of motion is preoperatively and range of motion. Patient acknowledges understanding. From surgical consent was obtained subsequentially scheduled for surgery today. Detailed Description of Procedure: The patient and operative site were identified in the preoperative holding area. After induction of spinal anesthesia the patient was positioned supine on the OR table. A Cox catheter was confirmed, intravenous antibiotics were administered in the form of cefazolin per protocol. A high thigh tourniquet was set at 300 mmHg. Standard prepping and draping of the right lower extremity were performed with chlorhexidine and ChloraPrep. Timeout was called. The tourniquet was inflated. I used an anterior midline incision with a medial parapatellar arthrotomy with standard exposure of the knee. A subperiosteal medial sleeve was elevated, the deep MCL released, and superficial MCL carefully maintained. The fat pad was resected along with the menisci, and the notch was cleared of osteophytes and the cruciate ligaments. The patella was not everted, but rather subluxed laterally throughout the case. Intramedullary alignment was then used for making the distal femoral measured cuts at 10mm, with 5° of valgus. The proximal tibia was exposed with excellent axis, and a 90° conservative cut was made with extramedullary alignment.  The cut was checked and noted to be satisfactory. Extension gap was balanced, noted to be a rectangular, and measured 10 mm with sizer block     The femur was now sized to a size 65. I used a measured resection technique to attach the #3 distal femoral cutting block in 3° of external rotation. The flexion gap was now carefully checked, and was noted to be identical at 10  mm. The posterior condyle or cut was now completed, and the flexion gap again double checked to be a rectangular and 10 mm. The distal femur was now completed, along with the box cut to accept the PS #65 right femoral component. The trial was inserted with excellent fit. A free-floating tibial trial was inserted with a trial 67.5 baseplate and a 10 mm PS insert. The knee showed excellent soft tissue balance and stability, with full extension and range of motion 0-125°. Patellar tracking was excellent. The tibial baseplate was pinned into position. The patella was cut to restore the native thickness by taking down 8mm with the cut and replacing with 8mm peg. It was prepared to accept a 31 mm 3 pegged patellar component. The proximal tibia was prepared to accept the 67.5  tibial baseplate. With all the trials in position, the knee again showed excellent stability, range of motion and patellar tracking. All the trials were removed. The joint was thoroughly pulse irrigated. The femoral drill hole was plugged with bone, and all the implants where inserted with third-generation cement technique. All extra cement was removed, and the knee was placed in full extension. The joint was now irrigated with irrisept  for 90 seconds. Thorough pulsatile irrigation was performed. Standard closure was performed by me and the first assistant Matthew powell. Local anesthetic was infiltrated into the skin and soft tissues. Sterile dressing was applied . The tourniquet was released at 120minutes.      The patient tolerated the procedure well and was transferred to the recovery room in stable condition. Postoperatively, the patient will be started on a standard total knee rehabilitation protocol with weightbearing as tolerated.       Electronically Signed by Rhona Rosa MD on 6/28/2021 at 10:09 AM

## 2021-06-28 NOTE — PROGRESS NOTES
Patient arrived to the unit accompanied by PACU RN, vital signs stable, no complaints verbalized, family at bedside. Will continue monitoring per unit protocol.

## 2021-06-28 NOTE — PROGRESS NOTES
Bedside shift change report given to Mony Robledo (oncoming nurse) by Debra Bernabe (offgoing nurse).

## 2021-06-28 NOTE — PROGRESS NOTES
PT eval attempted a second time today at 1504. Pt continued to have nausea and looked diaphoretic. Plan to continue to follow pt and attempt PT eval at a later time.

## 2021-06-29 VITALS
WEIGHT: 154 LBS | HEART RATE: 61 BPM | RESPIRATION RATE: 18 BRPM | OXYGEN SATURATION: 100 % | HEIGHT: 60 IN | TEMPERATURE: 98 F | BODY MASS INDEX: 30.23 KG/M2 | DIASTOLIC BLOOD PRESSURE: 49 MMHG | SYSTOLIC BLOOD PRESSURE: 98 MMHG

## 2021-06-29 LAB
ANION GAP SERPL CALC-SCNC: 5 MMOL/L (ref 5–15)
BACTERIA SPEC CULT: NORMAL
BUN SERPL-MCNC: 10 MG/DL (ref 6–20)
BUN/CREAT SERPL: 19 (ref 12–20)
CA-I BLD-MCNC: 8.8 MG/DL (ref 8.5–10.1)
CHLORIDE SERPL-SCNC: 108 MMOL/L (ref 97–108)
CO2 SERPL-SCNC: 27 MMOL/L (ref 21–32)
CREAT SERPL-MCNC: 0.52 MG/DL (ref 0.55–1.02)
GLUCOSE SERPL-MCNC: 109 MG/DL (ref 65–100)
HGB BLD-MCNC: 12 G/DL (ref 11.5–16)
POTASSIUM SERPL-SCNC: 4.8 MMOL/L (ref 3.5–5.1)
SODIUM SERPL-SCNC: 140 MMOL/L (ref 136–145)
SPECIAL REQUESTS,SREQ: NORMAL

## 2021-06-29 PROCEDURE — 85018 HEMOGLOBIN: CPT

## 2021-06-29 PROCEDURE — 97110 THERAPEUTIC EXERCISES: CPT | Performed by: PHYSICAL THERAPIST

## 2021-06-29 PROCEDURE — 97116 GAIT TRAINING THERAPY: CPT | Performed by: PHYSICAL THERAPIST

## 2021-06-29 PROCEDURE — 74011250636 HC RX REV CODE- 250/636: Performed by: ORTHOPAEDIC SURGERY

## 2021-06-29 PROCEDURE — 97161 PT EVAL LOW COMPLEX 20 MIN: CPT | Performed by: PHYSICAL THERAPIST

## 2021-06-29 PROCEDURE — 97165 OT EVAL LOW COMPLEX 30 MIN: CPT

## 2021-06-29 PROCEDURE — 74011250637 HC RX REV CODE- 250/637: Performed by: ORTHOPAEDIC SURGERY

## 2021-06-29 PROCEDURE — 36415 COLL VENOUS BLD VENIPUNCTURE: CPT

## 2021-06-29 PROCEDURE — 80048 BASIC METABOLIC PNL TOTAL CA: CPT

## 2021-06-29 PROCEDURE — 97530 THERAPEUTIC ACTIVITIES: CPT

## 2021-06-29 PROCEDURE — 74011250636 HC RX REV CODE- 250/636: Performed by: PHYSICIAN ASSISTANT

## 2021-06-29 RX ORDER — ASPIRIN 325 MG
325 TABLET, DELAYED RELEASE (ENTERIC COATED) ORAL 2 TIMES DAILY
Qty: 42 TABLET | Refills: 0 | Status: SHIPPED | OUTPATIENT
Start: 2021-06-29 | End: 2021-07-20

## 2021-06-29 RX ORDER — ONDANSETRON 4 MG/1
4 TABLET, ORALLY DISINTEGRATING ORAL
Qty: 20 TABLET | Refills: 0 | Status: SHIPPED | OUTPATIENT
Start: 2021-06-29

## 2021-06-29 RX ORDER — ACETAMINOPHEN 325 MG/1
1000 TABLET ORAL
Qty: 30 TABLET | Refills: 0 | Status: SHIPPED
Start: 2021-06-29

## 2021-06-29 RX ORDER — ONDANSETRON 2 MG/ML
4 INJECTION INTRAMUSCULAR; INTRAVENOUS
Status: DISCONTINUED | OUTPATIENT
Start: 2021-06-29 | End: 2021-06-29 | Stop reason: HOSPADM

## 2021-06-29 RX ORDER — OXYCODONE HYDROCHLORIDE 5 MG/1
5 TABLET ORAL
Qty: 20 TABLET | Refills: 0 | Status: SHIPPED | OUTPATIENT
Start: 2021-06-29 | End: 2021-07-04

## 2021-06-29 RX ADMIN — MULTIVITAMIN TABLET 1 TABLET: TABLET at 08:08

## 2021-06-29 RX ADMIN — ACETAMINOPHEN 650 MG: 325 TABLET ORAL at 11:29

## 2021-06-29 RX ADMIN — OXYCODONE HYDROCHLORIDE 10 MG: 10 TABLET ORAL at 11:29

## 2021-06-29 RX ADMIN — OXYCODONE HYDROCHLORIDE 5 MG: 5 TABLET ORAL at 08:08

## 2021-06-29 RX ADMIN — ONDANSETRON 4 MG: 2 INJECTION INTRAMUSCULAR; INTRAVENOUS at 11:38

## 2021-06-29 RX ADMIN — Medication 10 ML: at 06:44

## 2021-06-29 RX ADMIN — DOCUSATE SODIUM 50 MG AND SENNOSIDES 8.6 MG 1 TABLET: 8.6; 5 TABLET, FILM COATED ORAL at 08:08

## 2021-06-29 RX ADMIN — AMLODIPINE BESYLATE 5 MG: 5 TABLET ORAL at 09:32

## 2021-06-29 RX ADMIN — ASPIRIN 325 MG: 325 TABLET, COATED ORAL at 08:08

## 2021-06-29 RX ADMIN — ONDANSETRON 4 MG: 2 INJECTION INTRAMUSCULAR; INTRAVENOUS at 05:56

## 2021-06-29 RX ADMIN — METOCLOPRAMIDE HYDROCHLORIDE 5 MG: 5 INJECTION INTRAMUSCULAR; INTRAVENOUS at 03:29

## 2021-06-29 RX ADMIN — METOCLOPRAMIDE HYDROCHLORIDE 5 MG: 5 INJECTION INTRAMUSCULAR; INTRAVENOUS at 08:39

## 2021-06-29 RX ADMIN — ACETAMINOPHEN 650 MG: 325 TABLET ORAL at 06:35

## 2021-06-29 RX ADMIN — OXYCODONE HYDROCHLORIDE 5 MG: 5 TABLET ORAL at 03:29

## 2021-06-29 RX ADMIN — FAMOTIDINE 20 MG: 20 TABLET, FILM COATED ORAL at 08:08

## 2021-06-29 RX ADMIN — Medication 2000 UNITS: at 08:08

## 2021-06-29 NOTE — ROUTINE PROCESS
Bedside shift change report given to Laurita Carrington Rn (oncoming nurse) by George Brand Rn(offgoing nurse). Report included the following information SBAR.

## 2021-06-29 NOTE — PROGRESS NOTES
Orthopedic progress note    Date:2021       Room:[facility]  Patient Adele Cavazos     YOB: 1965     Age:55 y.o. Subjective    52-year-old female status post right total knee arthroplasty. Postop day #1. Patient doing well. She complains of minimal pain of her right knee. She does feel her pain medication helps. The patient was nauseous and vomiting most of the evening yesterday. That has resolved. She did not ambulate with therapy yesterday evening secondary to her nausea and vomiting. No other complaints at this time.   Objective           Vitals Last 24 Hours:  TEMPERATURE:  Temp  Av.6 °F (36.4 °C)  Min: 96.2 °F (35.7 °C)  Max: 98.4 °F (36.9 °C)  RESPIRATIONS RANGE: Resp  Av.1  Min: 16  Max: 18  PULSE OXIMETRY RANGE: SpO2  Av.9 %  Min: 94 %  Max: 100 %  PULSE RANGE: Pulse  Av.9  Min: 52  Max: 94  BLOOD PRESSURE RANGE: Systolic (26EBS), BIJ:557 , Min:93 , HXA:586   ; Diastolic (85UWC), IYE:24, Min:53, Max:74    Current Facility-Administered Medications   Medication Dose Route Frequency    lactated Ringers infusion  20 mL/hr IntraVENous CONTINUOUS    amLODIPine (NORVASC) tablet 5 mg  5 mg Oral DAILY    cholecalciferol (VITAMIN D3) (1000 Units /25 mcg) tablet 2,000 Units  2,000 Units Oral DAILY    multivitamin (ONE A DAY) tablet   Oral DAILY    0.9% sodium chloride infusion  125 mL/hr IntraVENous CONTINUOUS    sodium chloride 0.9 % bolus infusion 500 mL  500 mL IntraVENous ONCE PRN    sodium chloride (NS) flush 5-40 mL  5-40 mL IntraVENous Q8H    sodium chloride (NS) flush 5-40 mL  5-40 mL IntraVENous PRN    naloxone (NARCAN) injection 0.4 mg  0.4 mg IntraVENous PRN    senna-docusate (PERICOLACE) 8.6-50 mg per tablet 1 Tablet  1 Tablet Oral BID    polyethylene glycol (MIRALAX) packet 17 g  17 g Oral DAILY    [START ON 2021] bisacodyL (DULCOLAX) suppository 10 mg  10 mg Rectal DAILY PRN    acetaminophen (TYLENOL) tablet 650 mg  650 mg Oral Q6H PRN oxyCODONE IR (ROXICODONE) tablet 5 mg  5 mg Oral Q4H PRN    oxyCODONE IR (ROXICODONE) tablet 10 mg  10 mg Oral Q4H PRN    HYDROmorphone (DILAUDID) injection 0.5 mg  0.5 mg IntraVENous Q4H PRN    ondansetron (ZOFRAN) injection 4 mg  4 mg IntraVENous Q4H PRN    diphenhydrAMINE (BENADRYL) 12.5 mg/5 mL oral elixir 12.5 mg  12.5 mg Oral Q6H PRN    famotidine (PEPCID) tablet 20 mg  20 mg Oral BID    aspirin delayed-release tablet 325 mg  325 mg Oral BID    metoclopramide HCl (REGLAN) injection 5 mg  5 mg IntraVENous Q6H      Review of Systems   Constitutional: Negative for malaise/fatigue. Respiratory: Negative for cough, shortness of breath and wheezing. Cardiovascular: Negative for chest pain and palpitations. Gastrointestinal: Negative for abdominal pain, heartburn, nausea and vomiting. Neurological: Negative for headaches. Musculoskeletal: Denies any numbness/tingling of operative extremity    I/O (24Hr): Intake/Output Summary (Last 24 hours) at 6/29/2021 0836  Last data filed at 6/29/2021 0558  Gross per 24 hour   Intake 1340 ml   Output 1675 ml   Net -335 ml     Objective  Labs/Imaging/Diagnostics    Labs:  CBC:No results for input(s): WBC, RBC, HGB, HCT, MCV, RDW, PLT, HGBEXT, HCTEXT, PLTEXT in the last 72 hours. CHEMISTRIES:  Recent Labs     06/29/21  0730      K 4.8      CO2 27   BUN 10   CREA 0.52*   CA 8.8   PT/INR:No results for input(s): INR, INREXT in the last 72 hours. No lab exists for component: PROTIME  APTT:No results for input(s): APTT in the last 72 hours. LIVER PROFILE:No results for input(s): AST, ALT in the last 72 hours. No lab exists for component: BILIDIR, BILITOT, ALKPHOS  No results found for: ALT, AST, GGT, GGTP, AP, APIT, APX, CBIL, TBIL, TBILI    Physical Exam:  Right lower extremity: Dressing clean dry and intact. Sensation intact throughout right lower extremity. No calf pain to palpation. DP pulses palpable. Cap refill is 2 seconds.   EHL/DF/PF is 5 out of 5. Negative Homans. Right lower extremity neurovascularly intact. Assessment//Plan           Patient Active Problem List    Diagnosis Date Noted    Osteoarthritis of right knee 06/28/2021     Status post right total knee arthroplasty. Postop day #1. Will start therapy this morning. Continue with aspirin for DVT prophylaxis. Spirometer and exercises encouraged. Plan to discharge home today. I independently evaluated the patient and agree with above plan. Patient states that she is likely allergic secondary to the narcotics and hence a prescription for Zofran will be given to the patient along with her pain medications. She is doing well with physical therapy at this time.     Electronically signed by Adam Oropeza PA-C on 6/29/2021 at 8:36 AM

## 2021-06-29 NOTE — PROGRESS NOTES
CM met with patient at bedside. Patient agrees with home health. Choice obtained for [facility]. CM sent referral and called [address] [place] [address] at [phone] and spoke with Delaney Murillo. Patient was currently on services with them and they have accepted patient. CM obtained choice for [facility] for DME. Rolling Walker order sent electronically to [facility] through [employer] and also paper copy given to patient at bedside. Patient prefers to  walker at [facility]. Patient is cleared by case management to discharge. [address] [place] [address]: [facility] [phone], [facility] date 6/30/2021. Discharge plan of care/case management plan validated with provider discharge order.

## 2021-06-29 NOTE — PROGRESS NOTES
Problem: Mobility Impaired (Adult and Pediatric)  Goal: *Acute Goals and Plan of Care (Insert Text)  Description: Pt will be I with LE HEP in 7 days. Pt will perform transfers with supervision in 7 days. Pt will amb 300 feet with FWW safely with mod I in 7 days. Pt will ascend/descend 2 steps without handrails at CGA  and rolling walker in 7 days to safely enter home. Outcome: Not Met     Problem: Patient Education: Go to Patient Education Activity  Goal: Patient/Family Education  Outcome: Not Met   PHYSICAL THERAPY EVALUATION  Patient: Debora Ramírez (84 y.o. female)  Date: 6/29/2021  Primary Diagnosis: Osteoarthritis of right knee, unspecified osteoarthritis type [M17.11]  Procedure(s) (LRB):  RIGHT TOTAL KNEE ARTHROPLASTY (Right) 1 Day Post-Op   Precautions:      ASSESSMENT  48 yof who had scheduled R TKA on 6/28/21, WBAT. Pt had OP PT prior to R TKA to improved her knee ROM and to decrease the potential of post-operative complications. Pt lives with her  in [address] [place] [address] with 2 ROHAN without railing. Pt's STEPHANIE has come down from out of state to stay with pt to assist during the day. Pt MOD I with SPC at PLOF. A&O x4. IND with bed mobility, SBA with transfers (recliner/bed) and SBA with ambulation with amb with  feet. VCs for increased R knee flexion with swing phase of gait and extension wtih heel contact of gait. HEP: quad set while in recliner to improve R knee extension and prevent R knee contractures. Pt demos decreased R knee ROM, gait abnormalities, pain, R knee swelling, decreased balance, and impared transfers. Pt would benefit from acute PT to address her limitations. PT rec DC home with HHPT and rolling walker. Patient will benefit from skilled therapy intervention to address the above noted impairments.        PLAN :  Recommendations and Planned Interventions: transfer training, gait training, therapeutic exercises, neuromuscular re-education, and therapeutic activities Frequency/Duration: Patient will be followed by physical therapy:  twice daily to address goals. Recommendation for discharge: (in order for the patient to meet his/her long term goals)   PT    This discharge recommendation:  Has not yet been discussed the attending provider and/or case management    IF patient discharges home will need the following DME: rolling walker         SUBJECTIVE:   Patient stated I don't have pain right now. My STEPHANIE came into help when I go home.     OBJECTIVE DATA SUMMARY:   HISTORY:    Past Medical History:   Diagnosis Date    Arthritis     Hypertension     Knee pain, right     X 4-5 years     Past Surgical History:   Procedure Laterality Date    HX CARPAL TUNNEL RELEASE Right     aug 2020    HX ORTHOPAEDIC      Right knee, arthosocpy and cartilage removal    HX TOTAL ABDOMINAL HYSTERECTOMY      HX TUBAL LIGATION         Personal factors and/or comorbidities impacting plan of care: see pt chart    Home Situation  Home Environment: Private residence  # Steps to Enter: 2  Rails to Enter: No  One/Two Story Residence: One story  Living Alone: No  Support Systems: Spouse/Significant Other/Partner (STEPHANIE)  Patient Expects to be Discharged to[de-identified] House  Current DME Used/Available at Home: Cane, straight    PLOF: Pt MOD I for ADLS/IADLS, MOD I with mobility prior to admission.      EXAMINATION/PRESENTATION/DECISION MAKING:   Critical Behavior:  Neurologic State: Alert  Orientation Level: Oriented X4  Cognition: Appropriate decision making, Appropriate for age attention/concentration, Appropriate safety awareness, Follows commands       Range Of Motion:   WFL, R knee decresaed                       Strength:       LLE WFL  RLE decreased but functional                  Functional Mobility:  Bed Mobility:   IND           Transfers:      SBA                       Balance:      Ambulation/Gait Trainin feet with rolling walker at SBA              Therapeutic Exercises:   [employer] sets      [facility] Mobility Inpatient Short Form  How much difficulty does the patient currently have. .. Unable A Lot A Little None   1. Turning over in bed (including adjusting bedclothes, sheets and blankets)? [] 1   [] 2   [] 3   [x] 4   2. Sitting down on and standing up from a chair with arms ( e.g., wheelchair, bedside commode, etc.)   [] 1   [] 2   [x] 3   [] 4   3. Moving from lying on back to sitting on the side of the bed? [] 1   [] 2   [] 3   [x] 4          How much help from another person does the patient currently need. .. Total A Lot A Little None   4. Moving to and from a bed to a chair (including a wheelchair)? [] 1   [] 2   [x] 3   [] 4   5. Need to walk in hospital room? [] 1   [] 2   [x] 3   [] 4   6. Climbing 3-5 steps with a railing? [] 1   [] 2   [x] 3   [] 4   © , Trustees of [facility], under license to [employer]. All rights reserved     Score:  Initial:  Most Recent: X (Date: -- )   Interpretation of Tool:  Represents activities that are increasingly more difficult (i.e. Bed mobility, Transfers, Gait). Score 24 23 22-20 19-15 14-10 9-7 6   Modifier CH CI CJ CK CL CM CN          Physical Therapy Evaluation Charge Determination   History Examination Presentation Decision-Making   LOW Complexity : Zero comorbidities / personal factors that will impact the outcome / POC LOW Complexity : 1-2 Standardized tests and measures addressing body structure, function, activity limitation and / or participation in recreation  LOW Complexity : Stable, uncomplicated  Other Functional Measure [facility] 6       Based on the above components, the patient evaluation is determined to be of the following complexity level: LOW     Pain Ratin/10    Activity Tolerance:   Good  Please refer to the flowsheet for vital signs taken during this treatment.     After treatment patient left in no apparent distress:   Sitting in chair, Call bell within reach, and Caregiver / family present    COMMUNICATION/EDUCATION:   The patients plan of care was discussed with: Occupational therapist and Registered nurse. Patient/family agree to work toward stated goals and plan of care.     Thank you for this referral.  Ghanshyam Sandoval, PT, DPT   Time Calculation: 26 mins

## 2021-06-29 NOTE — PROGRESS NOTES
Problem: Mobility Impaired (Adult and Pediatric)  Goal: *Acute Goals and Plan of Care (Insert Text)  Description: Pt will be I with LE HEP in 7 days. Pt will perform transfers with supervision in 7 days. Pt will amb 300 feet with FWW safely with mod I in 7 days. Pt will ascend/descend 2 steps without handrails at CGA and rolling walker in 7 days to safely enter home. 6/29/2021 1425 by Landon Arndt PT, DPT  Outcome: Resolved/Met     Problem: Patient Education: Go to Patient Education Activity  Goal: Patient/Family Education  6/29/2021 1425 by Landon Arndt, PT, DPT  Outcome: Resolved/Met     PHYSICAL THERAPY TREATMENT  Patient: Clay Pritchard (41 y.o. female)  Date: 6/29/2021  Diagnosis: Osteoarthritis of right knee, unspecified osteoarthritis type [M17.11] <principal problem not specified>  Procedure(s) (LRB):  RIGHT TOTAL KNEE ARTHROPLASTY (Right) 1 Day Post-Op  Precautions:    Chart, physical therapy assessment, plan of care and goals were reviewed. ASSESSMENT  Patient continues with skilled PT services and is progressing towards goals. Pt found supine in bed, agreeable to PT treatment. Pt amb [facility] feet with FWW at [facility]. She negotiated 4 steps with FWW at [facility]. Pt's STEPHANIE was instructed on how to change legs of FWW so that FWW can be used on steps as pt does not have railings on steps to enter her home. PT received HEP with PT, to be completed twice a day. PT answered all questions that pt had while reviewing HEP. R knee flexion ROM: 5-64 active degrees, and 80 degrees passive. Current Level of Function Impacting Discharge (mobility/balance): SBA ambulation, transfers, CGA with stair negociatation         PLAN :  Patient continues to benefit from skilled intervention to address the above impairments. Continue treatment per established plan of care. to address goals.     Recommendation for discharge: (in order for the patient to meet his/her long term goals)  Physical therapy at least 2 days/week in the home     This discharge recommendation:  Has not yet been discussed the attending provider and/or case management    IF patient discharges home will need the following DME: rolling walker       SUBJECTIVE:   Patient stated I did a lot of these exercises before.     OBJECTIVE DATA SUMMARY:   Critical Behavior:  Neurologic State: Alert  Orientation Level: Oriented X4  Cognition: Appropriate decision making, Appropriate for age attention/concentration, Appropriate safety awareness, Follows commands     Functional Mobility Training:  Bed Mobility:  Rolling: Independent  Supine to Sit: Independent  Sit to Supine: Independent  Scooting: Independent        Transfers:  Sit to Stand: Supervision  Stand to Sit: Supervision        Bed to Chair: Supervision                    Balance:  Sitting: Intact  Standing: Intact; With support  Standing - Static: Constant support;Good  Standing - Dynamic : Constant support;Good  Ambulation/Gait Training:  Distance (ft): 300 feet  Assistive Device: Gait belt;Walker, rolling  Ambulation - Level of Assistance: Stand-by assistance     Gait Description (WDL): Exceptions to [facility] [place]           Base of Support: Widened  Stance: Left increased        Swing Pattern: Right symmetrical                 Stairs:   Ascend and descend [facility]            Therapeutic Exercises:   Therapeutic Exercises:   SEATED  EXERCISES   Sets   Reps   Active Active Assist   Passive Self ROM   Comments   Ankle Pumps 1 30 [x]                                        []                                        []                                        []                                           Quad Sets 1 30 [x]                                        []                                        []                                        []                                           Heel Slides 1 30 [x]                                        []                                        [] []                                           Hip Abduction 1 30 [x]                                        []                                        []                                        []                                           Short Arc Quads 1 30 [x]                                        []                                        []                                        []                                           Straight Leg Raise 1 30 [x]                                        []                                        []                                        []                                              Pain Ratin/10    Activity Tolerance:   Good  Please refer to the flowsheet for vital signs taken during this treatment. After treatment patient left in no apparent distress:   Walking in the bathroom with nursing    COMMUNICATION/COLLABORATION:   The patients plan of care was discussed with: Registered nurse.      Matthias Hawley, PT, DPT   Time Calculation: 65 mins

## 2021-06-29 NOTE — PROGRESS NOTES
OCCUPATIONAL THERAPY EVALUATION/DISCHARGE  Patient: Khalida Maloney (59 y.o. female)  Date: 6/29/2021  Primary Diagnosis: Osteoarthritis of right knee, unspecified osteoarthritis type [M17.11]  Procedure(s) (LRB):  RIGHT TOTAL KNEE ARTHROPLASTY (Right) 1 Day Post-Op   Precautions: WBAT       ASSESSMENT  Patient is 49 y/o female came to [facility] and placed under observation on 6/28/2021 for right knee OA and is s/p right total knee arthroplasty (6/28/2021 by Dr. Kyle Manriquez and is WBAT). Pt has hx of arthritis, HTN, right knee pain, carpal tunnel release right, right knee athroplasty with cartilage removal. Pt received semi supine in bed A&Ox4 and agreeable for OT eval/tx. Per pt report, pt lives with spouse (sister in law to also stay with pt for a few days to assist) in one story home with 2 steps no rails to enter and is MI for self care (has shower chair if needed) and functional transfers/mobility recently using cane. Pt educated on laura LE dressing techniques with surgery leg in first out last when donning/doffing pants/undergarments with pt demonstrating and verbalizing understanding. Pt currently presents close to baseline for self care (supervision LB dressing, toileting/cloth mgmt and grooming standing sink) and functional transfers/mobility (supervision sit<->stand and toilet transfer with gait belt and RW). Pt with no acute OT needs at this time thus will D/C pt from skilled OT services at this time. Recommend discharge to home with family care when medically appropriate. Current Level of Function (ADLs/self-care): supervision    Other factors to consider for discharge: close to baseline functional status     PLAN :  Recommend with staff: allow toilet transfers    Recommendation for discharge: (in order for the patient to meet his/her long term goals)  No skilled occupational therapy/ follow up rehabilitation needs identified at this time.     This discharge recommendation:  Has been made in collaboration with the attending provider and/or case management    IF patient discharges home will need the following DME: none       SUBJECTIVE:   Patient stated I feel good.     OBJECTIVE DATA SUMMARY:   HISTORY:   Past Medical History:   Diagnosis Date    Arthritis     Hypertension     Knee pain, right     X 4-5 years     Past Surgical History:   Procedure Laterality Date    HX CARPAL TUNNEL RELEASE Right     aug 2020    HX ORTHOPAEDIC      Right knee, arthosocpy and cartilage removal    HX TOTAL ABDOMINAL HYSTERECTOMY      HX TUBAL LIGATION         Prior Level of Function/Environment/Context: MI for self care and functional transfers/mobility  Expanded or extensive additional review of patient history:   Home Situation  Home Environment: Private residence  # Steps to Enter: 2  Rails to Enter: No  One/Two Story Residence: One story  Living Alone: No  Support Systems: Spouse/Significant Other/Partner (STEPHANIE)  Patient Expects to be Discharged to[de-identified] [employer]  Current DME Used/Available at Home: Cane, makenna    EXAMINATION OF PERFORMANCE DEFICITS:  Cognitive/Behavioral Status:  Neurologic State: Alert  Orientation Level: Oriented X4  Cognition: Appropriate decision making; Appropriate for age attention/concentration; Appropriate safety awareness; Follows commands        Range of Motion:  AROM: Within functional limits (laura UE)     Strength:  Strength: Within functional limits (laura UE)     Balance:  Sitting: Intact  Standing: Intact; With support  Standing - Static: Constant support;Good  Standing - Dynamic : Constant support;Good    Functional Mobility and Transfers for ADLs:  Bed Mobility:  Rolling: Independent  Supine to Sit: Independent  Sit to Supine: Independent  Scooting: Independent    Transfers:  Sit to Stand: Supervision  Stand to Sit: Supervision  Bed to Chair: Supervision  Bathroom Mobility: Supervision/set up  Toilet Transfer : Supervision  Assistive Device : Gait Belt;Walker, rolling    ADL Assessment:     Oral Facial Hygiene/Grooming: Supervision    Lower Body Dressing: Supervision    Toileting: Supervision     ADL Intervention and task modifications:     Grooming  Grooming Assistance: Supervision  Position Performed: Standing  Washing Hands: Supervision    Lower Body Dressing Assistance  Socks: Supervision    Toileting  Toileting Assistance: Supervision  Bladder Hygiene: Supervision  Bowel Hygiene: Supervision  Clothing Management: [address] [facility] AM-PACTM \"6 Clicks\"                                                       Daily Activity Inpatient Short Form  How much help from another person does the patient currently need. .. Total; A Lot A Little None   1. Putting on and taking off regular lower body clothing? []  1 []  2 []  3 [x]  4   2. Bathing (including washing, rinsing, drying)? []  1 []  2 []  3 [x]  4   3. Toileting, which includes using toilet, bedpan or urinal? [] 1 []  2 []  3 [x]  4   4. Putting on and taking off regular upper body clothing? []  1 []  2 []  3 [x]  4   5. Taking care of personal grooming such as brushing teeth? []  1 []  2 []  3 [x]  4   6. Eating meals? []  1 []  2 []  3 [x]  4   © 2007, [address] [address], under license to [employer]. All rights reserved     Score: 24/24     Interpretation of Tool:  Represents clinically-significant functional categories (i.e. Activities of daily living).   Percentage of Impairment CH    0%   CI    1-19% CJ    20-39% CK    40-59% CL    60-79% CM    80-99% CN     100%   [facility]  Score 6-24 24 23 20-22 15-19 10-14 7-9 6         Occupational Therapy Evaluation Charge Determination   History Examination Decision-Making   LOW Complexity : Brief history review  LOW Complexity : 1-3 performance deficits relating to physical, cognitive , or psychosocial skils that result in activity limitations and / or participation restrictions  LOW Complexity : No comorbidities that affect functional and no verbal or physical assistance needed to complete eval tasks       Based on the above components, the patient evaluation is determined to be of the following complexity level: LOW   Pain Rating:  No pain reported    Activity Tolerance:   Good  Please refer to the flowsheet for vital signs taken during this treatment. After treatment patient left in no apparent distress:    Sitting in chair, Call bell within reach, and Caregiver / family present    COMMUNICATION/EDUCATION:   The patients plan of care was discussed with: Physical therapist and Registered nurse.      Thank you for this referral.  Toma Jackson  Time Calculation: 19 mins

## 2021-06-29 NOTE — PROGRESS NOTES
Discharge instructions and education gone over with patient and family at bedside. No questions or concerns at this time. IV removed, catheter intact, no resistance. Patient belongings gathered.   Patient discharged to home in stable condition, transported home by family

## 2021-08-10 ENCOUNTER — HOSPITAL ENCOUNTER (OUTPATIENT)
Dept: PHYSICAL THERAPY | Age: 56
Discharge: HOME | End: 2021-08-10
Payer: OTHER GOVERNMENT

## 2021-08-10 PROCEDURE — 97110 THERAPEUTIC EXERCISES: CPT

## 2021-08-10 PROCEDURE — 97161 PT EVAL LOW COMPLEX 20 MIN: CPT

## 2021-08-10 NOTE — PROGRESS NOTES
PT INITIAL EVALUATION NOTE 2-15    Patient Name: Jessica Black  Date:8/10/2021  : 1965  [x]  Patient  Verified  Payor: SHOSHANA / Plan: Jhonny Chaudhary 74 / Product Type:  /    In time:   Out time: 293  Total Treatment Time (min): 51  Visit #: 1     Treatment Area: Encounter for other specified surgical aftercare [Z48.89]    SUBJECTIVE  Pain Level (0-10 scale): 0/10  Any medication changes, allergies to medications, adverse drug reactions, diagnosis change, or new procedure performed?: [] No    [x] Yes (see summary sheet for update)  Subjective:     Pt reports post-TKA on 2021 and has been receiving home health PT 3x/week since surgery. Pt was D/C from [facility] in [place] on 2021 at which time pt called her MD and he sent a referral for outpatient therapy. Pt had her last follow up with surgeon on 2021 and has her next follow up on 2021. Pt states that she experiences the most pain when she is bending her knee and rates that pain at a 5/10. Pt reports that the pain has been so bad that she feels nauseous from the discomfort. Pt is using a L quad cane for ambulation when in the community but at home she states she does not use an AD. Pt reports that she has not been going out in her community often since surgery as her  has been doing all of the errands. Pt states that she has not been able to complete household responsibilities that require prolonged standing. Pt states that often when walking she gets a feeling like her R knee is locking. Pt denies any recent falls.      PLOF: Independent with all ADL's; no use of AD  Mechanism of Injury: R TKA on 2021  Previous Treatment/Compliance: attended PT prior to TKA and had been receiving home health PT since surgery  Radiographs: none recently  What increases symptoms: bending the knee  What decreases symptoms: ice and pain medication  Work Hx: none  Living Situation: lives with  in one story home; 2 steps without rails and uses door/cane for assistance  Pt Goals: to be able to bend the knee better and walk more   Barriers: potential arthrofibrosis  Motivation: Good  Substance use: None reported  Cognition: A&O x 4  Past Medical History:  Past Medical History:   Diagnosis Date    Arthritis     Hypertension     Knee pain, right     X 4-5 years     Past Surgical History:  Past Surgical History:   Procedure Laterality Date    HX CARPAL TUNNEL RELEASE Right     aug 2020    HX ORTHOPAEDIC      Right knee, arthosocpy and cartilage removal    HX TOTAL ABDOMINAL HYSTERECTOMY      HX TUBAL LIGATION       Current Medications:  Current Outpatient Medications   Medication Instructions    acetaminophen (TYLENOL) 975 mg, Oral, EVERY 8 HOURS AS NEEDED    amLODIPine (NORVASC) 5 mg, Oral, DAILY    cholecalciferol, vitamin D3, (Vitamin D3) 50 mcg (2,000 unit) tab 1 Tablet, Oral, DAILY    fish oil-omega-3 fatty acids (Fish Oil) 300-500 mg cap 1 Caplet, Oral, EVERY OTHER DAY    MITCH EXTRACT PO 1 Tablet, Oral, DAILY    multivit/folic acid/vit K1 (ONE-A-DAY WOMEN'S 50 PLUS PO) 1 Tablet, Oral, DAILY    ondansetron (ZOFRAN ODT) 4 mg, Oral, EVERY 6 HOURS AS NEEDED          OBJECTIVE/EXAMINATION  Gait and Functional Mobility:  Ambulating with L quad cane; pt demonstrated decreased L knee flexion/extension in gait and an antalgic gait pattern with pt spending increased stance time on L. Palpation: Pt reports TTP along medial aspect of knee and medial/lateral insertion    Hip:  Strength AROM PROM     Right Left        Flexion 5/5 5/5       Knee:  Strength AROM PROM     Right Left Right Left Right Left    Flexion 3/5 5/5 72  74     Extension 3/5 5/5 13  8    Ankle  Strength AROM PROM     Right Left Right Left Right Left    Dorsiflexion 5/5 5/5        Plantarflexion 5/5 5/5       *All strength measures are on a scale with 5 as a maximum, if a space is left blank it was not tested.   All ROM measurements are measured in degrees. Flexibility: Increased R hamstring tightness and muscle guarding  Girth measurements:    Mid-patella:     R- 44cm      L- 39cm    16 min Therapeutic Exercise:  [x] See flow sheet :   Rationale: increase ROM to improve the patients ability to negotiate stairs without an exacerbation of symptoms.     With   [x] TE   [] TA   [] neuro   [] other: Patient Education: [x] Provided HEP to include supine heel slides and quad sets  [] Progressed/Changed HEP based on:   [] positioning   [] body mechanics   [] transfers   [] heat/ice application    [] other:        Pain Level (0-10 scale) post treatment: 7/10      ASSESSMENT:    [x]  See Plan of [address] Tatizaiah Rios PT, DPT 8/10/2021

## 2021-08-10 NOTE — PROGRESS NOTES
[address]  [place], [place]  Ph: [phone]    Fax: [phone]    Plan of Care/Statement of Necessity for Physical Therapy Services  2-15    Patient name: Rei Kerr  : 1965  Provider#: [id number]  Referral source: Aristeo Fenton MD      Medical/Treatment Diagnosis: Encounter for other specified surgical aftercare [Z48.89]     Prior Hospitalization: see medical history     Comorbidities: see medical history  Prior Level of Function: Independent with all ADL's; no use of AD  Medications: Verified on Patient Summary List    Start of Care: 8/10/2021      Onset Date: 2021       The Plan of Care and following information is based on the information from the initial evaluation. Assessment/ key information: Pt is a 55 y.o. female that presents to skilled PT s/p R TKA on 2021 after completing home health PT. Pt will benefit from skilled PT to address the following impairments: increased pain, decrease R knee ROM, decreased R knee strength, increased difficulty with ambulation and stair negotiation, and increased difficulty with performance of household ADLs. Pt is a good candidate for skilled PT due to pt motivation to return to PLOF, however progress may be slowed secondary to likely presence of increased post-op scar tissue. HEP was provided and pt verbalized understanding of the importance of performing exercises at home to allow for continued progression.     Evaluation Complexity History LOW Complexity : Zero comorbidities / personal factors that will impact the outcome / POC; Examination LOW Complexity : 1-2 Standardized tests and measures addressing body structure, function, activity limitation and / or participation in recreation  ;Presentation LOW Complexity : Stable, uncomplicated   Overall Complexity Rating: LOW     Problem List: pain affecting function, decrease ROM, decrease strength, edema affecting function, impaired gait/ balance, decrease ADL/ functional abilitiies and decrease flexibility/ joint mobility   Treatment Plan may include any combination of the following: Therapeutic exercise, Therapeutic activities, Neuromuscular re-education, Physical agent/modality, Gait/balance training, Manual therapy, Patient education, Functional mobility training and Stair training  Patient / Family readiness to learn indicated by: asking questions  Persons(s) to be included in education: patient (P)  Barriers to Learning/Limitations: None  Patient Goal (s): to be able to walk and bend my knee more  Patient Self Reported Health Status: good  Rehabilitation Potential: Good    Short Term Goals: To be accomplished in 15 treatments. 1. Pt will increase knee flexion AROM by 10 degrees in order to improve pt ability to step into her bathtub. 2. Pt will increase knee extension AROM by 5 degrees in order to improve pt ability to ambulate with a normalized gait pattern   3. Pt will ambulate x500 feet with least restrictive device and no increase in pain in order to allow pt to navigate the aisle of the grocery store. Long Term Goals: To be accomplished in 30  treatments. 1. Pt will increase knee flexion AROM to 105 degrees in order to improve pt ability negotiate stairs with a reciprocal gait pattern. 2. Pt will increase knee extension ROM to 0 degrees in order to improve pt ability to ambulate with no AD. 3. Pt will increase R knee strength to 5/5 in order to improve pt ability to squat to pick an object off of the floor. 4.Pt will negotiate x5 steps with least restrictive device and reciprocal gait pattern in order to improve pt ability to enter/exit her home without an exacerbation of symptoms. Frequency / Duration: Patient to be seen 3 times per week for 30 treatments.     Patient/ Caregiver education and instruction: exercises    [x]  Plan of care has been reviewed with PAM Montejo, PT DPT  8/10/2021 ________________________________________________________________________    I certify that the above Therapy Services are being furnished while the patient is under my care. I agree with the treatment plan and certify that this therapy is necessary.     Physician's Signature:____________________  Date:____________Time: _________    Patient name: Brandie Marmolejo  : 1965  Provider#: [id number]

## 2021-08-13 ENCOUNTER — HOSPITAL ENCOUNTER (OUTPATIENT)
Dept: PHYSICAL THERAPY | Age: 56
Discharge: HOME | End: 2021-08-13
Payer: OTHER GOVERNMENT

## 2021-08-13 PROCEDURE — 97014 ELECTRIC STIMULATION THERAPY: CPT

## 2021-08-13 PROCEDURE — 97110 THERAPEUTIC EXERCISES: CPT

## 2021-08-13 PROCEDURE — 97016 VASOPNEUMATIC DEVICE THERAPY: CPT

## 2021-08-13 NOTE — PROGRESS NOTES
PT DAILY TREATMENT NOTE 2-15    Patient Name: Jessica Black  Date:2021  : 1965  [x]  Patient  Verified  Payor: SHOSHANA / Plan: Jhonny Chaudhary 74 / Product Type: Nadine John /    In EPHC:2689  Out time:1442  Total Treatment Time (min): 72  Visit #:  2    Treatment Area: Encounter for other specified surgical aftercare [Z48.89]    SUBJECTIVE  Pain Level (0-10 scale): 0/10  Any medication changes, allergies to medications, adverse drug reactions, diagnosis change, or new procedure performed?: [x] No    [] Yes (see summary sheet for update)  Subjective functional status/changes:   [] No changes reported  \"I have a bicycle at home I use. \"    OBJECTIVE      Modality rationale: decrease inflammation, decrease pain, increase tissue extensibility and increase muscle contraction/control to improve the patients ability to ambulate without limp   Min Type Additional Details       [] Estim: []Att   []Unatt    []TENS instruct                  []IFC  []Premod   []NMES                    []Other:  []w/US      []w/ heat  []w/ ice  Position:  Location:       []  Traction: [] Cervical       []Lumbar                       [] Prone          []Supine                       []Intermittent   []Continuous Lbs:  [] before manual  [] after manual  [] w/ heat  [] Simultaneously performed with w/ Estim    []  Ultrasound: []Continuous   [] Pulsed                       at: []1MHz   []3MHz Location:  W/cm2:    [] Paraffin         Location:   []w/heat    []  Ice     []  Heat  []  Ice massage Position:  Location:    []  Laser  []  Other: Position:  Location:     10 [x]  Vasopneumatic Device Pressure:       [] lo [x] med [] hi   [x] w/ ice      Temperature:  34  [] Simultaneously performed with w/ Estim     [x] Skin assessment post-treatment:  [x]intact []redness- no adverse reaction    []redness  adverse reaction:       52 min Therapeutic Exercise:  [] See flow sheet :   Rationale: increase ROM, increase strength, improve coordination, improve balance and increase proprioception to improve the patients ability to ambulate without limp            With   [x] TE   [] TA   [] neuro   [] other: Patient Education: [x] Review HEP    [] Progressed/Changed HEP based on:   [] positioning   [] body mechanics   [] transfers   [] heat/ice application    [] other:      Other Objective/Functional Measures: focus on knee flexion to improve proper gait technique     Pain Level (0-10 scale) post treatment: 6/10    ASSESSMENT/Changes in Function:   Patient tolerated treatment well today. Pt ambulates into clinic with little to no right knee flexion. Focus treatment session on encouraging knee bend, with increased cues for proper gait technique. Post  Treatment session, pt able to ambulate with improved gait pattern and knee flexion. Patient will continue to benefit from skilled PT services to modify and progress therapeutic interventions, address functional mobility deficits, address ROM deficits, address strength deficits, analyze and address soft tissue restrictions, analyze and cue movement patterns, analyze and modify body mechanics/ergonomics, assess and modify postural abnormalities and instruct in home and community integration to attain remaining goals. [x]  See Plan of Care  []  See progress note/recertification  []  See Discharge Summary         Progress towards goals / Updated goals:  Short Term Goals: To be accomplished in 15 treatments. 1. Pt will increase knee flexion AROM by 10 degrees in order to improve pt ability to step into her bathtub. 2. Pt will increase knee extension AROM by 5 degrees in order to improve pt ability to ambulate with a normalized gait pattern   3. Pt will ambulate x500 feet with least restrictive device and no increase in pain in order to allow pt to navigate the aisle of the grocery store.      Long Term Goals: To be accomplished in 30  treatments.   1. Pt will increase knee flexion AROM to 105 degrees in order to improve pt ability negotiate stairs with a reciprocal gait pattern. 2. Pt will increase knee extension ROM to 0 degrees in order to improve pt ability to ambulate with no AD. 3. Pt will increase R knee strength to 5/5 in order to improve pt ability to squat to pick an object off of the floor.    4.Pt will negotiate x5 steps with least restrictive device and reciprocal gait pattern in order to improve pt ability to enter/exit her home without an exacerbation of symptoms.       PLAN  [x]  Upgrade activities as tolerated     [x]  Continue plan of care  []  Update interventions per flow sheet       []  Discharge due to:_  []  Other:_      Cornelius Anderson, PT, DPT 8/13/2021

## 2021-08-16 ENCOUNTER — HOSPITAL ENCOUNTER (OUTPATIENT)
Dept: PHYSICAL THERAPY | Age: 56
Discharge: HOME | End: 2021-08-16
Payer: OTHER GOVERNMENT

## 2021-08-16 PROCEDURE — 97110 THERAPEUTIC EXERCISES: CPT

## 2021-08-16 PROCEDURE — 97016 VASOPNEUMATIC DEVICE THERAPY: CPT

## 2021-08-16 NOTE — PROGRESS NOTES
PT DAILY TREATMENT NOTE 2-15    Patient Name: Jose Millard  Date:2021  : 1965  [x]  Patient  Verified  Payor: SHOSHANA / Plan: Jhonny Chaudhary 74 / Product Type:  /    In time:   Out time:   Total Treatment Time (min): 63  Visit #:  3    Treatment Area: Encounter for other specified surgical aftercare [Z48.89]    SUBJECTIVE  Pain Level (0-10 scale): 0/10  Any medication changes, allergies to medications, adverse drug reactions, diagnosis change, or new procedure performed?: [x] No    [] Yes (see summary sheet for update)  Subjective functional status/changes:   [] No changes reported  \"I have been using the bicycle at home a lot. I did a lot of walking this weekend, too. \"    OBJECTIVE      Modality rationale: decrease inflammation, decrease pain, increase tissue extensibility and increase muscle contraction/control to improve the patients ability to ambulate without limp   Min Type Additional Details       [] Estim: []Att   []Unatt    []TENS instruct                  []IFC  []Premod   []NMES                    []Other:  []w/US      []w/ heat  []w/ ice  Position:  Location:       []  Traction: [] Cervical       []Lumbar                       [] Prone          []Supine                       []Intermittent   []Continuous Lbs:  [] before manual  [] after manual  [] w/ heat  [] Simultaneously performed with w/ Estim    []  Ultrasound: []Continuous   [] Pulsed                       at: []1MHz   []3MHz Location:  W/cm2:    [] Paraffin         Location:   []w/heat    []  Ice     []  Heat  []  Ice massage Position:  Location:    []  Laser  []  Other: Position:  Location:     10 [x]  Vasopneumatic Device Pressure:       [] lo [x] med [] hi   [x] w/ ice      Temperature:  34  [] Simultaneously performed with w/ Estim     [x] Skin assessment post-treatment:  [x]intact []redness- no adverse reaction    []redness  adverse reaction:       50 min Therapeutic Exercise:  [] See flow sheet : Rationale: increase ROM, increase strength, improve coordination, improve balance and increase proprioception to improve the patients ability to ambulate without limp            With   [x] TE   [] TA   [] neuro   [] other: Patient Education: [x] Review HEP    [] Progressed/Changed HEP based on:   [] positioning   [] body mechanics   [] transfers   [] heat/ice application    [] other:      Other Objective/Functional Measures: focus on knee flexion to improve proper gait technique     Pain Level (0-10 scale) post treatment: 5/10    ASSESSMENT/Changes in Function:   Patient tolerated treatment well today. Pt ambulates into clinic with slightly improved right knee flexion as compared to prior treatment session. Continued to focus treatment session on encouraging knee bend, with increased cues for proper gait technique. Addition of treadmill to improve gait technique. DPT provides verbal and tactile cues during gait training on treadmill, each of which pt has poor carryover or change in technique with, despite ability to flex knee actively in sitting. Patient will continue to benefit from skilled PT services to modify and progress therapeutic interventions, address functional mobility deficits, address ROM deficits, address strength deficits, analyze and address soft tissue restrictions, analyze and cue movement patterns, analyze and modify body mechanics/ergonomics, assess and modify postural abnormalities and instruct in home and community integration to attain remaining goals. [x]  See Plan of Care  []  See progress note/recertification  []  See Discharge Summary         Progress towards goals / Updated goals:  Short Term Goals: To be accomplished in 15 treatments. 1. Pt will increase knee flexion AROM by 10 degrees in order to improve pt ability to step into her bathtub.   2. Pt will increase knee extension AROM by 5 degrees in order to improve pt ability to ambulate with a normalized gait pattern 3. Pt will ambulate x500 feet with least restrictive device and no increase in pain in order to allow pt to navigate the aisle of the grocery store.      Long Term Goals: To be accomplished in 30  treatments. 1. Pt will increase knee flexion AROM to 105 degrees in order to improve pt ability negotiate stairs with a reciprocal gait pattern. 2. Pt will increase knee extension ROM to 0 degrees in order to improve pt ability to ambulate with no AD. 3. Pt will increase R knee strength to 5/5 in order to improve pt ability to squat to pick an object off of the floor.    4.Pt will negotiate x5 steps with least restrictive device and reciprocal gait pattern in order to improve pt ability to enter/exit her home without an exacerbation of symptoms.       PLAN  [x]  Upgrade activities as tolerated     [x]  Continue plan of care  []  Update interventions per flow sheet       []  Discharge due to:_  []  Other:_      Tiago Winn, PT, DPT 8/16/2021

## 2021-08-18 ENCOUNTER — HOSPITAL ENCOUNTER (OUTPATIENT)
Dept: PHYSICAL THERAPY | Age: 56
Discharge: HOME | End: 2021-08-18
Payer: OTHER GOVERNMENT

## 2021-08-18 PROCEDURE — 97110 THERAPEUTIC EXERCISES: CPT

## 2021-08-18 PROCEDURE — 97016 VASOPNEUMATIC DEVICE THERAPY: CPT

## 2021-08-18 NOTE — PROGRESS NOTES
PT DAILY TREATMENT NOTE 2-15    Patient Name: Ruy Gorman  Date:2021  : 1965  [x]  Patient  Verified  Payor: SHOSHANA / Plan: Jhonny Chaudhary 74 / Product Type: [employer] /    In time:1400   Out time:11411  Total Treatment Time (min): 70  Visit #:  4    Treatment Area: Encounter for other specified surgical aftercare [Z48.89]    SUBJECTIVE  Pain Level (0-10 scale): 6/10  Any medication changes, allergies to medications, adverse drug reactions, diagnosis change, or new procedure performed?: [x] No    [] Yes (see summary sheet for update)  Subjective functional status/changes:   [] No changes reported  \"Pt reports the weather has her knee all stirred up today. \"    OBJECTIVE      Modality rationale: decrease edema, decrease inflammation and decrease pain to improve the patients ability to increase functional knee movement.    Min Type Additional Details       [] Estim: []Att   []Unatt    []TENS instruct                  []IFC  []Premod   []NMES                    []Other:  []w/US      []w/ heat  []w/ ice  Position:  Location:       []  Traction: [] Cervical       []Lumbar                       [] Prone          []Supine                       []Intermittent   []Continuous Lbs:  [] before manual  [] after manual  [] w/ heat  [] Simultaneously performed with w/ Estim    []  Ultrasound: []Continuous   [] Pulsed                       at: []1MHz   []3MHz Location:  W/cm2:    [] Paraffin         Location:   []w/heat    []  Ice     []  Heat  []  Ice massage Position:  Location:    []  Laser  []  Other: Position:  Location:     10 [x]  Vasopneumatic Device Pressure:       [] lo [] med [] hi   [x] w/ ice      Temperature: 35  [] Simultaneously performed with w/ Estim     [x] Skin assessment post-treatment:  [x]intact []redness- no adverse reaction    []redness  adverse reaction:       60 min Therapeutic Exercise:  [x] See flow sheet :   Rationale: increase ROM, increase strength and improve coordination to improve the patients ability to increase knee motion and functional amb with decreased AD. With   [] TE   [] TA   [] neuro   [] other: Patient Education: [x] Review HEP    [] Progressed/Changed HEP based on:   [] positioning   [] body mechanics   [] transfers   [] heat/ice application    [] other:        Pain Level (0-10 scale) post treatment: 0/10    ASSESSMENT/Changes in Function:   Patient tolerated treatment session with work on strengthening leg and ROM, checked incision site healing well and scar showing decrease in tightness. Pt notes compliance with HEP and request review of ex to make sure she is ok with what she is currently doing. Patient will continue to benefit from skilled PT services to modify and progress therapeutic interventions, address functional mobility deficits, address ROM deficits, address strength deficits and analyze and address soft tissue restrictions to attain remaining goals. [x]  See Plan of Care  []  See progress note/recertification  []  See Discharge Summary         Progress towards goals / Updated goals:  Short Term Goals: To be accomplished in 15 treatments. 1. Pt will increase knee flexion AROM by 10 degrees in order to improve pt ability to step into her bathtub. 2. Pt will increase knee extension AROM by 5 degrees in order to improve pt ability to ambulate with a normalized gait pattern   3. Pt will ambulate x500 feet with least restrictive device and no increase in pain in order to allow pt to navigate the aisle of the grocery store.   [address] be accomplished in 30  treatments. 1. Pt will increase knee flexion AROM to 105 degrees in order to improve pt ability negotiate stairs with a reciprocal gait pattern. 2. Pt will increase knee extension ROM to 0 degrees in order to improve pt ability to ambulate with no AD. 3. Pt will increase R knee strength to 5/5 in order to improve pt ability to squat to pick an object off of the floor. 4.Pt will negotiate x5 steps with least restrictive device and reciprocal gait pattern in order to improve pt ability to enter/exit her home without an exacerbation of symptoms.       PLAN  [x]  Upgrade activities as tolerated     [x]  Continue plan of care  []  Update interventions per flow sheet       []  Discharge due to:_  []  Other:_      Mehran Moise  8/18/2021

## 2021-08-20 ENCOUNTER — HOSPITAL ENCOUNTER (OUTPATIENT)
Dept: PHYSICAL THERAPY | Age: 56
Discharge: HOME | End: 2021-08-20
Payer: OTHER GOVERNMENT

## 2021-08-20 PROCEDURE — 97016 VASOPNEUMATIC DEVICE THERAPY: CPT

## 2021-08-20 PROCEDURE — 97110 THERAPEUTIC EXERCISES: CPT

## 2021-08-20 NOTE — PROGRESS NOTES
PT DAILY TREATMENT NOTE 2-15    Patient Name: Jose Cruz Upton  Date:2021  : 1965  [x]  Patient  Verified  Payor: SHOSHANA / Plan: Jhonny Chaudhary 74 / Product Type:  /    In time:235 pm Out time:345 pm  Total Treatment Time (min): 70  Visit #:  5    Treatment Area: Encounter for other specified surgical aftercare [Z48.89]    SUBJECTIVE  Pain Level (0-10 scale): 4/10  Any medication changes, allergies to medications, adverse drug reactions, diagnosis change, or new procedure performed?: [x] No    [] Yes (see summary sheet for update)  Subjective functional status/changes:   [] No changes reported  \"Pt reports episodes of sharp stinging pain with certain motions. \"    OBJECTIVE      Modality rationale: decrease edema, decrease inflammation and decrease pain to improve the patients ability to increase knee motion    Min Type Additional Details       [] Estim: []Att   []Unatt    []TENS instruct                  []IFC  []Premod   []NMES                    []Other:  []w/US      []w/ heat  []w/ ice  Position:  Location:       []  Traction: [] Cervical       []Lumbar                       [] Prone          []Supine                       []Intermittent   []Continuous Lbs:  [] before manual  [] after manual  [] w/ heat  [] Simultaneously performed with w/ Estim    []  Ultrasound: []Continuous   [] Pulsed                       at: []1MHz   []3MHz Location:  W/cm2:    [] Paraffin         Location:   []w/heat    []  Ice     []  Heat  []  Ice massage Position:  Location:    []  Laser  []  Other: Position:  Location:     10 [x]  Vasopneumatic Device Pressure:       [] lo [x] med [] hi   [x] w/ ice      Temperature: 36  [] Simultaneously performed with w/ Estim     [x] Skin assessment post-treatment:  [x]intact []redness- no adverse reaction    []redness  adverse reaction:       60 min Therapeutic Exercise:  [x] See flow sheet :   Rationale: increase ROM, increase strength and improve coordination to improve the patients ability to increase knee motion               With   [] TE   [] TA   [] neuro   [] other: Patient Education: [x] Review HEP    [] Progressed/Changed HEP based on:   [] positioning   [] body mechanics   [] transfers   [] heat/ice application    [] other:         Pain Level (0-10 scale) post treatment: 4/10    ASSESSMENT/Changes in Function:   Patient tolerated treatment session with stretching and strengthening ex. Pt did have a issue with sharp pain in her knee with flex stretch. Pt notes compliance with HEP. Patient will continue to benefit from skilled PT services to modify and progress therapeutic interventions, address functional mobility deficits, address ROM deficits, address strength deficits, analyze and address soft tissue restrictions and analyze and cue movement patterns to attain remaining goals. [x]  See Plan of Care  []  See progress note/recertification  []  See Discharge Summary         Progress towards goals / Updated goals:  Short Term Goals: To be accomplished in 15 treatments. 1. Pt will increase knee flexion AROM by 10 degrees in order to improve pt ability to step into her bathtub. 2. Pt will increase knee extension AROM by 5 degrees in order to improve pt ability to ambulate with a normalized gait pattern   3. Pt will ambulate x500 feet with least restrictive device and no increase in pain in order to allow pt to navigate the aisle of the grocery store.   [address] be accomplished in 30  treatments. 1. Pt will increase knee flexion AROM to 105 degrees in order to improve pt ability negotiate stairs with a reciprocal gait pattern. 2. Pt will increase knee extension ROM to 0 degrees in order to improve pt ability to ambulate with no AD. 3. Pt will increase R knee strength to 5/5 in order to improve pt ability to squat to pick an object off of the floor.    4.Pt will negotiate x5 steps with least restrictive device and reciprocal gait pattern in order to improve pt ability to enter/exit her home without an exacerbation of symptoms.          PLAN  [x]  Upgrade activities as tolerated     [x]  Continue plan of care  []  Update interventions per flow sheet       []  Discharge due to:_  []  Other:_      Adrián Cooper [employer] 8/20/2021

## 2021-08-23 ENCOUNTER — HOSPITAL ENCOUNTER (OUTPATIENT)
Dept: PHYSICAL THERAPY | Age: 56
Discharge: HOME | End: 2021-08-23
Payer: OTHER GOVERNMENT

## 2021-08-23 PROCEDURE — 97110 THERAPEUTIC EXERCISES: CPT

## 2021-08-23 NOTE — PROGRESS NOTES
PT DAILY TREATMENT NOTE 2-15    Patient Name: Jose Cruz Upton  Date:2021  : 1965  [x]  Patient  Verified  Payor: SHOSHANA / Plan: Jhonny Chaudhary 74 / Product Type:  /    In time: 14:03  Out time:15:02  Total Treatment Time (min): 61  Visit #: 6    Treatment Area: Encounter for other specified surgical aftercare [Z48.89]    SUBJECTIVE  Pain Level (0-10 scale): 0/10  Any medication changes, allergies to medications, adverse drug reactions, diagnosis change, or new procedure performed?: [x] No    [] Yes (see summary sheet for update)  Subjective functional status/changes:   [x] No changes reported    OBJECTIVE  Modality rationale: decrease edema, decrease inflammation and decrease pain to improve the patients ability to ambulate with normalized gait pattern.    Min Type Additional Details       [] Estim: []Att   []Unatt    []TENS instruct                  []IFC  []Premod   []NMES                    []Other:  []w/US      []w/ heat  []w/ ice  Position:  Location:       []  Traction: [] Cervical       []Lumbar                       [] Prone          []Supine                       []Intermittent   []Continuous Lbs:  [] before manual  [] after manual  [] w/ heat  [] Simultaneously performed with w/ Estim    []  Ultrasound: []Continuous   [] Pulsed                       at: []1MHz   []3MHz Location:  W/cm2:    [] Paraffin         Location:   []w/heat   NB [x]  Ice     []  Heat  []  Ice massage Position: Seated  Location: Ice provided with heel prop stretch    []  Laser  []  Other: Position:  Location:      []  Vasopneumatic Device Pressure:       [] lo [] med [] hi   [] w/ ice      Temperature:   [] Simultaneously performed with w/ Estim     [x] Skin assessment post-treatment:  [x]intact [x]redness- no adverse reaction    []redness  adverse reaction:       59 min Therapeutic Exercise:  [x] See flow sheet :   Rationale: increase ROM and increase strength to improve the patients ability to ambulate with normalized gait pattern. With   [x] TE   [] TA   [] neuro   [] other: Patient Education: [x] Review HEP    [] Progressed/Changed HEP based on:   [] positioning   [] body mechanics   [] transfers   [] heat/ice application    [] other:      Pain Level (0-10 scale) post treatment: 4/10    ASSESSMENT/Changes in Function:   Patient tolerated treatment well. Pt continues to demonstrate restrictions of R knee movement into knee flexion and extension. Emphasis of treatment session was on improving knee AROM. Pt requires verbal cueing with ambulation to increase knee flexion at terminal stance. Continue to progress as able. Patient will continue to benefit from skilled PT services to modify and progress therapeutic interventions, address functional mobility deficits, address ROM deficits, address strength deficits, analyze and address soft tissue restrictions, analyze and cue movement patterns and analyze and modify body mechanics/ergonomics to attain remaining goals. [x]  See Plan of Care  []  See progress note/recertification  []  See Discharge Summary         Progress towards goals / Updated goals:  Short Term Goals: To be accomplished in 15 treatments. 1. Pt will increase knee flexion AROM by 10 degrees in order to improve pt ability to step into her bathtub. 2. Pt will increase knee extension AROM by 5 degrees in order to improve pt ability to ambulate with a normalized gait pattern   3. Pt will ambulate x500 feet with least restrictive device and no increase in pain in order to allow pt to navigate the aisle of the grocery store.   [address] be accomplished in 30  treatments. 1. Pt will increase knee flexion AROM to 105 degrees in order to improve pt ability negotiate stairs with a reciprocal gait pattern. 2. Pt will increase knee extension ROM to 0 degrees in order to improve pt ability to ambulate with no AD.    3. Pt will increase R knee strength to 5/5 in order to improve pt ability to squat to pick an object off of the floor. 4.Pt will negotiate x5 steps with least restrictive device and reciprocal gait pattern in order to improve pt ability to enter/exit her home without an exacerbation of symptoms.     PLAN  [x]  Upgrade activities as tolerated     [x]  Continue plan of care  []  Update interventions per flow sheet       []  Discharge due to:_  []  Other:_      Francesco Burnette, PT, DPT 8/23/2021

## 2021-08-25 ENCOUNTER — HOSPITAL ENCOUNTER (OUTPATIENT)
Dept: PHYSICAL THERAPY | Age: 56
Discharge: HOME | End: 2021-08-25
Payer: OTHER GOVERNMENT

## 2021-08-25 PROCEDURE — 97140 MANUAL THERAPY 1/> REGIONS: CPT

## 2021-08-25 PROCEDURE — 97110 THERAPEUTIC EXERCISES: CPT

## 2021-08-25 NOTE — PROGRESS NOTES
PT DAILY TREATMENT NOTE 2-15    Patient Name: Connor Brooks  Date:2021  : 1965  [x]  Patient  Verified  Payor: SHOSHANA / Plan: Jhonny Chaudhary 75 / Product Type: Jose Shoe /    In time:210   Out time:317  Total Treatment Time (min): 79  Visit #:  7    Treatment Area: Encounter for other specified surgical aftercare [Z48.89]    SUBJECTIVE  Pain Level (0-10 scale): 0/10  Any medication changes, allergies to medications, adverse drug reactions, diagnosis change, or new procedure performed?: [x] No    [] Yes (see summary sheet for update)  Subjective functional status/changes:   [] No changes reported  \"Pt reports seeing her DR and he is referring her to another Dr. Jenn Sarabia will follow up from now on and is to set up a time for a knee manipulation. \"    OBJECTIVE      Modality rationale: decrease edema, decrease inflammation and decrease pain to improve the patients ability to increase functional knee motion    Min Type Additional Details       [] Estim: []Att   []Unatt    []TENS instruct                  []IFC  []Premod   []NMES                    []Other:  []w/US      []w/ heat  []w/ ice  Position:  Location:       []  Traction: [] Cervical       []Lumbar                       [] Prone          []Supine                       []Intermittent   []Continuous Lbs:  [] before manual  [] after manual  [] w/ heat  [] Simultaneously performed with w/ Estim    []  Ultrasound: []Continuous   [] Pulsed                       at: []1MHz   []3MHz Location:  W/cm2:    [] Paraffin         Location:   []w/heat   10 [x]  Ice     []  Heat  []  Ice massage Position: seated   Location: around L knee     []  Laser  []  Other: Position:  Location:      []  Vasopneumatic Device Pressure:       [] lo [] med [] hi   [] w/ ice      Temperature:   [] Simultaneously performed with w/ Estim     [x] Skin assessment post-treatment:  [x]intact []redness- no adverse reaction    []redness  adverse reaction:       42 min Therapeutic Exercise:  [x] See flow sheet :   Rationale: increase ROM, increase strength and improve coordination to improve the patients ability to increase funtional ROM of knee        15 min Manual Therapy:  Hawk    Rationale: decrease pain, increase ROM and increase tissue extensibility  to improve the patients ability to increase soft tissue mobility and increase active ROM       With   [] TE   [] TA   [] neuro   [] other: Patient Education: [x] Review HEP    [] Progressed/Changed HEP based on:   [] positioning   [] body mechanics   [] transfers   [] heat/ice application    [] other:          Pain Level (0-10 scale) post treatment: 3/10    ASSESSMENT/Changes in Function:   Patient tolerated treatment increased attention on knee motion scar, patella and flex/ext in general. Pt tolerated use of hawk  on her anterior knee and scar and well as prone for posterior  With prone hanging stretch. Pt notes compliance with HEP. Note pt is still very guarded with movement and does not allow much knee motion with normal amb. Patient will continue to benefit from skilled PT services to modify and progress therapeutic interventions, address functional mobility deficits, address ROM deficits and address strength deficits to attain remaining goals. [x]  See Plan of Care  []  See progress note/recertification  []  See Discharge Summary         Progress towards goals / Updated goals:  Short Term Goals: To be accomplished in 15 treatments. 1. Pt will increase knee flexion AROM by 10 degrees in order to improve pt ability to step into her bathtub. 2. Pt will increase knee extension AROM by 5 degrees in order to improve pt ability to ambulate with a normalized gait pattern   3. Pt will ambulate x500 feet with least restrictive device and no increase in pain in order to allow pt to navigate the aisle of the grocery store.   [address] be accomplished in 30  treatments.   1. Pt will increase knee flexion AROM to 105 degrees in order to improve pt ability negotiate stairs with a reciprocal gait pattern. 2. Pt will increase knee extension ROM to 0 degrees in order to improve pt ability to ambulate with no AD. 3. Pt will increase R knee strength to 5/5 in order to improve pt ability to squat to pick an object off of the floor. 4.Pt will negotiate x5 steps with least restrictive device and reciprocal gait pattern in order to improve pt ability to enter/exit her home without an exacerbation of symptoms.       PLAN  [x]  Upgrade activities as tolerated     [x]  Continue plan of care  []  Update interventions per flow sheet       []  Discharge due to:_  []  Other:_      Lily Crow 8/25/2021

## 2021-08-27 ENCOUNTER — HOSPITAL ENCOUNTER (OUTPATIENT)
Dept: PHYSICAL THERAPY | Age: 56
Discharge: HOME | End: 2021-08-27
Payer: OTHER GOVERNMENT

## 2021-08-27 PROCEDURE — 97140 MANUAL THERAPY 1/> REGIONS: CPT

## 2021-08-27 PROCEDURE — 97110 THERAPEUTIC EXERCISES: CPT

## 2021-08-27 PROCEDURE — 97016 VASOPNEUMATIC DEVICE THERAPY: CPT

## 2021-08-27 NOTE — PROGRESS NOTES
PT DAILY TREATMENT NOTE 2-15    Patient Name: Koki Recio  Date:2021  : 1965  [x]  Patient  Verified  Payor: SHOSHANA / Plan: Jhonny Chaudhary 75 / Product Type: Myrnachidi Barker /    In time:1404  Out time:1505  Total Treatment Time (min):61  Visit #:  8    Treatment Area: Encounter for other specified surgical aftercare [Z48.89]    SUBJECTIVE  Pain Level (0-10 scale): 0/10  Any medication changes, allergies to medications, adverse drug reactions, diagnosis change, or new procedure performed?: [x] No    [] Yes (see summary sheet for update)  Subjective functional status/changes:   [] No changes reported  \"pt reports some increased motion since last visit, still tight. .\"    OBJECTIVE      Modality rationale: decrease edema, decrease inflammation, decrease pain and increase tissue extensibility to improve the patients ability to increase knee motion    Min Type Additional Details       [] Estim: []Att   []Unatt    []TENS instruct                  []IFC  []Premod   []NMES                    []Other:  []w/US      []w/ heat  []w/ ice  Position:  Location:       []  Traction: [] Cervical       []Lumbar                       [] Prone          []Supine                       []Intermittent   []Continuous Lbs:  [] before manual  [] after manual  [] w/ heat  [] Simultaneously performed with w/ Estim    []  Ultrasound: []Continuous   [] Pulsed                       at: []1MHz   []3MHz Location:  W/cm2:    [] Paraffin         Location:   []w/heat    []  Ice     []  Heat  []  Ice massage Position:  Location:    []  Laser  []  Other: Position:  Location:     10 [x]  Vasopneumatic Device Pressure:       [] lo [] med [x] hi   [x] w/ ice      Temperature: 36  [] Simultaneously performed with w/ Estim     [x] Skin assessment post-treatment:  [x]intact []redness- no adverse reaction    []redness  adverse reaction:     30 min Therapeutic Exercise:  [x] See flow sheet :   Rationale: increase ROM, increase strength and improve coordination to improve the patients ability to increase functional motion         21 min Manual Therapy:  Hawk , scar mob and PROM stretching    Rationale: decrease pain, increase ROM and increase tissue extensibility  to improve the patients ability to increase patella glide and mobility of soft tissue for both flex and ext. With   [] TE   [] TA   [] neuro   [] other: Patient Education: [x] Review HEP    [] Progressed/Changed HEP based on:   [] positioning   [] body mechanics   [] transfers   [] heat/ice application    [] other:         Pain Level (0-10 scale) post treatment: *0/10    ASSESSMENT/Changes in Function:   Patient tolerated treatment session with continued work on ROM of patella and scar to increase active and passive motion of knee. Game ready post ex with ext stetch. Pt notes compliance with HEP. Patient will continue to benefit from skilled PT services to modify and progress therapeutic interventions, address functional mobility deficits, address ROM deficits, address strength deficits, analyze and address soft tissue restrictions and analyze and cue movement patterns to attain remaining goals. [x]  See Plan of Care  []  See progress note/recertification  []  See Discharge Summary         Progress towards goals / Updated goals:  Short Term Goals: To be accomplished in 15 treatments. 1. Pt will increase knee flexion AROM by 10 degrees in order to improve pt ability to step into her bathtub. 2. Pt will increase knee extension AROM by 5 degrees in order to improve pt ability to ambulate with a normalized gait pattern   3. Pt will ambulate x500 feet with least restrictive device and no increase in pain in order to allow pt to navigate the aisle of the grocery store.   [address] be accomplished in 30  treatments. 1. Pt will increase knee flexion AROM to 105 degrees in order to improve pt ability negotiate stairs with a reciprocal gait pattern.   2. Pt will increase knee extension ROM to 0 degrees in order to improve pt ability to ambulate with no AD. 3. Pt will increase R knee strength to 5/5 in order to improve pt ability to squat to pick an object off of the floor. 4.Pt will negotiate x5 steps with least restrictive device and reciprocal gait pattern in order to improve pt ability to enter/exit her home without an exacerbation of symptoms.       PLAN  [x]  Upgrade activities as tolerated     [x]  Continue plan of care  []  Update interventions per flow sheet       []  Discharge due to:_  []  Other:_      Charo Love, [facility] 8/27/2021

## 2021-08-30 ENCOUNTER — HOSPITAL ENCOUNTER (OUTPATIENT)
Dept: PHYSICAL THERAPY | Age: 56
Discharge: HOME | End: 2021-08-30
Payer: OTHER GOVERNMENT

## 2021-08-30 PROCEDURE — 97110 THERAPEUTIC EXERCISES: CPT

## 2021-08-30 PROCEDURE — 97016 VASOPNEUMATIC DEVICE THERAPY: CPT

## 2021-08-30 NOTE — PROGRESS NOTES
PT DAILY TREATMENT NOTE 2-15    Patient Name: Koki Recio  Date:2021  : 1965  [x]  Patient  Verified  Payor: [facility] / Plan: [facility] / Product Type: Myrna Barker /    In time:1402  Out time:1510  Total Treatment Time (min): 68  Visit #:  9    Treatment Area: Encounter for other specified surgical aftercare [Z48.89]    SUBJECTIVE  Pain Level (0-10 scale): 0/10  Any medication changes, allergies to medications, adverse drug reactions, diagnosis change, or new procedure performed?: [x] No    [] Yes (see summary sheet for update)  Subjective functional status/changes:   [] No changes reported  \"Pt reports doing the stretching at home. \"    OBJECTIVE      Modality rationale: decrease edema, decrease inflammation and decrease pain to improve the patients ability to increase knee motion    Min Type Additional Details       [] Estim: []Att   []Unatt    []TENS instruct                  []IFC  []Premod   []NMES                    []Other:  []w/US      []w/ heat  []w/ ice  Position:  Location:       []  Traction: [] Cervical       []Lumbar                       [] Prone          []Supine                       []Intermittent   []Continuous Lbs:  [] before manual  [] after manual  [] w/ heat  [] Simultaneously performed with w/ Estim    []  Ultrasound: []Continuous   [] Pulsed                       at: []1MHz   []3MHz Location:  W/cm2:    [] Paraffin         Location:   []w/heat    []  Ice     []  Heat  []  Ice massage Position:  Location:    []  Laser  []  Other: Position:  Location:     10 [x]  Vasopneumatic Device Pressure:       [] lo [x] med [] hi   [x] w/ ice      Temperature: 36  [] Simultaneously performed with w/ Estim     [x] Skin assessment post-treatment:  [x]intact []redness- no adverse reaction    []redness  adverse reaction:       58 min Therapeutic Exercise:  [x] See flow sheet :   Rationale: increase ROM, increase strength and improve coordination to improve the patients ability to increase knee functional activity and motion with no c/o. With   [] TE   [] TA   [] neuro   [] other: Patient Education: [x] Review HEP    [] Progressed/Changed HEP based on:   [] positioning   [] body mechanics   [] transfers   [] heat/ice application    [] other:           Pain Level (0-10 scale) post treatment: 0/10    ASSESSMENT/Changes in Function:   Patient tolerated treatment session with increased resistance and activity to push both flexion and ext one into another. Pt still guarding with her flexion during functional motion. Pt notes compliance with HEP. And addition of scar and patella mobs. Patient will continue to benefit from skilled PT services to modify and progress therapeutic interventions, address functional mobility deficits, address ROM deficits, address strength deficits and analyze and address soft tissue restrictions to attain remaining goals. [x]  See Plan of Care  []  See progress note/recertification  []  See Discharge Summary         Progress towards goals / Updated goals:  Short Term Goals: To be accomplished in 15 treatments. 1. Pt will increase knee flexion AROM by 10 degrees in order to improve pt ability to step into her bathtub. 2. Pt will increase knee extension AROM by 5 degrees in order to improve pt ability to ambulate with a normalized gait pattern   3. Pt will ambulate x500 feet with least restrictive device and no increase in pain in order to allow pt to navigate the aisle of the grocery store.   [address] be accomplished in 30  treatments. 1. Pt will increase knee flexion AROM to 105 degrees in order to improve pt ability negotiate stairs with a reciprocal gait pattern. 2. Pt will increase knee extension ROM to 0 degrees in order to improve pt ability to ambulate with no AD. 3. Pt will increase R knee strength to 5/5 in order to improve pt ability to squat to pick an object off of the floor.    4.Pt will negotiate x5 steps with least restrictive device and reciprocal gait pattern in order to improve pt ability to enter/exit her home without an exacerbation of symptoms.          PLAN  [x]  Upgrade activities as tolerated     [x]  Continue plan of care  []  Update interventions per flow sheet       []  Discharge due to:_  []  Other:_      Diamante Huynh 8/30/2021

## 2021-09-01 ENCOUNTER — APPOINTMENT (OUTPATIENT)
Dept: PHYSICAL THERAPY | Age: 56
End: 2021-09-01
Payer: OTHER GOVERNMENT

## 2021-09-03 ENCOUNTER — HOSPITAL ENCOUNTER (OUTPATIENT)
Dept: PHYSICAL THERAPY | Age: 56
Discharge: HOME | End: 2021-09-03
Payer: OTHER GOVERNMENT

## 2021-09-03 PROCEDURE — 97140 MANUAL THERAPY 1/> REGIONS: CPT

## 2021-09-03 PROCEDURE — 97110 THERAPEUTIC EXERCISES: CPT

## 2021-09-03 NOTE — PROGRESS NOTES
PT DAILY TREATMENT NOTE 2-15    Patient Name: Martha Buckley  Date:9/3/2021  : 1965  [x]  Patient  Verified  Payor: SHOSHANA / Plan: Jhonny Chaudhary 74 / Product Type: [address] /    In time:1402  Out time:1512  Total Treatment Time (min): 70  Visit #:  10    Treatment Area: Encounter for other specified surgical aftercare [Z48.89]    SUBJECTIVE  Pain Level (0-10 scale): 0/10  Any medication changes, allergies to medications, adverse drug reactions, diagnosis change, or new procedure performed?: [x] No    [] Yes (see summary sheet for update)  Subjective functional status/changes:   [] No changes reported  \"I have been riding the bike at home. \"    OBJECTIVE    Gait and Functional Mobility:  Ambulating with L quad cane; pt demonstrated decreased L knee flexion/extension in gait and an antalgic gait pattern with pt spending increased stance time on L. Palpation: Pt reports TTP along medial aspect of knee and medial/lateral insertion               Hip:   Strength AROM PROM       Right Left             Flexion 5/5 5/5           Knee:   Strength AROM PROM       Right Left Right Left Right Left     Flexion 5/5 5/5 72   76       Extension 5/5 5/5 10   6     Ankle   Strength AROM PROM       Right Left Right Left Right Left     Dorsiflexion 5/5 5/5             Plantarflexion 5/5 5/5           *All strength measures are on a scale with 5 as a maximum, if a space is left blank it was not tested.   All ROM measurements are measured in degrees.     Flexibility: Increased R hamstring tightness and muscle guarding  Girth measurements:               Mid-patella:     R- 44cm                                       L- 39cm    Modality rationale: decrease edema, decrease inflammation and decrease pain to improve the patients ability to increase knee motion    Min Type Additional Details       [] Estim: []Att   []Unatt    []TENS instruct                  []IFC  []Premod   []NMES                    []Other:  []w/US []w/ heat  []w/ ice  Position:  Location:       []  Traction: [] Cervical       []Lumbar                       [] Prone          []Supine                       []Intermittent   []Continuous Lbs:  [] before manual  [] after manual  [] w/ heat  [] Simultaneously performed with w/ Estim    []  Ultrasound: []Continuous   [] Pulsed                       at: []1MHz   []3MHz Location:  W/cm2:    [] Paraffin         Location:   []w/heat   10 [x]  Ice     []  Heat  []  Ice massage Position: seated  Location:right knee    []  Laser  []  Other: Position:  Location:      []  Vasopneumatic Device Pressure:       [] lo [] med [] hi   [] w/ ice      Temperature: 36  [] Simultaneously performed with w/ Estim     [x] Skin assessment post-treatment:  [x]intact []redness- no adverse reaction    []redness - adverse reaction:       40 min Therapeutic Exercise:  [x] See flow sheet :   Rationale: increase ROM, increase strength and improve coordination to improve the patients ability to increase knee functional activity and motion with no c/o.     20 min Manual Therapy: PROM, patellofemoral joint mobilizations, and scar mobility provided to right LE    Rationale: decrease pain, increase ROM, increase tissue extensibility and decrease edema  to improve the patients ability to ambulate without a limp. With   [x] TE   [] TA   [] neuro   [] other: Patient Education: [x] Review HEP    [] Progressed/Changed HEP based on:   [] positioning   [] body mechanics   [] transfers   [] heat/ice application    [] other:           Pain Level (0-10 scale) post treatment: 0/10    ASSESSMENT/Changes in Function:   Pt is post op R TKA on 6/28/2021 after completing 4 weeks of home health physical therapy. Pt has participated in 10 visits of outpatient physical therapy since ie.   Patient presented to physical therapy with right knee pain, weakness and decreased range of motion limiting all weight bearing activities, mostly consistent with post operative status. Pt has received treatment including range of motion, strengthening, modalities for pain control, balance, and proprioception activities. Pt has demonstrated very minimal to no gains with arom since ie. Pt is able to ambulate independently with no AD, however still relies on [address] [place] [address] for long distance gait and uses an electric chair for grocery shopping. Pt appears compliant with HEP, with use of treadmill, bicycle, and continued stretching at home. Pt has been compliant with attendance to physical therapy. DPT has provided manual therapy, joint mobilizations, scar mobility, IASTM instruments, as well as traditional passive stretching and strengthening exercises to attempt to regain rom and mobility. Pt continues to present with arom deficits, resulting in difficulty with gait and functional activities. Pt reports MD has discussed possible manipulation, which would also be recommended by DPT in this case, upon MD approval.  Pt has an extensive history of left knee conditions and procedures, which could possibly be contributing to her lack of progress and continued deficits. Pt would benefit from further skilled physical therapy interventions to continue to progress range of motion, strength, and balance, allowing for improved function. Patient tolerated treatment session fair today. Progress not completed with updated measurements in appropriate places. Increased education provided to pt regarding limited to no progress since IE, importance of compliance with HEP, and education regarding possible manipulation which she state MD has suggested. Patient will continue to benefit from skilled PT services to modify and progress therapeutic interventions, address functional mobility deficits, address ROM deficits, address strength deficits and analyze and address soft tissue restrictions to attain remaining goals.      [x]  See Plan of Care  []  See progress note/recertification  []  See Discharge Summary         Progress towards goals / Updated goals:  Short Term Goals: To be accomplished in 15 treatments. 1. Pt will increase knee flexion AROM by 10 degrees in order to improve pt ability to step into her bathtub. -Partially Met (patient uses walk in shower. Has not tried the bathtub yet.)  2. Pt will increase knee extension AROM by 5 degrees in order to improve pt ability to ambulate with a normalized gait pattern  -Partially Met   3. Pt will ambulate x500 feet with least restrictive device and no increase in pain in order to allow pt to navigate the aisle of the grocery store. -Partially Met (knee lory sometimes, uses cart in the store. Uses cane when she comes out of house. No AD within home.)     Long Term Goals: To be accomplished in 30  treatments. 1. Pt will increase knee flexion AROM to 105 degrees in order to improve pt ability negotiate stairs with a reciprocal gait pattern. -Partially met  2. Pt will increase knee extension ROM to 0 degrees in order to improve pt ability to ambulate with no AD. -Partially Met  3. Pt will increase R knee strength to 5/5 in order to improve pt ability to squat to pick an object off of the floor. -Met  4. Pt will negotiate x5 steps with least restrictive device and reciprocal gait pattern in order to improve pt ability to enter/exit her home without an exacerbation of symptoms. -Partially met.  (uses step to gait pattern.)          PLAN  [x]  Upgrade activities as tolerated     [x]  Continue plan of care  []  Update interventions per flow sheet       []  Discharge due to:_  []  Other:_      Dina Conn, PT, DPT 9/3/2021

## 2021-09-03 NOTE — PROGRESS NOTES
[address], [place]  Ph: [phone]    Fax: [phone]    Progress Report  2-15    Patient name: Brandie Marmolejo  : 1965  Provider#: [id number]  Referral source: Dejan Gr MD      Medical/Treatment Diagnosis: Encounter for other specified surgical aftercare [Z48.89]     Prior Hospitalization: see medical history     Comorbidities: See Plan of Care  Prior Level of Function:See Plan of Care  Medications: Verified on Patient Summary List    Start of Care: 8/10/2021      Onset Date:2021   Visits from Start of Care: 10 Missed Visits: 1  Reporting Period : 8/10/2021 to 9/3/2021      ASSESSMENT/SUMMARY OF CARE:   Pt is post op R TKA on 2021 after completing 4 weeks of home health physical therapy. Pt has participated in 10 visits of outpatient physical therapy since ie. Patient presented to physical therapy with right knee pain, weakness and decreased range of motion limiting all weight bearing activities, mostly consistent with post operative status. Pt has received treatment including range of motion, strengthening, modalities for pain control, balance, and proprioception activities. Pt has demonstrated very minimal to no gains with arom since ie. Pt is able to ambulate independently with no AD, however still relies on [facility] for long distance gait and uses an electric chair for grocery shopping. Pt appears compliant with HEP, with use of treadmill, bicycle, and continued stretching at home. Pt has been compliant with attendance to physical therapy. DPT has provided manual therapy, joint mobilizations, scar mobility, IASTM instruments, as well as traditional passive stretching and strengthening exercises to attempt to regain rom and mobility. Pt continues to present with arom deficits, resulting in difficulty with gait and functional activities.   Pt reports MD has discussed possible manipulation, which would also be recommended by DPT in this case, upon MD approval.  Pt has an extensive history of left knee conditions and procedures, which could possibly be contributing to her lack of progress and continued deficits. Pt would benefit from further skilled physical therapy interventions to continue to progress range of motion, strength, and balance, allowing for improved function. Progress towards goals / Updated goals:  Short Term Goals: To be accomplished in 15 treatments. 1. Pt will increase knee flexion AROM by 10 degrees in order to improve pt ability to step into her bathtub. -Partially Met (patient uses walk in shower. Has not tried the bathtub yet.)  2. Pt will increase knee extension AROM by 5 degrees in order to improve pt ability to ambulate with a normalized gait pattern  -Partially Met   3. Pt will ambulate x500 feet with least restrictive device and no increase in pain in order to allow pt to navigate the aisle of the grocery store. -Partially Met (knee lory sometimes, uses cart in the store. Uses cane when she comes out of house. No AD within home.)     Long Term Goals: To be accomplished in 30  treatments. 1. Pt will increase knee flexion AROM to 105 degrees in order to improve pt ability negotiate stairs with a reciprocal gait pattern. -Partially met  2. Pt will increase knee extension ROM to 0 degrees in order to improve pt ability to ambulate with no AD. -Partially Met  3. Pt will increase R knee strength to 5/5 in order to improve pt ability to squat to pick an object off of the floor. -Met  4. Pt will negotiate x5 steps with least restrictive device and reciprocal gait pattern in order to improve pt ability to enter/exit her home without an exacerbation of symptoms. -Partially met.  (uses step to gait pattern.)          ________________________________________________________________________  NOTE TO PHYSICIAN:  Please complete the following and fax to:  [facility]:   Fax: [phone] this original for your records. If you are unable to process this request in 24 hours, please contact our office.        ____ I have read the above report and request that my patient continue therapy with the following changes/special instructions:  ____ I have read the above report and request that my patient be discharged from therapy    Physician's Signature:_________________ Date:___________Time:__________

## 2021-09-10 ENCOUNTER — HOSPITAL ENCOUNTER (OUTPATIENT)
Dept: PHYSICAL THERAPY | Age: 56
Discharge: HOME | End: 2021-09-10
Payer: OTHER GOVERNMENT

## 2021-09-10 PROCEDURE — 97110 THERAPEUTIC EXERCISES: CPT

## 2021-09-10 PROCEDURE — 97016 VASOPNEUMATIC DEVICE THERAPY: CPT

## 2021-09-10 NOTE — PROGRESS NOTES
PT DAILY TREATMENT NOTE - [facility] 2-15    Patient Name: Brandie Marmolejo  Date:9/10/2021  : 1965  [x]  Patient  Verified  Payor: SHOSHANA / Plan: Jhonny Chaudhary 74 / Product Type: [address] /    In time:1337  Out time:1440   Total Treatment Time (min): 61  Visit #:  11    Treatment Area: Encounter for other specified surgical aftercare [Z48.89]    SUBJECTIVE  Pain Level (0-10 scale): 0/10  Any medication changes, allergies to medications, adverse drug reactions, diagnosis change, or new procedure performed?: [x] No    [] Yes (see summary sheet for update)  Subjective functional status/changes:   [] No changes reported  \"Pt reports seeing  And she is to continue to work on increasing her knee flex and scar mobility . \"    OBJECTIVE    Modality rationale: decrease edema, decrease inflammation, decrease pain and increase tissue extensibility to improve the patients ability to increase knee motion    Min Type Additional Details       [] Estim: []Att   []Unatt    []TENS instruct                  []IFC  []Premod   []NMES                    []Other:  []w/US      []w/ heat  []w/ ice  Position:  Location:       []  Traction: [] Cervical       []Lumbar                       [] Prone          []Supine                       []Intermittent   []Continuous Lbs:  [] before manual  [] after manual  [] w/ heat  [] Simultaneously performed with w/ Estim    []  Ultrasound: []Continuous   [] Pulsed                       at: []1MHz   []3MHz Location:  W/cm2:    [] Paraffin         Location:   []w/heat    []  Ice     []  Heat  []  Ice massage Position:  Location:    []  Laser  []  Other: Position:  Location:     10 [x]  Vasopneumatic Device Pressure:       [] lo [x] med [x] hi   [x] w/ ice      Temperature: 36  [] Simultaneously performed with w/ Estim     [x] Skin assessment post-treatment:  [x]intact []redness- no adverse reaction     []redness - adverse reaction:     53 min Therapeutic Exercise:  [x] See flow sheet : Rationale: increase ROM, increase strength and improve coordination to improve the patients ability to increase knee motion emphasis on flexion        With   [] TE   [] TA   [] neuro   [] other: Patient Education: [x] Review HEP    [] Progressed/Changed HEP based on:   [] positioning   [] body mechanics   [] transfers   [] heat/ice application    [] other:          Pain Level (0-10 scale) post treatment:0/10    ASSESSMENT/Changes in Function:   The pt tolerated treatment session with continued attention to increasing her motion and reduce tightness in eschar of incision cite. Work on increasing knee flex and correction of gait with more ankle motion to encourage knee movement. Pt notes compliance with HEP. .   Patient will continue to benefit from skilled PT services to modify and progress therapeutic interventions, address functional mobility deficits, address ROM deficits, address strength deficits, analyze and address soft tissue restrictions and analyze and cue movement patterns to attain remaining goals     [x]  See Plan of Care  []  See progress note/recertification  []  See Discharge Summary         Progress towards goals / Updated goals:  Short Term Goals: To be accomplished in 15 treatments. 1. Pt will increase knee flexion AROM by 10 degrees in order to improve pt ability to step into her bathtub. -Partially Met (patient uses walk in shower. Has not tried the bathtub yet.)  2. Pt will increase knee extension AROM by 5 degrees in order to improve pt ability to ambulate with a normalized gait pattern  -Partially Met   3. Pt will ambulate x500 feet with least restrictive device and no increase in pain in order to allow pt to navigate the aisle of the grocery store. -Partially Met (knee lory sometimes, uses cart in the store. Uses cane when she comes out of house. No AD within home.)     Long Term Goals: To be accomplished in 30  treatments.   1. Pt will increase knee flexion AROM to 105 degrees in order to improve pt ability negotiate stairs with a reciprocal gait pattern. -Partially met  2. Pt will increase knee extension ROM to 0 degrees in order to improve pt ability to ambulate with no AD. -Partially Met  3. Pt will increase R knee strength to 5/5 in order to improve pt ability to squat to pick an object off of the floor. -Met  4. Pt will negotiate x5 steps with least restrictive device and reciprocal gait pattern in order to improve pt ability to enter/exit her home without an exacerbation of symptoms. -Partially met. (uses step to gait pattern.)          PLAN  [x]  Upgrade activities as tolerated     [x]  Continue plan of care  []  Update interventions per flow sheet       []  Discharge due to:_  []  Other:_       Refugio Workman 9/10/2021

## 2021-09-13 ENCOUNTER — APPOINTMENT (OUTPATIENT)
Dept: PHYSICAL THERAPY | Age: 56
End: 2021-09-13
Payer: OTHER GOVERNMENT

## 2021-09-15 ENCOUNTER — HOSPITAL ENCOUNTER (OUTPATIENT)
Dept: PHYSICAL THERAPY | Age: 56
Discharge: HOME | End: 2021-09-15
Payer: OTHER GOVERNMENT

## 2021-09-15 PROCEDURE — 97016 VASOPNEUMATIC DEVICE THERAPY: CPT

## 2021-09-15 PROCEDURE — 97110 THERAPEUTIC EXERCISES: CPT

## 2021-09-15 NOTE — PROGRESS NOTES
PT DAILY TREATMENT NOTE 2-15    Patient Name: Jhon Glez  Date:9/15/2021  : 1965  [x]  Patient  Verified  Payor:  / Plan: [facility] / Product Type:  /    In time: 2:35 PM   Out time: 3:35  PM  Total Treatment Time (min): 60  Visit #: 12    Treatment Area: Encounter for other specified surgical aftercare [Z48.89]    SUBJECTIVE  Pain Level (0-10 scale): 0  Any medication changes, allergies to medications, adverse drug reactions, diagnosis change, or new procedure performed?: [x] No    [] Yes (see summary sheet for update)  Subjective functional status/changes:   [] No changes reported    MD gave me some steriods to help with inflammation. My knee is so painful on the inside. MD wants to do everything to prevent   Manipulation and surgery again. I had 3 surgeries on already on my knee.      OBJECTIVE    Modality rationale: decrease edema, decrease inflammation, decrease pain and increase tissue extensibility to improve the patients ability to increase knee flexion AROM to 105 degrees in order to improve pt ability negotiate stairs with a reciprocal gait pattern   Min Type Additional Details       [] Estim: []Att   []Unatt    []TENS instruct                  []IFC  []Premod   []NMES                     []Other:  []w/US   []w/ice   []w/heat  Position:  Location:       []  Traction: [] Cervical       []Lumbar                       [] Prone          []Supine                       []Intermittent   []Continuous Lbs:  [] before manual  [] after manual  []w/heat    []  Ultrasound: []Continuous   [] Pulsed                       at: []1MHz   []3MHz Location:  W/cm2:    [] Paraffin         Location:   []w/heat    []  Ice     []  Heat  []  Ice massage Position:  Location:    []  Laser  []  Other: Position:  Location:   15   [x]  Vasopneumatic Device Pressure:       [] lo [x] med [] hi   Temperature: 34 right knee     [x] Skin assessment post-treatment:  [x]intact []redness- no adverse reaction    []redness - adverse reaction:         45 min Therapeutic Exercise:  [x] See flow sheet :   Rationale: increase ROM, increase strength, improve coordination, improve balance and increase proprioception to improve the patients ability to   increase knee flexion AROM to 105 degrees in order to improve pt ability negotiate stairs with a reciprocal gait pattern      With   [] TE   [] TA   [] Neuro   [] SC   [] other: Patient Education: [x] Review HEP    [] Progressed/Changed HEP based on:   [] positioning   [] body mechanics   [] transfers   [] heat/ice application    [] other:      Pain Level (0-10 scale) post treatment: 0    ASSESSMENT/Changes in Function:   Patient continues to have limited range of motion of the right knee. Remains inflamed and swollen. Patient will benefit from mobs to decrease scar tissue as much as possible. Patient will continue to benefit from skilled PT services to address ROM deficits, address strength deficits and analyze and address soft tissue restrictions to attain remaining goals. [x]  See Plan of Care  []  See progress note/recertification  []  See Discharge Summary         Progress towards goals / Updated goals:  Updated goals:  Short Term Goals: To be accomplished in 15 treatments. 1. Pt will increase knee flexion AROM by 10 degrees in order to improve pt ability to step into her bathtub. -Partially Met (patient uses walk in shower. Has not tried the bathtub yet.)  2. Pt will increase knee extension AROM by 5 degrees in order to improve pt ability to ambulate with a normalized gait pattern  -Partially Met   3. Pt will ambulate x 500 feet with least restrictive device and no increase in pain in order to allow pt to navigate the aisle of the grocery store. Met (knee lory sometimes, uses cart in the store.  Uses cane when she comes out of house.  No AD within home.)     Long Term Goals: To be accomplished in 30  treatments.   1. Pt will increase knee flexion AROM to 105 degrees in order to improve pt ability negotiate stairs with a reciprocal gait pattern. -Partially met  2. Pt will increase knee extension ROM to 0 degrees in order to improve pt ability to ambulate with no AD. -Partially Met  3. Pt will increase R knee strength to 5/5 in order to improve pt ability to squat to pick an object off of the floor. -Met  4. Pt will negotiate x5 steps with least restrictive device and reciprocal gait pattern in order to improve pt ability to enter/exit her home without an exacerbation of symptoms. -Partially met.  (uses step to gait pattern    PLAN  []  Upgrade activities as tolerated     [x]  Continue plan of care  []  Update interventions per flow sheet       []  Discharge due to:_  []  Other:_      Yamilet Matthews, PT 9/15/2021

## 2021-09-17 ENCOUNTER — HOSPITAL ENCOUNTER (OUTPATIENT)
Dept: PHYSICAL THERAPY | Age: 56
Discharge: HOME | End: 2021-09-17
Payer: OTHER GOVERNMENT

## 2021-09-17 PROCEDURE — 97016 VASOPNEUMATIC DEVICE THERAPY: CPT

## 2021-09-17 PROCEDURE — 97110 THERAPEUTIC EXERCISES: CPT

## 2021-09-17 NOTE — PROGRESS NOTES
PT DAILY TREATMENT NOTE - [facility] 2-15    Patient Name: James Steward  Date:2021  : 1965  [x]  Patient  Verified  Payor:  / Plan: Jhonny Chaudhary 75 / Product Type:  /    In time:338 pm   Out time:440 pm   Total Treatment Time (min): 58    Visit #:  13    Treatment Area: Encounter for other specified surgical aftercare [Z48.89]    SUBJECTIVE  Pain Level (0-10 scale): 3/10  Any medication changes, allergies to medications, adverse drug reactions, diagnosis change, or new procedure performed?: [x] No    [] Yes (see summary sheet for update)  Subjective functional status/changes:   [] No changes reported  \"Pt reports still getting a burning along the incision of her knee with bending ex. At home. \"    OBJECTIVE    Modality rationale: decrease edema, decrease inflammation and decrease pain to improve the patients ability to increase functional knee motion    Min Type Additional Details       [] Estim: []Att   []Unatt    []TENS instruct                  []IFC  []Premod   []NMES                    []Other:  []w/US      []w/ heat  []w/ ice  Position:  Location:       []  Traction: [] Cervical       []Lumbar                       [] Prone          []Supine                       []Intermittent   []Continuous Lbs:  [] before manual  [] after manual  [] w/ heat  [] Simultaneously performed with w/ Estim    []  Ultrasound: []Continuous   [] Pulsed                       at: []1MHz   []3MHz Location:  W/cm2:    [] Paraffin         Location:   []w/heat    []  Ice     []  Heat  []  Ice massage Position:  Location:    []  Laser  []  Other: Position:  Location:     10 [x]  Vasopneumatic Device Pressure:       [] lo [] med [x] hi   [x] w/ ice      Temperature: 36  [] Simultaneously performed with w/ Estim     [x] Skin assessment post-treatment:  [x]intact []redness- no adverse reaction     []redness - adverse reaction:     52 min Therapeutic Exercise:  [x] See flow sheet :   Rationale: increase ROM, increase strength, improve coordination and improve balance to improve the patients ability to increase active knee motion and reduce pt guarding with amb. With   [] TE   [] TA   [] neuro   [] other: Patient Education: [x] Review HEP    [] Progressed/Changed HEP based on:   [] positioning   [] body mechanics   [] transfers   [] heat/ice application    [] other:          Pain Level (0-10 scale) post treatment: 0/10    ASSESSMENT/Changes in Function:   The pt tolerated treatment session with work on functional motion of knee increased flex to ext in active ex and cue to increase ankle motion and bringing awareness when pt is guarding against knee flex and increasing hip hike. Pt can increase knee flex when relaxed and cues. Pt notes compliance with HEP. Patient will continue to benefit from skilled PT services to modify and progress therapeutic interventions, address functional mobility deficits, address ROM deficits, address strength deficits and analyze and address soft tissue restrictions to attain remaining goals     [x]  See Plan of Care  []  See progress note/recertification  []  See Discharge Summary         Progress towards goals / Updated goals:  Short Term Goals: To be accomplished in 15 treatments. 1. Pt will increase knee flexion AROM by 10 degrees in order to improve pt ability to step into her bathtub. -Partially Met (patient uses walk in shower. Has not tried the bathtub yet.)  2. Pt will increase knee extension AROM by 5 degrees in order to improve pt ability to ambulate with a normalized gait pattern  -Partially Met   3. Pt will ambulate x 500 feet with least restrictive device and no increase in pain in order to allow pt to navigate the aisle of the grocery store. Met (knee lory sometimes, uses cart in the store.  Uses cane when she comes out of house.  No AD within home.)     Long Term Goals: To be accomplished in 30  treatments.   1. Pt will increase knee flexion AROM to 105 degrees in order to improve pt ability negotiate stairs with a reciprocal gait pattern. -Partially met  2. Pt will increase knee extension ROM to 0 degrees in order to improve pt ability to ambulate with no AD. -Partially Met  3. Pt will increase R knee strength to 5/5 in order to improve pt ability to squat to pick an object off of the floor. -Met  4. Pt will negotiate x5 steps with least restrictive device and reciprocal gait pattern in order to improve pt ability to enter/exit her home without an exacerbation of symptoms. -Partially met. (uses step to gait pattern       PLAN  [x]  Upgrade activities as tolerated     [x]  Continue plan of care  []  Update interventions per flow sheet       []  Discharge due to:_  []  Other:_      Boyd Kerns 9/17/2021

## 2021-09-20 ENCOUNTER — HOSPITAL ENCOUNTER (OUTPATIENT)
Dept: PHYSICAL THERAPY | Age: 56
Discharge: HOME | End: 2021-09-20
Payer: OTHER GOVERNMENT

## 2021-09-20 PROCEDURE — 97110 THERAPEUTIC EXERCISES: CPT

## 2021-09-20 NOTE — PROGRESS NOTES
PT DAILY TREATMENT NOTE 2-15    Patient Name: Martha Buckley  Date:2021  : 1965  [x]  Patient  Verified  Payor: SHOSHANA / Plan: Jhonny Chaudhary 75 / Product Type:  /    In time: 1:00  Out time: 1:56  Total Treatment Time (min): 56  Visit #:  14    Treatment Area: Encounter for other specified surgical aftercare [Z48.89]    SUBJECTIVE  Pain Level (0-10 scale): 0/10  Any medication changes, allergies to medications, adverse drug reactions, diagnosis change, or new procedure performed?: [x] No    [] Yes (see summary sheet for update)  Subjective functional status/changes:   [] No changes reported  Pt reports she goes back to the dr on the . States he's trying some steroids for her first before doing a manipulation. OBJECTIVE    56 min Therapeutic Exercise:  [x] See flow sheet :   Rationale: increase ROM and increase strength to improve the patients ability to improve functional mobility          With   [x] TE   [] TA   [] neuro   [] other: Patient Education: [x] Review HEP    [] Progressed/Changed HEP based on:   [] positioning   [] body mechanics   [] transfers   [] heat/ice application    [] other:      Pain Level (0-10 scale) post treatment: 0/10    ASSESSMENT/Changes in Function: Patient tolerated treatment fairly well. Pt continues to lack R knee flexion. Increased compensation on Biodex PROM with hips lifting when going into knee flexion. Also note R quad weakness on machines. Declined modalities today as she needed to get to another appt. Patient will continue to benefit from skilled PT services to address functional mobility deficits, address ROM deficits and address strength deficits to attain remaining goals. [x]  See Plan of Care  []  See progress note/recertification  []  See Discharge Summary         Progress towards goals / Updated goals:  Short Term Goals: To be accomplished in 15 treatments.   1. Pt will increase knee flexion AROM by 10 degrees in order to improve pt ability to step into her bathtub. -Partially Met (patient uses walk in shower. Has not tried the bathtub yet.)  2. Pt will increase knee extension AROM by 5 degrees in order to improve pt ability to ambulate with a normalized gait pattern  -Partially Met   3. Pt will ambulate x 500 feet with least restrictive device and no increase in pain in order to allow pt to navigate the aisle of the grocery store. Met (knee lory sometimes, uses cart in the store.  Uses cane when she comes out of house.  No AD within home.)     Long Term Goals: To be accomplished in 30  treatments. 1. Pt will increase knee flexion AROM to 105 degrees in order to improve pt ability negotiate stairs with a reciprocal gait pattern. -Partially met  2. Pt will increase knee extension ROM to 0 degrees in order to improve pt ability to ambulate with no AD. -Partially Met  3. Pt will increase R knee strength to 5/5 in order to improve pt ability to squat to pick an object off of the floor. -Met  4. Pt will negotiate x5 steps with least restrictive device and reciprocal gait pattern in order to improve pt ability to enter/exit her home without an exacerbation of symptoms. -Partially met.  (uses step to gait pattern    PLAN  [x]  Upgrade activities as tolerated     [x]  Continue plan of care  []  Update interventions per flow sheet       []  Discharge due to:_  []  Other:_      Dora Thomas PTA, LPTA 9/20/2021

## 2021-09-21 ENCOUNTER — HOSPITAL ENCOUNTER (OUTPATIENT)
Dept: PHYSICAL THERAPY | Age: 56
Discharge: HOME | End: 2021-09-21
Payer: OTHER GOVERNMENT

## 2021-09-21 PROCEDURE — 97110 THERAPEUTIC EXERCISES: CPT

## 2021-09-21 PROCEDURE — 97016 VASOPNEUMATIC DEVICE THERAPY: CPT

## 2021-09-21 NOTE — PROGRESS NOTES
PT DAILY TREATMENT NOTE 2-15    Patient Name: Juan Le  Date:2021  : 1965  [x]  Patient  Verified  Payor:  / Plan: Jhonny Chaudhary 75 / Product Type:  /    In time: 12:58  Out time: 2:12  Total Treatment Time (min): 74  Visit #:  15    Treatment Area: Encounter for other specified surgical aftercare [Z48.89]    SUBJECTIVE  Pain Level (0-10 scale): 0/10  Any medication changes, allergies to medications, adverse drug reactions, diagnosis change, or new procedure performed?: [x] No    [] Yes (see summary sheet for update)  Subjective functional status/changes:   [] No changes reported  \"I've been rubbing warm olive oil on my knee. \"    OBJECTIVE    Modality rationale: decrease edema, decrease inflammation and decrease pain to improve the patients ability to be able to perform AROM   Min Type Additional Details       [] Estim: []Att   []Unatt    []TENS instruct                  []IFC  []Premod   []NMES                    []Other:  []w/US      []w/ heat  []w/ ice  Position:  Location:       []  Traction: [] Cervical       []Lumbar                       [] Prone          []Supine                       []Intermittent   []Continuous Lbs:  [] before manual  [] after manual  [] w/ heat  [] Simultaneously performed with w/ Estim    []  Ultrasound: []Continuous   [] Pulsed                       at: []1MHz   []3MHz Location:  W/cm2:    [] Paraffin         Location:   []w/heat    []  Ice     []  Heat  []  Ice massage Position:  Location:    []  Laser  []  Other: Position:  Location:     10 [x]  Vasopneumatic Device Pressure:       [x] lo [] med [] hi   [x] w/ ice      Temperature: 34deg  [] Simultaneously performed with w/ Estim     [x] Skin assessment post-treatment:  [x]intact []redness- no adverse reaction    []redness - adverse reaction:       64 min Therapeutic Exercise:  [x] See flow sheet :   Rationale: increase ROM and increase strength to improve the patients ability to improve functional mobility         With   [x] TE   [] TA   [] neuro   [] other: Patient Education: [x] Review HEP    [] Progressed/Changed HEP based on:   [] positioning   [] body mechanics   [] transfers   [] heat/ice application    [] other:      Pain Level (0-10 scale) post treatment: 0/10    ASSESSMENT/Changes in Function: Patient tolerated treatment fairly well. No c/o pain during session. Pt still limited in R knee flexion at this time. Sees the dr next week. Patient will continue to benefit from skilled PT services to address functional mobility deficits, address ROM deficits and address strength deficits to attain remaining goals. [x]  See Plan of Care  []  See progress note/recertification  []  See Discharge Summary         Progress towards goals / Updated goals:  Short Term Goals: To be accomplished in 15 treatments. 1. Pt will increase knee flexion AROM by 10 degrees in order to improve pt ability to step into her bathtub. -Partially Met (patient uses walk in shower. Has not tried the bathtub yet.)  2. Pt will increase knee extension AROM by 5 degrees in order to improve pt ability to ambulate with a normalized gait pattern  -Partially Met   3. Pt will ambulate x 500 feet with least restrictive device and no increase in pain in order to allow pt to navigate the aisle of the grocery store. Met (knee lory sometimes, uses cart in the store.  Uses cane when she comes out of house.  No AD within home.)     Long Term Goals: To be accomplished in 30  treatments. 1. Pt will increase knee flexion AROM to 105 degrees in order to improve pt ability negotiate stairs with a reciprocal gait pattern. -Partially met  2. Pt will increase knee extension ROM to 0 degrees in order to improve pt ability to ambulate with no AD. -Partially Met  3. Pt will increase R knee strength to 5/5 in order to improve pt ability to squat to pick an object off of the floor. -Met  4. Pt will negotiate x5 steps with least restrictive device and reciprocal gait pattern in order to improve pt ability to enter/exit her home without an exacerbation of symptoms. -Partially met.  (uses step to gait pattern    PLAN  [x]  Upgrade activities as tolerated     [x]  Continue plan of care  []  Update interventions per flow sheet       []  Discharge due to:_  []  Other:_      Andreas Subramanian PTA, SABRINA 9/21/2021

## 2021-09-27 ENCOUNTER — HOSPITAL ENCOUNTER (OUTPATIENT)
Dept: PHYSICAL THERAPY | Age: 56
Discharge: HOME | End: 2021-09-27
Payer: OTHER GOVERNMENT

## 2021-09-27 PROCEDURE — 97110 THERAPEUTIC EXERCISES: CPT

## 2021-09-27 NOTE — PROGRESS NOTES
PT DAILY TREATMENT NOTE 2-15    Patient Name: Rubin Grewal  Date:2021  : 1965  [x]  Patient  Verified  Payor: SHOSHANA / Plan: Jhonny Chaudhary 74 / Product Type:  /    In time: 11:00  Out time: 11:56  Total Treatment Time (min): 56  Visit #:  16    Treatment Area: Encounter for other specified surgical aftercare [Z48.89]    SUBJECTIVE  Pain Level (0-10 scale): 0/10  Any medication changes, allergies to medications, adverse drug reactions, diagnosis change, or new procedure performed?: [x] No    [] Yes (see summary sheet for update)  Subjective functional status/changes:   [] No changes reported  \"I see the dr tomorrow. \"    OBJECTIVE    56 min Therapeutic Exercise:  [x] See flow sheet :   Rationale: increase ROM and increase strength to improve the patients ability to improve functional mobility         With   [x] TE   [] TA   [] neuro   [] other: Patient Education: [x] Review HEP    [] Progressed/Changed HEP based on:   [] positioning   [] body mechanics   [] transfers   [] heat/ice application    [] other:      Pain Level (0-10 scale) post treatment: 0/10    ASSESSMENT/Changes in Function: Patient tolerated treatment fairly well. Pt continues to be limited in R knee flexion. Pt sees  tomorrow and going to let us know what he wants to do - manipulation or not. Patient will continue to benefit from skilled PT services to address functional mobility deficits, address ROM deficits and address strength deficits to attain remaining goals. [x]  See Plan of Care  []  See progress note/recertification  []  See Discharge Summary         Progress towards goals / Updated goals:  Short Term Goals: To be accomplished in 15 treatments. 1. Pt will increase knee flexion AROM by 10 degrees in order to improve pt ability to step into her bathtub. -Partially Met (patient uses walk in shower. Has not tried the bathtub yet.)  2.  Pt will increase knee extension AROM by 5 degrees in order to improve pt ability to ambulate with a normalized gait pattern  -Partially Met   3. Pt will ambulate x 500 feet with least restrictive device and no increase in pain in order to allow pt to navigate the aisle of the grocery store. Met (knee lory sometimes, uses cart in the store.  Uses cane when she comes out of house.  No AD within home.)     Long Term Goals: To be accomplished in 30  treatments. 1. Pt will increase knee flexion AROM to 105 degrees in order to improve pt ability negotiate stairs with a reciprocal gait pattern. -Partially met  2. Pt will increase knee extension ROM to 0 degrees in order to improve pt ability to ambulate with no AD. -Partially Met  3. Pt will increase R knee strength to 5/5 in order to improve pt ability to squat to pick an object off of the floor. -Met  4. Pt will negotiate x5 steps with least restrictive device and reciprocal gait pattern in order to improve pt ability to enter/exit her home without an exacerbation of symptoms. -Partially met.  (uses step to gait pattern    PLAN  [x]  Upgrade activities as tolerated     [x]  Continue plan of care  []  Update interventions per flow sheet       []  Discharge due to:_  []  Other:_      Carloz Muller, PTA, LPTA 9/27/2021

## 2021-09-29 ENCOUNTER — HOSPITAL ENCOUNTER (OUTPATIENT)
Dept: PHYSICAL THERAPY | Age: 56
Discharge: HOME | End: 2021-09-29
Payer: OTHER GOVERNMENT

## 2021-09-29 PROCEDURE — 97110 THERAPEUTIC EXERCISES: CPT

## 2021-09-29 PROCEDURE — 97014 ELECTRIC STIMULATION THERAPY: CPT

## 2021-09-29 NOTE — PROGRESS NOTES
PT DAILY TREATMENT NOTE 2-15    Patient Name: Fanny Blanco  Date:2021  : 1965  [x]  Patient  Verified  Payor: SHOSHANA / Plan: Jhonny Chaudhary 74 / Product Type:  /    In time:300 pm  Out time:408 pm   Total Treatment Time (min): 76  Visit #:  17    Treatment Area: Encounter for other specified surgical aftercare [Z48.89]    SUBJECTIVE  Pain Level (0-10 scale): 0/10  Any medication changes, allergies to medications, adverse drug reactions, diagnosis change, or new procedure performed?: [x] No    [] Yes (see summary sheet for update)  Subjective functional status/changes:   [] No changes reported  \"Pt reports seeing the Dr and he is ok with the ext needs more bend. \"    OBJECTIVE      Modality rationale: decrease edema, decrease inflammation, decrease pain and increase tissue extensibility to improve the patients ability to increase active knee motion    Min Type Additional Details      10 [x] Estim: []Att   [x]Unatt    []TENS instruct                  []IFC  []Premod   []NMES                    [x]Other: HV []w/US      []w/ heat  [x]w/ ice  Position:seated   Location: diagonal incision site       []  Traction: [] Cervical       []Lumbar                       [] Prone          []Supine                       []Intermittent   []Continuous Lbs:  [] before manual  [] after manual  [] w/ heat  [] Simultaneously performed with w/ Estim    []  Ultrasound: []Continuous   [] Pulsed                       at: []1MHz   []3MHz Location:  W/cm2:    [] Paraffin         Location:   []w/heat    []  Ice     []  Heat  []  Ice massage Position:  Location:    []  Laser  []  Other: Position:  Location:      []  Vasopneumatic Device Pressure:       [] lo [] med [] hi   [] w/ ice      Temperature:   [] Simultaneously performed with w/ Estim     [x] Skin assessment post-treatment:  [x]intact []redness- no adverse reaction    []redness - adverse reaction:       58 min Therapeutic Exercise:  [x] See flow sheet :   Rationale: increase ROM, increase strength and improve coordination to improve the patients ability to increase functional knee flex      With   [] TE   [] TA   [] neuro   [] other: Patient Education: [x] Review HEP    [] Progressed/Changed HEP based on:   [] positioning   [] body mechanics   [] transfers   [] heat/ice application    [] other:          Pain Level (0-10 scale) post treatment: 0/10    ASSESSMENT/Changes in Function:   Patient tolerated treatment session with increase focus on knee bend. Increase pressure and hold time on biodex PROM increase eccentric quad ex post stretching. And changed estim to HV for soft tissue, ROM protocol with CP. Patient will continue to benefit from skilled PT services to modify and progress therapeutic interventions, address functional mobility deficits, address ROM deficits, address strength deficits and analyze and address soft tissue restrictions to attain remaining goals. [x]  See Plan of Care  []  See progress note/recertification  []  See Discharge Summary         Progress towards goals / Updated goals:  Short Term Goals: To be accomplished in 15 treatments. 1. Pt will increase knee flexion AROM by 10 degrees in order to improve pt ability to step into her bathtub. -Partially Met (patient uses walk in shower. Has not tried the bathtub yet.)  2. Pt will increase knee extension AROM by 5 degrees in order to improve pt ability to ambulate with a normalized gait pattern  -Partially Met   3. Pt will ambulate x 500 feet with least restrictive device and no increase in pain in order to allow pt to navigate the aisle of the grocery store. Met (knee lory sometimes, uses cart in the store.  Uses cane when she comes out of house.  No AD within home.)     Long Term Goals: To be accomplished in 30  treatments. 1. Pt will increase knee flexion AROM to 105 degrees in order to improve pt ability negotiate stairs with a reciprocal gait pattern. -Partially met  2.  Pt will increase knee extension ROM to 0 degrees in order to improve pt ability to ambulate with no AD. -Partially Met  3. Pt will increase R knee strength to 5/5 in order to improve pt ability to squat to pick an object off of the floor. -Met  4. Pt will negotiate x5 steps with least restrictive device and reciprocal gait pattern in order to improve pt ability to enter/exit her home without an exacerbation of symptoms. -Partially met. (uses step to gait pattern       PLAN  [x]  Upgrade activities as tolerated     [x]  Continue plan of care  []  Update interventions per flow sheet       []  Discharge due to:_  []  Other:_      Annalee Marinelli 9/29/2021

## 2021-10-01 ENCOUNTER — HOSPITAL ENCOUNTER (OUTPATIENT)
Dept: PHYSICAL THERAPY | Age: 56
Discharge: HOME | End: 2021-10-01
Payer: OTHER GOVERNMENT

## 2021-10-01 PROCEDURE — 97014 ELECTRIC STIMULATION THERAPY: CPT

## 2021-10-01 PROCEDURE — 97110 THERAPEUTIC EXERCISES: CPT

## 2021-10-01 NOTE — PROGRESS NOTES
PT DAILY TREATMENT NOTE 2-15    Patient Name: Ralph Umanzor  Date:10/1/2021  : 1965  [x]  Patient  Verified  Payor:  / Plan: [facility]  [facility] REGION / Product Type:  /    In time:241 pm  Out time:348 pm  Total Treatment Time (min): 79  Visit #:  18    Treatment Area: Encounter for other specified surgical aftercare [Z48.89]    SUBJECTIVE  Pain Level (0-10 scale): 4/10  Any medication changes, allergies to medications, adverse drug reactions, diagnosis change, or new procedure performed?: [x] No    [] Yes (see summary sheet for update)  Subjective functional status/changes:   [] No changes reported  \"Pt report some increased soreness today since she has been out shopping and walking. Opal Pulliam \"    OBJECTIVE      Modality rationale: decrease edema, decrease inflammation, decrease pain and increase tissue extensibility to improve the patients ability to increase knee motion    Min Type Additional Details      12 [x] Estim: []Att   [x]Unatt    []TENS instruct                  []IFC  []Premod   []NMES                    [x]Other: HV []w/US      []w/ heat  [x]w/ ice  Position: seated  Location: Knee incison       []  Traction: [] Cervical       []Lumbar                       [] Prone          []Supine                       []Intermittent   []Continuous Lbs:  [] before manual  [] after manual  [] w/ heat  [] Simultaneously performed with w/ Estim    []  Ultrasound: []Continuous   [] Pulsed                       at: []1MHz   []3MHz Location:  W/cm2:    [] Paraffin         Location:   []w/heat    []  Ice     []  Heat  []  Ice massage Position:  Location:    []  Laser  []  Other: Position:  Location:      []  Vasopneumatic Device Pressure:       [] lo [] med [] hi   [] w/ ice      Temperature:   [] Simultaneously performed with w/ Estim     [x] Skin assessment post-treatment:  [x]intact []redness- no adverse reaction    []redness - adverse reaction:       55 min Therapeutic Exercise:  [x] See flow sheet :   Rationale: increase ROM, increase strength and improve coordination to improve the patients ability to increase knee motion             With   [] TE   [] TA   [] neuro   [] other: Patient Education: [x] Review HEP    [] Progressed/Changed HEP based on:   [] positioning   [] body mechanics   [] transfers   [] heat/ice application    [] other:        Pain Level (0-10 scale) post treatment: 2/10    ASSESSMENT/Changes in Function:   Patient tolerated treatment session with work on Knee flex to increase active motion. Pt did see some changes after HV use last visit and willing to try again. Pt notes compliance with HEP and continues to focus on knee flex as well. Patient will continue to benefit from skilled PT services to modify and progress therapeutic interventions, address functional mobility deficits, address ROM deficits, address strength deficits and analyze and cue movement patterns to attain remaining goals. [x]  See Plan of Care  []  See progress note/recertification  []  See Discharge Summary         Progress towards goals / Updated goals:  Short Term Goals: To be accomplished in 15 treatments. 1. Pt will increase knee flexion AROM by 10 degrees in order to improve pt ability to step into her bathtub. -Partially Met (patient uses walk in shower. Has not tried the bathtub yet.)  2. Pt will increase knee extension AROM by 5 degrees in order to improve pt ability to ambulate with a normalized gait pattern  -Partially Met   3. Pt will ambulate x 500 feet with least restrictive device and no increase in pain in order to allow pt to navigate the aisle of the grocery store. Met (knee lory sometimes, uses cart in the store.  Uses cane when she comes out of house.  No AD within home.)     Long Term Goals: To be accomplished in 30  treatments. 1. Pt will increase knee flexion AROM to 105 degrees in order to improve pt ability negotiate stairs with a reciprocal gait pattern. -Partially met  2.  Pt will increase knee extension ROM to 0 degrees in order to improve pt ability to ambulate with no AD. -Partially Met  3. Pt will increase R knee strength to 5/5 in order to improve pt ability to squat to pick an object off of the floor. -Met  4. Pt will negotiate x5 steps with least restrictive device and reciprocal gait pattern in order to improve pt ability to enter/exit her home without an exacerbation of symptoms. -Partially met. (uses step to gait pattern       PLAN  [x]  Upgrade activities as tolerated     [x]  Continue plan of care  []  Update interventions per flow sheet       []  Discharge due to:_  []  Other:_      Charo Trujillo [facility] 10/1/2021

## 2021-10-04 ENCOUNTER — HOSPITAL ENCOUNTER (OUTPATIENT)
Dept: PHYSICAL THERAPY | Age: 56
Discharge: HOME | End: 2021-10-04
Payer: OTHER GOVERNMENT

## 2021-10-04 PROCEDURE — 97014 ELECTRIC STIMULATION THERAPY: CPT

## 2021-10-04 PROCEDURE — 97110 THERAPEUTIC EXERCISES: CPT

## 2021-10-04 NOTE — PROGRESS NOTES
PT DAILY TREATMENT NOTE 2-15    Patient Name: Kaitlynn Mensah  Date:10/4/2021  : 1965  [x]  Patient  Verified  Payor:  / Plan: [facility] REGION / Product Type:  /    In time:234 pm  Out time:347 pm  Total Treatment Time (min): 68  Visit #:  19    Treatment Area: Encounter for other specified surgical aftercare [Z48.89]    SUBJECTIVE  Pain Level (0-10 scale): 0/10  Any medication changes, allergies to medications, adverse drug reactions, diagnosis change, or new procedure performed?: [x] No    [] Yes (see summary sheet for update)  Subjective functional status/changes:   [] No changes reported  \"Pt reports no new c/o she is just trying to get her knee to move so she does not have to get manipulation . \"    OBJECTIVE      Modality rationale: decrease edema, decrease inflammation, decrease pain and increase tissue extensibility to improve the patients ability to increase active knee flex   Min Type Additional Details      12 [x] Estim: []Att   [x]Unatt    []TENS instruct                  []IFC  []Premod   []NMES                    [x]Other: HV []w/US      []w/ heat  [x]w/ ice  Position: seated   Location:kacie cross incision line       []  Traction: [] Cervical       []Lumbar                       [] Prone          []Supine                       []Intermittent   []Continuous Lbs:  [] before manual  [] after manual  [] w/ heat  [] Simultaneously performed with w/ Estim    []  Ultrasound: []Continuous   [] Pulsed                       at: []1MHz   []3MHz Location:  W/cm2:    [] Paraffin         Location:   []w/heat    []  Ice     []  Heat  []  Ice massage Position:  Location:    []  Laser  []  Other: Position:  Location:      []  Vasopneumatic Device Pressure:       [] lo [] med [] hi   [] w/ ice      Temperature:   [] Simultaneously performed with w/ Estim     [x] Skin assessment post-treatment:  [x]intact []redness- no adverse reaction    []redness - adverse reaction:       61 min Therapeutic Exercise:  [x] See flow sheet :   Rationale: increase ROM, increase strength and improve coordination to improve the patients ability to increase functional knee motion and safety with gait. With   [] TE   [] TA   [] neuro   [] other: Patient Education: [x] Review HEP    [] Progressed/Changed HEP based on:   [] positioning   [] body mechanics   [] transfers   [] heat/ice application    [] other:           Pain Level (0-10 scale) post treatment: 0/10    ASSESSMENT/Changes in Function:   Patient tolerated treatment session with continued work on pushing her knee flex. Pt still having tightness in incision at proximal end and across distal patella portion. Pt notes continued compliance at home as much as she could. Patient will continue to benefit from skilled PT services to modify and progress therapeutic interventions, address functional mobility deficits, address ROM deficits, address strength deficits, analyze and address soft tissue restrictions and analyze and cue movement patterns to attain remaining goals. [x]  See Plan of Care  []  See progress note/recertification  []  See Discharge Summary         Progress towards goals / Updated goals:  Short Term Goals: To be accomplished in 15 treatments. 1. Pt will increase knee flexion AROM by 10 degrees in order to improve pt ability to step into her bathtub. -Partially Met (patient uses walk in shower. Has not tried the bathtub yet.)  2. Pt will increase knee extension AROM by 5 degrees in order to improve pt ability to ambulate with a normalized gait pattern  -Partially Met   3. Pt will ambulate x 500 feet with least restrictive device and no increase in pain in order to allow pt to navigate the aisle of the grocery store. Met (knee lory sometimes, uses cart in the store.  Uses cane when she comes out of house.  No AD within home.)     Long Term Goals: To be accomplished in 30  treatments.   1. Pt will increase knee flexion AROM to 105 degrees in order to improve pt ability negotiate stairs with a reciprocal gait pattern. -Partially met  2. Pt will increase knee extension ROM to 0 degrees in order to improve pt ability to ambulate with no AD. -Partially Met  3. Pt will increase R knee strength to 5/5 in order to improve pt ability to squat to pick an object off of the floor. -Met  4. Pt will negotiate x5 steps with least restrictive device and reciprocal gait pattern in order to improve pt ability to enter/exit her home without an exacerbation of symptoms. -Partially met. (uses step to gait pattern          PLAN  [x]  Upgrade activities as tolerated     [x]  Continue plan of care  []  Update interventions per flow sheet       []  Discharge due to:_  []  Other:_      Teania L. Lara Bloch, Rometta Brock 10/4/2021

## 2021-10-06 ENCOUNTER — HOSPITAL ENCOUNTER (OUTPATIENT)
Dept: PHYSICAL THERAPY | Age: 56
Discharge: HOME | End: 2021-10-06
Payer: OTHER GOVERNMENT

## 2021-10-06 PROCEDURE — 97014 ELECTRIC STIMULATION THERAPY: CPT

## 2021-10-06 PROCEDURE — 97110 THERAPEUTIC EXERCISES: CPT

## 2021-10-06 NOTE — PROGRESS NOTES
PT DAILY TREATMENT NOTE 2-15    Patient Name: Fanny Blanco  Date:10/6/2021  : 1965  [x]  Patient  Verified  Payor:  / Plan: [facility] REGION / Product Type:  /    In time:234 pm   Out time:340 pm   Total Treatment Time (min): 77  Visit #:  20    Treatment Area: Encounter for other specified surgical aftercare [Z48.89]    SUBJECTIVE  Pain Level (0-10 scale): 0/10  Any medication changes, allergies to medications, adverse drug reactions, diagnosis change, or new procedure performed?: [x] No    [] Yes (see summary sheet for update)  Subjective functional status/changes:   [] No changes reported  \"Pt report some increase soreness along medial aspect of knee. \"    OBJECTIVE      Modality rationale: decrease edema, decrease inflammation, decrease pain and increase tissue extensibility to improve the patients ability to increase knee motion    Min Type Additional Details      12 [x] Estim: []Att   [x]Unatt    []TENS instruct                  []IFC  []Premod   []NMES                    [x]Other: HV []w/US      []w/ heat  [x]w/ ice  Position: seated   Location: knee        []  Traction: [] Cervical       []Lumbar                       [] Prone          []Supine                       []Intermittent   []Continuous Lbs:  [] before manual  [] after manual  [] w/ heat  [] Simultaneously performed with w/ Estim    []  Ultrasound: []Continuous   [] Pulsed                       at: []1MHz   []3MHz Location:  W/cm2:    [] Paraffin         Location:   []w/heat   12 [x]  Ice     []  Heat  []  Ice massage Position: seated   Location: knee    []  Laser  []  Other: Position:  Location:      []  Vasopneumatic Device Pressure:       [] lo [] med [] hi   [] w/ ice      Temperature:   [] Simultaneously performed with w/ Estim     [x] Skin assessment post-treatment:  [x]intact []redness- no adverse reaction    []redness - adverse reaction:       54 min Therapeutic Exercise:  [x] See flow sheet : Rationale: increase ROM, increase strength and improve coordination to improve the patients ability to increase active knee motion with emphasis on knee flex. With   [] TE   [] TA   [] neuro   [] other: Patient Education: [x] Review HEP    [] Progressed/Changed HEP based on:   [] positioning   [] body mechanics   [] transfers   [] heat/ice application    [] other:           Pain Level (0-10 scale) post treatment: 0/10    ASSESSMENT/Changes in Function:   Patient tolerated treatment session with continued work on knee flex , patella mob and scar mob to increase active knee flex and ext to perform daily tasks and community level activities. Pt notes compliance with HEP. And to see  on Fri for recheck. Patient will continue to benefit from skilled PT services to modify and progress therapeutic interventions, address functional mobility deficits, address ROM deficits, address strength deficits, analyze and address soft tissue restrictions and analyze and cue movement patterns to attain remaining goals. [x]  See Plan of Care  []  See progress note/recertification  []  See Discharge Summary         Progress towards goals / Updated goals:  Short Term Goals: To be accomplished in 15 treatments. 1. Pt will increase knee flexion AROM by 10 degrees in order to improve pt ability to step into her bathtub. -Partially Met (patient uses walk in shower. Has not tried the bathtub yet.)  2. Pt will increase knee extension AROM by 5 degrees in order to improve pt ability to ambulate with a normalized gait pattern  -Partially Met   3. Pt will ambulate x 500 feet with least restrictive device and no increase in pain in order to allow pt to navigate the aisle of the grocery store. Met (knee lory sometimes, uses cart in the store.  Uses cane when she comes out of house.  No AD within home.)     Long Term Goals: To be accomplished in 30  treatments.   1. Pt will increase knee flexion AROM to 105 degrees in order to improve pt ability negotiate stairs with a reciprocal gait pattern. -Partially met  2. Pt will increase knee extension ROM to 0 degrees in order to improve pt ability to ambulate with no AD. -Partially Met  3. Pt will increase R knee strength to 5/5 in order to improve pt ability to squat to pick an object off of the floor. -Met  4. Pt will negotiate x5 steps with least restrictive device and reciprocal gait pattern in order to improve pt ability to enter/exit her home without an exacerbation of symptoms. -Partially met. (uses step to gait pattern       PLAN  [x]  Upgrade activities as tolerated     [x]  Continue plan of care  []  Update interventions per flow sheet       []  Discharge due to:_  []  Other:_      Salena Miranda 10/6/2021

## 2021-10-07 ENCOUNTER — HOSPITAL ENCOUNTER (OUTPATIENT)
Dept: PHYSICAL THERAPY | Age: 56
Discharge: HOME | End: 2021-10-07
Payer: OTHER GOVERNMENT

## 2021-10-07 PROCEDURE — 97110 THERAPEUTIC EXERCISES: CPT

## 2021-10-07 NOTE — PROGRESS NOTES
PT DAILY TREATMENT NOTE 2-15    Patient Name: Sussy Quiñones  Date:10/7/2021  : 1965  [x]  Patient  Verified  Payor: SHOSHANA / Plan: [facility] / Product Type:  /    In time:1305  Out time:1335  Total Treatment Time (min): 35  Visit #:  21    Treatment Area: Encounter for other specified surgical aftercare [Z48.89]    SUBJECTIVE  Pain Level (0-10 scale): 0/10  Any medication changes, allergies to medications, adverse drug reactions, diagnosis change, or new procedure performed?: [x] No    [] Yes (see summary sheet for update)  Subjective functional status/changes:   [] No changes reported  \"I can do everything I want to do except squat because of the tightness in the front of my knee. I really don't want to have another surgery though. \"    OBJECTIVE        15 min Therapeutic Exercise:  [x] See flow sheet :   Rationale: increase ROM and increase strength to improve the patients ability to perform functional  Mobility without pain           With   [x] TE   [] TA   [] neuro   [] other: Patient Education: [x] Review HEP    [] Progressed/Changed HEP based on:   [] positioning   [] body mechanics   [] transfers   [] heat/ice application    [] other:      Other Objective/Functional Measures:       Hip:   Strength AROM PROM       Right Left             Flexion 5/5 5/5           Knee:   Strength AROM PROM       Right Left Right Left Right Left     Flexion 5/5 5/5 80   90       Extension 5/5 5/5 -3   0     Ankle   Strength AROM PROM       Right Left Right Left Right Left     Dorsiflexion 5/5 5/5             Plantarflexion 5/5 5/5                 Pain Level (0-10 scale) post treatment: 0/10    ASSESSMENT/Changes in Function:   Patient tolerated treatment well and has progressed quite a bit since her last progress note. She is seeing her surgeon tomorrow to determine if she needs a surgical manipulation.   If she does not need a surgical manipulation, then she will likely benefit from continued skilled physical therapy services in order to address her continued deficits which include decreased ROM, and increased pain and tenderness to palpation around the surgical site so that she can return to at or near her prior level of function. Patient will continue to benefit from skilled PT services to modify and progress therapeutic interventions, address functional mobility deficits, address ROM deficits, analyze and address soft tissue restrictions, analyze and cue movement patterns and address imbalance/dizziness to attain remaining goals. []  See Plan of Care  [x]  See progress note/recertification  []  See Discharge Summary         Progress towards goals / Updated goals:  Short Term Goals: To be accomplished in 15 treatments. 1. Pt will increase knee flexion AROM by 10 degrees in order to improve pt ability to step into her bathtub. - Met (patient uses walk in shower. Has not tried the bathtub yet.)  2. Pt will increase knee extension AROM by 5 degrees in order to improve pt ability to ambulate with a normalized gait pattern  -Met   3. Pt will ambulate x 500 feet with least restrictive device and no increase in pain in order to allow pt to navigate the aisle of the grocery store. Met (knee lory sometimes, uses cart in the store.  Uses cane when she comes out of house.  No AD within home.)     Long Term Goals: To be accomplished in 30  treatments. 1. Pt will increase knee flexion AROM to 105 degrees in order to improve pt ability negotiate stairs with a reciprocal gait pattern. - Partially Met  2. Pt will increase knee extension ROM to 0 degrees in order to improve pt ability to ambulate with no AD. -Partially Met  3. Pt will increase R knee strength to 5/5 in order to improve pt ability to squat to pick an object off of the floor. -Met  4. Pt will negotiate x5 steps with least restrictive device and reciprocal gait pattern in order to improve pt ability to enter/exit her home without an exacerbation of symptoms. -Partially met.  (uses step to gait pattern    PLAN  [x]  Upgrade activities as tolerated     [x]  Continue plan of care  []  Update interventions per flow sheet       []  Discharge due to:_  []  Other:_      Sunday Darron PT, DPT 10/7/2021

## 2021-10-07 NOTE — PROGRESS NOTES
[address]  [place], [place]  Ph: [phone]    Fax: [phone]    Progress Note    Name: Saida Ramon   : 1965   MD: Gilma Musa MD       Treatment Diagnosis: Encounter for other specified surgical aftercare [Z48.89]  Start of Care: 8/10/2021    Visits from Start of Care: 21   Missed Visits: 1    Summary of Care / Assessment / Recommendations:   Patient tolerated treatment well and has progressed quite a bit since her last progress note. She is seeing her surgeon tomorrow to determine if she needs a surgical manipulation. If she does not need a surgical manipulation, then she will likely benefit from continued skilled physical therapy services in order to address her continued deficits which include decreased ROM, and increased pain and tenderness to palpation around the surgical site so that she can return to at or near her prior level of function. Progress Toward Goals:  Short Term Goals: To be accomplished in 15 treatments. 1. Pt will increase knee flexion AROM by 10 degrees in order to improve pt ability to step into her bathtub. - Met (patient uses walk in shower. Has not tried the bathtub yet.)  2. Pt will increase knee extension AROM by 5 degrees in order to improve pt ability to ambulate with a normalized gait pattern  -Met   3. Pt will ambulate x 500 feet with least restrictive device and no increase in pain in order to allow pt to navigate the aisle of the grocery store. Met (knee lory sometimes, uses cart in the store.  Uses cane when she comes out of house.  No AD within home.)     Long Term Goals: To be accomplished in 30  treatments. 1. Pt will increase knee flexion AROM to 105 degrees in order to improve pt ability negotiate stairs with a reciprocal gait pattern. - Partially Met  2. Pt will increase knee extension ROM to 0 degrees in order to improve pt ability to ambulate with no AD. -Partially Met  3.  Pt will increase R knee strength to 5/5 in order to improve pt ability to squat to pick an object off of the floor. -Met  4. Pt will negotiate x5 steps with least restrictive device and reciprocal gait pattern in order to improve pt ability to enter/exit her home without an exacerbation of symptoms. -Partially met. (uses step to gait pattern    Frequency/Duration:  2 treatments per week, for 10 treatments. Mickie Harvey, PT, DPT 10/7/2021         ________________________________________________________________________  NOTE TO PHYSICIAN:  Please complete the following and fax to:  [facility]:   Fax: [phone]  . Retain this original for your records. If you are unable to process this request in 24 hours, please contact our office.        ____ I have read the above report and request that my patient continue therapy with the following changes/special instructions:  ____ I have read the above report and request that my patient be discharged from therapy    Physician's Signature:_________________ Date:___________Time:__________

## 2021-10-08 ENCOUNTER — APPOINTMENT (OUTPATIENT)
Dept: PHYSICAL THERAPY | Age: 56
End: 2021-10-08
Payer: OTHER GOVERNMENT

## 2021-10-12 ENCOUNTER — HOSPITAL ENCOUNTER (OUTPATIENT)
Dept: PHYSICAL THERAPY | Age: 56
Discharge: HOME | End: 2021-10-12
Payer: OTHER GOVERNMENT

## 2021-10-12 PROCEDURE — 97110 THERAPEUTIC EXERCISES: CPT

## 2021-10-12 NOTE — PROGRESS NOTES
PT DAILY TREATMENT NOTE 2-15    Patient Name: Rubin Grewal  Date:10/12/2021  : 1965  [x]  Patient  Verified  Payor: SHOSHANA / Plan: J[facility] Chaudhary 75 / Product Type:  /    In time: 10:00 AM  Out time:11:00 AM  Total Treatment Time (min): 60  Visit #:  22    Treatment Area: Encounter for other specified surgical aftercare [Z48.89]    SUBJECTIVE  Pain Level (0-10 scale): 0/10  Any medication changes, allergies to medications, adverse drug reactions, diagnosis change, or new procedure performed?: [x] No    [] Yes (see summary sheet for update)    Subjective functional status/changes:   [] No changes reported    I do not have to get the manipulation. MD sent me back to therapy for another 4 weeks to improve my bending. Experiencing no pain. OBJECTIVE    60 min Therapeutic Exercise:  [x] See flow sheet :   Rationale: increase ROM, increase strength, improve coordination, improve balance and increase proprioception   to improve the patients ability to knee flexion AROM to 105 degrees in order to improve pt ability negotiate stairs with a reciprocal gait pattern. Other Objective/Functional Measures: ROM exercises and strengthening exercises for right knee replacement   ROM: 93 degree on Biodex  Isokinetic on Biodex: Speed 120      Pain Level (0-10 scale) post treatment: 0/10    ASSESSMENT/Changes in Function:   Patient tolerated treatment with ROM exercises. Continue to have difficulty with getting beyond 90 degrees without lifting her hip. Increased weight resistance and included Isokinetic activities on the Biodex. Patient will continue to benefit from skilled PT services to address ROM deficits, address strength deficits and analyze and address soft tissue restrictions to attain remaining goals.      [x]  See Plan of Care  []  See progress note/recertification  []  See Discharge Summary         Progress towards goals / Updated goals:  Short Term Goals: To be accomplished in 15 treatments. 1. Pt will increase knee flexion AROM by 10 degrees in order to improve pt ability to step into her bathtub. - Met (patient uses walk in shower. Has not tried the bathtub yet.)  2. Pt will increase knee extension AROM by 5 degrees in order to improve pt ability to ambulate with a normalized gait pattern  -Met   3. Pt will ambulate x 500 feet with least restrictive device and no increase in pain in order to allow pt to navigate the aisle of the grocery store. Met (knee lory sometimes, uses cart in the store.  Uses cane when she comes out of house.  No AD within home.)     Long Term Goals: To be accomplished in 30  treatments. 1. Pt will increase knee flexion AROM to 105 degrees in order to improve pt ability negotiate stairs with a reciprocal gait pattern. - Partially Met  2. Pt will increase knee extension ROM to 0 degrees in order to improve pt ability to ambulate with no AD. -Partially Met  3. Pt will increase R knee strength to 5/5 in order to improve pt ability to squat to pick an object off of the floor. -Met  4. Pt will negotiate x5 steps with least restrictive device and reciprocal gait pattern in order to improve pt ability to enter/exit her home without an exacerbation of symptoms. -Partially met.  (uses step to gait pattern    PLAN  [x]  Upgrade activities as tolerated     [x]  Continue plan of care  []  Update interventions per flow sheet       []  Discharge due to:_  []  Other:_      Kary Polanco, PT,  10/12/2021

## 2021-10-14 ENCOUNTER — HOSPITAL ENCOUNTER (OUTPATIENT)
Dept: PHYSICAL THERAPY | Age: 56
Discharge: HOME | End: 2021-10-14
Payer: OTHER GOVERNMENT

## 2021-10-14 PROCEDURE — 97110 THERAPEUTIC EXERCISES: CPT

## 2021-10-14 NOTE — PROGRESS NOTES
PT DAILY TREATMENT NOTE 2-15    Patient Name: Laury Gold  Date:10/14/2021  : 1965  [x]  Patient  Verified  Payor:  / Plan: [facility]  [facility] / Product Type:  /    In time: 1:06  Out time: 2:07  Total Treatment Time (min): 61  Visit #:  23    Treatment Area: Encounter for other specified surgical aftercare [Z48.89]    SUBJECTIVE  Pain Level (0-10 scale): 0/10  Any medication changes, allergies to medications, adverse drug reactions, diagnosis change, or new procedure performed?: [x] No    [] Yes (see summary sheet for update)  Subjective functional status/changes:   [x] No changes reported    OBJECTIVE    61 min Therapeutic Exercise:  [x] See flow sheet :   Rationale: increase ROM and increase strength to improve the patients ability to improve functional mobility         With   [x] TE   [] TA   [] neuro   [] other: Patient Education: [x] Review HEP    [] Progressed/Changed HEP based on:   [] positioning   [] body mechanics   [] transfers   [] heat/ice application    [] other:      Other Objective/Functional Measures: Added lunges     Pain Level (0-10 scale) post treatment: 0/10    ASSESSMENT/Changes in Function: Patient tolerated treatment fairly well. Hold time on Biodex flexion for 30 seconds. Pt tends to still lift her bottom with knee flexion on Biodex, so no improvement in numbers today. Added lunges with assist of step to increase strengthening. Patient will continue to benefit from skilled PT services to address functional mobility deficits, address ROM deficits and address strength deficits to attain remaining goals. [x]  See Plan of Care  []  See progress note/recertification  []  See Discharge Summary         Progress towards goals / Updated goals:  Short Term Goals: To be accomplished in 15 treatments. 1. Pt will increase knee flexion AROM by 10 degrees in order to improve pt ability to step into her bathtub. - Met (patient uses walk in shower.  Has not tried the bathtub yet.)  2. Pt will increase knee extension AROM by 5 degrees in order to improve pt ability to ambulate with a normalized gait pattern  -Met   3. Pt will ambulate x 500 feet with least restrictive device and no increase in pain in order to allow pt to navigate the aisle of the grocery store. Met (knee lory sometimes, uses cart in the store.  Uses cane when she comes out of house.  No AD within home.)     Long Term Goals: To be accomplished in 30  treatments. 1. Pt will increase knee flexion AROM to 105 degrees in order to improve pt ability negotiate stairs with a reciprocal gait pattern. - Partially Met  2. Pt will increase knee extension ROM to 0 degrees in order to improve pt ability to ambulate with no AD. -Partially Met  3. Pt will increase R knee strength to 5/5 in order to improve pt ability to squat to pick an object off of the floor. -Met  4. Pt will negotiate x5 steps with least restrictive device and reciprocal gait pattern in order to improve pt ability to enter/exit her home without an exacerbation of symptoms. -Partially met.  (uses step to gait pattern    PLAN  [x]  Upgrade activities as tolerated     [x]  Continue plan of care  []  Update interventions per flow sheet       []  Discharge due to:_  []  Other:_      Diego Hernandez PTA, LPTA 10/14/2021

## 2021-10-19 ENCOUNTER — HOSPITAL ENCOUNTER (OUTPATIENT)
Dept: PHYSICAL THERAPY | Age: 56
Discharge: HOME | End: 2021-10-19
Payer: OTHER GOVERNMENT

## 2021-10-19 PROCEDURE — 97110 THERAPEUTIC EXERCISES: CPT

## 2021-10-19 PROCEDURE — 97016 VASOPNEUMATIC DEVICE THERAPY: CPT

## 2021-10-19 NOTE — PROGRESS NOTES
PT DAILY TREATMENT NOTE 2-15    Patient Name: Rubin Grewal  Date:10/19/2021  : 1965  [x]  Patient  Verified  Payor: SHOSHANA / Plan: [facility]honny Chaudhary 75 / Product Type:  /    In time: 7:33  Out time: 8:44  Total Treatment Time (min): 71  Visit #:  24    Treatment Area: Encounter for other specified surgical aftercare [Z48.89]    SUBJECTIVE  Pain Level (0-10 scale): 0/10  Any medication changes, allergies to medications, adverse drug reactions, diagnosis change, or new procedure performed?: [x] No    [] Yes (see summary sheet for update)  Subjective functional status/changes:   [] No changes reported  Pt states she went back to work yesterday and did fine.     OBJECTIVE    Modality rationale: decrease edema, decrease inflammation and decrease pain to improve the patients ability to be able to perform AROM   Min Type Additional Details       [] Estim: []Att   []Unatt    []TENS instruct                  []IFC  []Premod   []NMES                    []Other:  []w/US      []w/ heat  []w/ ice  Position:  Location:       []  Traction: [] Cervical       []Lumbar                       [] Prone          []Supine                       []Intermittent   []Continuous Lbs:  [] before manual  [] after manual  [] w/ heat  [] Simultaneously performed with w/ Estim    []  Ultrasound: []Continuous   [] Pulsed                       at: []1MHz   []3MHz Location:  W/cm2:    [] Paraffin         Location:   []w/heat    []  Ice     []  Heat  []  Ice massage Position:  Location:    []  Laser  []  Other: Position:  Location:     10 [x]  Vasopneumatic Device Pressure:       [x] lo [] med [] hi   [x] w/ ice      Temperature: 34deg  [] Simultaneously performed with w/ Estim     [x] Skin assessment post-treatment:  [x]intact []redness- no adverse reaction    []redness - adverse reaction:       61 min Therapeutic Exercise:  [x] See flow sheet :   Rationale: increase ROM and increase strength to improve the patients ability to improve functional mobility         With   [] TE   [] TA   [] neuro   [] other: Patient Education: [x] Review HEP    [] Progressed/Changed HEP based on:   [] positioning   [] body mechanics   [] transfers   [] heat/ice application    [] other:      Pain Level (0-10 scale) post treatment: 0/10    ASSESSMENT/Changes in Function: Patient tolerated treatment fairly well. Pt continues to be limited in R knee flexion when descending stairs. Able to increased numbers in flexion and extension today on Biodex PROM, but lap belt still needed to prevent pt from lifting hip when knee is flexed. Patient will continue to benefit from skilled PT services to address functional mobility deficits, address ROM deficits and address strength deficits to attain remaining goals. [x]  See Plan of Care  []  See progress note/recertification  []  See Discharge Summary         Progress towards goals / Updated goals:  Short Term Goals: To be accomplished in 15 treatments. 1. Pt will increase knee flexion AROM by 10 degrees in order to improve pt ability to step into her bathtub. - Met (patient uses walk in shower. Has not tried the bathtub yet.)  2. Pt will increase knee extension AROM by 5 degrees in order to improve pt ability to ambulate with a normalized gait pattern  -Met   3. Pt will ambulate x 500 feet with least restrictive device and no increase in pain in order to allow pt to navigate the aisle of the grocery store. Met (knee lory sometimes, uses cart in the store.  Uses cane when she comes out of house.  No AD within home.)     Long Term Goals: To be accomplished in 30  treatments. 1. Pt will increase knee flexion AROM to 105 degrees in order to improve pt ability negotiate stairs with a reciprocal gait pattern. - Partially Met  2. Pt will increase knee extension ROM to 0 degrees in order to improve pt ability to ambulate with no AD. -Partially Met  3.  Pt will increase R knee strength to 5/5 in order to improve pt ability to squat to pick an object off of the floor. -Met  4. Pt will negotiate x5 steps with least restrictive device and reciprocal gait pattern in order to improve pt ability to enter/exit her home without an exacerbation of symptoms. -Partially met.  (uses step to gait pattern    PLAN  [x]  Upgrade activities as tolerated     [x]  Continue plan of care  []  Update interventions per flow sheet       []  Discharge due to:_  []  Other:_      Cory Lopez, PAM, SABRINA 10/19/2021

## 2021-10-21 ENCOUNTER — HOSPITAL ENCOUNTER (OUTPATIENT)
Dept: PHYSICAL THERAPY | Age: 56
Discharge: HOME | End: 2021-10-21
Payer: OTHER GOVERNMENT

## 2021-10-21 PROCEDURE — 97110 THERAPEUTIC EXERCISES: CPT

## 2021-10-21 NOTE — PROGRESS NOTES
PT DAILY TREATMENT NOTE 2-15    Patient Name: Balta Wells  Date:10/21/2021  : 1965  [x]  Patient  Verified  Payor:  / Plan: [facility] REGION / Product Type:  /    In time: 7:59  Out time: 9:13  Total Treatment Time (min): 74  Visit #:  25    Treatment Area: Encounter for other specified surgical aftercare [Z48.89]    SUBJECTIVE  Pain Level (0-10 scale): 0/10  Any medication changes, allergies to medications, adverse drug reactions, diagnosis change, or new procedure performed?: [x] No    [] Yes (see summary sheet for update)  Subjective functional status/changes:   [x] No changes reported    OBJECTIVE    Modality rationale: decrease edema, decrease inflammation and decrease pain to improve the patients ability to be able to perform AROM   Min Type Additional Details       [] Estim: []Att   []Unatt    []TENS instruct                  []IFC  []Premod   []NMES                    []Other:  []w/US      []w/ heat  []w/ ice  Position:  Location:       []  Traction: [] Cervical       []Lumbar                       [] Prone          []Supine                       []Intermittent   []Continuous Lbs:  [] before manual  [] after manual  [] w/ heat  [] Simultaneously performed with w/ Estim    []  Ultrasound: []Continuous   [] Pulsed                       at: []1MHz   []3MHz Location:  W/cm2:    [] Paraffin         Location:   []w/heat   10 [x]  Ice     []  Heat  []  Ice massage Position: seated w/ heel prop  Location: R knee    []  Laser  []  Other: Position:  Location:      []  Vasopneumatic Device Pressure:       [] lo [] med [] hi   [] w/ ice      Temperature:   [] Simultaneously performed with w/ Estim     [x] Skin assessment post-treatment:  [x]intact []redness- no adverse reaction    []redness - adverse reaction:       74 min Therapeutic Exercise:  [x] See flow sheet :   Rationale: increase ROM and increase strength to improve the patients ability to improve functional mobility         With   [x] TE   [] TA   [] neuro   [] other: Patient Education: [x] Review HEP    [] Progressed/Changed HEP based on:   [] positioning   [] body mechanics   [] transfers   [] heat/ice application    [] other:      Other Objective/Functional Measures: Decreased step height to 4\" with lunges     Pain Level (0-10 scale) post treatment: 0/10    ASSESSMENT/Changes in Function: Patient tolerated treatment fairly well. Pt able to get knee down to 4\" step for a deeper knee flexion with lunges. Pt still limited in knee flexion and extension. Patient will continue to benefit from skilled PT services to address functional mobility deficits, address ROM deficits and address strength deficits to attain remaining goals. [x]  See Plan of Care  []  See progress note/recertification  []  See Discharge Summary         Progress towards goals / Updated goals:  Short Term Goals: To be accomplished in 15 treatments. 1. Pt will increase knee flexion AROM by 10 degrees in order to improve pt ability to step into her bathtub. - Met (patient uses walk in shower. Has not tried the bathtub yet.)  2. Pt will increase knee extension AROM by 5 degrees in order to improve pt ability to ambulate with a normalized gait pattern  -Met   3. Pt will ambulate x 500 feet with least restrictive device and no increase in pain in order to allow pt to navigate the aisle of the grocery store. Met (knee lory sometimes, uses cart in the store.  Uses cane when she comes out of house.  No AD within home.)     Long Term Goals: To be accomplished in 30  treatments. 1. Pt will increase knee flexion AROM to 105 degrees in order to improve pt ability negotiate stairs with a reciprocal gait pattern. - Partially Met  2. Pt will increase knee extension ROM to 0 degrees in order to improve pt ability to ambulate with no AD. -Partially Met  3.  Pt will increase R knee strength to 5/5 in order to improve pt ability to squat to pick an object off of the floor. -Met  4. Pt will negotiate x5 steps with least restrictive device and reciprocal gait pattern in order to improve pt ability to enter/exit her home without an exacerbation of symptoms. -Partially met.  (uses step to gait pattern    PLAN  [x]  Upgrade activities as tolerated     [x]  Continue plan of care  []  Update interventions per flow sheet       []  Discharge due to:_  []  Other:_      Fox Dill PTA, SABRINA 10/21/2021

## 2021-10-26 ENCOUNTER — HOSPITAL ENCOUNTER (OUTPATIENT)
Dept: PHYSICAL THERAPY | Age: 56
Discharge: HOME | End: 2021-10-26
Payer: OTHER GOVERNMENT

## 2021-10-26 PROCEDURE — 97110 THERAPEUTIC EXERCISES: CPT

## 2021-10-26 NOTE — PROGRESS NOTES
PT DAILY TREATMENT NOTE 2-15    Patient Name: Manju Doshi  Date:10/26/2021  : 1965  [x]  Patient  Verified  Payor: SHOSHANA / Plan: [facility]  [facility] REGION / Product Type:  /    In time: 8:00 AM  Out time:8:55 AM  Total Treatment Time (min): 54  Visit #:  26    Treatment Area: Encounter for other specified surgical aftercare [Z48.89]    SUBJECTIVE  Pain Level (0-10 scale): 0/10  Any medication changes, allergies to medications, adverse drug reactions, diagnosis change, or new procedure performed?: [x] No    [] Yes (see summary sheet for update)  Subjective functional status/changes:   [] No changes reported    I still have trouble keeping my hip flat on the seat. The upright bike allowed me to put more pressure on knee to bend. OBJECTIVE      55 min Therapeutic Exercise:  [] See flow sheet :   Rationale: increase ROM, increase strength, improve coordination, improve balance and increase proprioception to   improve the patients ability to increase knee flexion AROM to 105 degrees in order to improve pt ability negotiate stairs with a reciprocal gait pattern.          With   [] TE   [] TA   [] neuro   [] other: Patient Education: [x] Review HEP    [] Progressed/Changed HEP based on:   [] positioning   [] body mechanics   [] transfers   [] heat/ice application    [] other:      Other Objective/Functional Measures: PROM:  On biodex 109 degree  - right knee  Isokinetic 120 degrees 30/29     90 degrees - 30/29     Pain Level (0-10 scale) post treatment: 0/10    ASSESSMENT/Changes in Function:   Patient tolerated treatment well. Performed warm up on upright bike for increasing range of motion. Patient continues to have difficulty achieving full [place] on bike. Patient will continue to benefit from skilled PT services to address ROM deficits, address strength deficits and analyze and address soft tissue restrictions to attain remaining goals.      [x]  See Plan of Care  []  See progress note/recertification  []  See Discharge Summary           Progress towards goals / Updated goals:  Short Term Goals: To be accomplished in 15 treatments. 1. Pt will increase knee flexion AROM by 10 degrees in order to improve pt ability to step into her bathtub. - Met (patient uses walk in shower. Has not tried the bathtub yet.)  2. Pt will increase knee extension AROM by 5 degrees in order to improve pt ability to ambulate with a normalized gait pattern  -Met   3. Pt will ambulate x 500 feet with least restrictive device and no increase in pain in order to allow pt to navigate the aisle of the grocery store. Met (knee lory sometimes, uses cart in the store.  Uses cane when she comes out of house.  No AD within home.)      Long Term Goals: To be accomplished in 30  treatments. 1. Pt will increase knee flexion AROM to 105 degrees in order to improve pt ability negotiate stairs with a reciprocal gait pattern. - Partially Met  2. Pt will increase knee extension ROM to 0 degrees in order to improve pt ability to ambulate with no AD. -Partially Met  3. Pt will increase R knee strength to 5/5 in order to improve pt ability to squat to pick an object off of the floor. -Met  4. Pt will negotiate x5 steps with least restrictive device and reciprocal gait pattern in order to improve pt ability to enter/exit her home without an exacerbation of symptoms. -Partially met.  (uses step to gait pattern)    PLAN  [x]  Upgrade activities as tolerated     [x]  Continue plan of care  []  Update interventions per flow sheet       []  Discharge due to:_  []  Other:_      Zofia Garcia, PT,  10/26/2021

## 2021-10-28 ENCOUNTER — APPOINTMENT (OUTPATIENT)
Dept: PHYSICAL THERAPY | Age: 56
End: 2021-10-28
Payer: OTHER GOVERNMENT

## 2021-11-02 ENCOUNTER — HOSPITAL ENCOUNTER (OUTPATIENT)
Dept: PHYSICAL THERAPY | Age: 56
Discharge: HOME | End: 2021-11-02
Payer: OTHER GOVERNMENT

## 2021-11-02 PROCEDURE — 97110 THERAPEUTIC EXERCISES: CPT

## 2021-11-02 NOTE — PROGRESS NOTES
PT DAILY TREATMENT NOTE 2-15    Patient Name: Balta Wells  Date:2021  : 1965  [x]  Patient  Verified  Payor: SHOSHANA / Plan: [facility]audh[facility] 75 / Product Type:  /    In time: 7:33  Out time: 8:36  Total Treatment Time (min): 63  Visit #:  27    Treatment Area: Encounter for other specified surgical aftercare [Z48.89]    SUBJECTIVE  Pain Level (0-10 scale): 0/10  Any medication changes, allergies to medications, adverse drug reactions, diagnosis change, or new procedure performed?: [x] No    [] Yes (see summary sheet for update)  Subjective functional status/changes:   [] No changes reported  \"I see the dr next Friday. \"    OBJECTIVE    63 min Therapeutic Exercise:  [x] See flow sheet :   Rationale: increase ROM and increase strength to improve the patients ability to improve functional mobility        With   [x] TE   [] TA   [] neuro   [] other: Patient Education: [x] Review HEP    [] Progressed/Changed HEP based on:   [] positioning   [] body mechanics   [] transfers   [] heat/ice application    [] other:      Pain Level (0-10 scale) post treatment: 0/10    ASSESSMENT/Changes in Function: Patient tolerated treatment fairly well. Pt continues to be limited in R knee flexion. Patient will continue to benefit from skilled PT services to address functional mobility deficits, address ROM deficits and address strength deficits to attain remaining goals. [x]  See Plan of Care  []  See progress note/recertification  []  See Discharge Summary         Progress towards goals / Updated goals:  Short Term Goals: To be accomplished in 15 treatments. 1. Pt will increase knee flexion AROM by 10 degrees in order to improve pt ability to step into her bathtub. - Met (patient uses walk in shower. Has not tried the bathtub yet.)  2. Pt will increase knee extension AROM by 5 degrees in order to improve pt ability to ambulate with a normalized gait pattern  -Met   3.  Pt will ambulate x 500 feet with least restrictive device and no increase in pain in order to allow pt to navigate the aisle of the grocery store. Met (knee lory sometimes, uses cart in the store.  Uses cane when she comes out of house.  No AD within home.)      Long Term Goals: To be accomplished in 30  treatments. 1. Pt will increase knee flexion AROM to 105 degrees in order to improve pt ability negotiate stairs with a reciprocal gait pattern. - Partially Met  2. Pt will increase knee extension ROM to 0 degrees in order to improve pt ability to ambulate with no AD. -Partially Met  3. Pt will increase R knee strength to 5/5 in order to improve pt ability to squat to pick an object off of the floor. -Met  4. Pt will negotiate x5 steps with least restrictive device and reciprocal gait pattern in order to improve pt ability to enter/exit her home without an exacerbation of symptoms. -Partially met.  (uses step to gait pattern)    PLAN  [x]  Upgrade activities as tolerated     [x]  Continue plan of care  []  Update interventions per flow sheet       []  Discharge due to:_  []  Other:_      Yuliya Polk PTA, LPTA 11/2/2021

## 2021-11-03 ENCOUNTER — HOSPITAL ENCOUNTER (OUTPATIENT)
Dept: PHYSICAL THERAPY | Age: 56
Discharge: HOME | End: 2021-11-03
Payer: OTHER GOVERNMENT

## 2021-11-03 PROCEDURE — 97110 THERAPEUTIC EXERCISES: CPT

## 2021-11-03 NOTE — PROGRESS NOTES
PT DAILY TREATMENT NOTE 2-15    Patient Name: Gabby Vernon  Date:11/3/2021  : 1965  [x]  Patient  Verified  Payor:  / Plan: [facility]  [facility] / Product Type:  /    In time: 7:35  Out time: 8:28  Total Treatment Time (min): 48  Visit #:  28    Treatment Area: Encounter for other specified surgical aftercare [Z48.89]    SUBJECTIVE  Pain Level (0-10 scale): 0/10  Any medication changes, allergies to medications, adverse drug reactions, diagnosis change, or new procedure performed?: [x] No    [] Yes (see summary sheet for update)  Subjective functional status/changes:   [x] No changes reported    OBJECTIVE    53 min Therapeutic Exercise:  [x] See flow sheet :   Rationale: increase ROM and increase strength to improve the patients ability to improve functional mobility         With   [x] TE   [] TA   [] neuro   [] other: Patient Education: [x] Review HEP    [] Progressed/Changed HEP based on:   [] positioning   [] body mechanics   [] transfers   [] heat/ice application    [] other:      Pain Level (0-10 scale) post treatment: 0/10    ASSESSMENT/Changes in Function: Patient tolerated treatment fairly well. No change in Biodex PROM numbers today due to tightness. Patient will continue to benefit from skilled PT services to address functional mobility deficits, address ROM deficits and address strength deficits to attain remaining goals. [x]  See Plan of Care  []  See progress note/recertification  []  See Discharge Summary         Progress towards goals / Updated goals:  Short Term Goals: To be accomplished in 15 treatments. 1. Pt will increase knee flexion AROM by 10 degrees in order to improve pt ability to step into her bathtub. - Met (patient uses walk in shower. Has not tried the bathtub yet.)  2. Pt will increase knee extension AROM by 5 degrees in order to improve pt ability to ambulate with a normalized gait pattern  -Met   3.  Pt will ambulate x 500 feet with least restrictive device and no increase in pain in order to allow pt to navigate the aisle of the grocery store. Met (knee lory sometimes, uses cart in the store.  Uses cane when she comes out of house.  No AD within home.)      Long Term Goals: To be accomplished in 30  treatments. 1. Pt will increase knee flexion AROM to 105 degrees in order to improve pt ability negotiate stairs with a reciprocal gait pattern. - Partially Met  2. Pt will increase knee extension ROM to 0 degrees in order to improve pt ability to ambulate with no AD. -Partially Met  3. Pt will increase R knee strength to 5/5 in order to improve pt ability to squat to pick an object off of the floor. -Met  4. Pt will negotiate x5 steps with least restrictive device and reciprocal gait pattern in order to improve pt ability to enter/exit her home without an exacerbation of symptoms. -Partially met.  (uses step to gait pattern)    PLAN  [x]  Upgrade activities as tolerated     [x]  Continue plan of care  []  Update interventions per flow sheet       []  Discharge due to:_  []  Other:_      Gaetano Cornell PTA, LPKRISTEN 11/3/2021

## 2021-11-10 ENCOUNTER — APPOINTMENT (OUTPATIENT)
Dept: PHYSICAL THERAPY | Age: 56
End: 2021-11-10
Payer: OTHER GOVERNMENT

## 2021-11-10 ENCOUNTER — HOSPITAL ENCOUNTER (OUTPATIENT)
Dept: PHYSICAL THERAPY | Age: 56
Discharge: HOME | End: 2021-11-10
Payer: OTHER GOVERNMENT

## 2021-11-10 PROCEDURE — 97110 THERAPEUTIC EXERCISES: CPT

## 2021-11-10 NOTE — PROGRESS NOTES
[address], [address] [place]  Ph: [phone]    Fax: [phone]    Discharge Summary  215    Patient name: Shawanda Gold  : 1965  Provider#: [id number]  Referral source: Dorian Briceno MD      Medical/Treatment Diagnosis: Encounter for other specified surgical aftercare [Z48.89]     Prior Hospitalization: see medical history     Comorbidities: See Plan of Care  Prior Level of Function:See Plan of Care  Medications: Verified on Patient Summary List    Start of Care: 8/10/2021      Onset Date:2021   Visits from Start of Care: 29 Missed Visits: 3  Reporting Period : 8/10/2021 to 11/10/2021      ASSESSMENT/SUMMARY OF CARE:   Pt has decided not to undergo manipulation to increase flexion at this time. She is still having pain, likely from inflammation but she is still within the window for post-op healing. She has been pushed hard with therapy but measures at this time are not significantly different than last progress note. With no manipulation to improve flexion the patient had likely reached her maximal potential and is cleared for discharge at this time. []? See Plan of Care  []? See progress note/recertification  [x]? See Discharge Summary         Progress towards goals / Updated goals:  Short Term Goals: To be accomplished in 15 treatments. 1. Pt will increase knee flexion AROM by 10 degrees in order to improve pt ability to step into her bathtub. - Met (patient uses walk in shower. Has not tried the bathtub yet.)  2. Pt will increase knee extension AROM by 5 degrees in order to improve pt ability to ambulate with a normalized gait pattern  -Met   3.  Pt will ambulate x 500 feet with least restrictive device and no increase in pain in order to allow pt to navigate the aisle of the grocery store. Met (knee lory sometimes, uses cart in the store.  Uses cane when she comes out of house.  No AD within home.)      Long Term Goals: To be accomplished in 30  treatments. 1. Pt will increase knee flexion AROM to 105 degrees in order to improve pt ability negotiate stairs with a reciprocal gait pattern. - Not Met  2. Pt will increase knee extension ROM to 0 degrees in order to improve pt ability to ambulate with no AD. -Met  3. Pt will increase R knee strength to 5/5 in order to improve pt ability to squat to pick an object off of the floor. -Met  4. Pt will negotiate x5 steps with least restrictive device and reciprocal gait pattern in order to improve pt ability to enter/exit her home without an exacerbation of symptoms. -Met.  (uses step to gait pattern)    RECOMMENDATIONS:  [x]Discontinue therapy: [x]Patient has reached or is progressing toward set goals      []Patient is non-compliant or has abdicated      []Due to lack of appreciable progress towards set goals      [x]Other: Plateau in function      Niko Jones, PT, DPT 11/10/2021

## 2021-11-10 NOTE — PROGRESS NOTES
PT DAILY TREATMENT NOTE 2-15    Patient Name: Pita Stack  Date:11/10/2021  : 1965  [x]  Patient  Verified  Payor: SHOSHANA / Plan: [facility] / Product Type:  /    In time:1105  Out time:1130  Total Treatment Time (min): 25  Visit #:  29    Treatment Area: Encounter for other specified surgical aftercare [Z48.89]    SUBJECTIVE  Pain Level (0-10 scale): 0/10  Any medication changes, allergies to medications, adverse drug reactions, diagnosis change, or new procedure performed?: [x] No    [] Yes (see summary sheet for update)  Subjective functional status/changes:   [] No changes reported  \"I see the doctor on Friday, its bending as good as my other knee. It still hurts at night and when I stand for a long time, but I don't think more therapy will help. \"    OBJECTIVE      10 min Therapeutic Exercise:  [x] See flow sheet :   Rationale: increase ROM and increase strength to improve the patients ability to perform functional mobility without pain            With   [x] TE   [] TA   [] neuro   [] other: Patient Education: [x] Review HEP    [] Progressed/Changed HEP based on:   [] positioning   [] body mechanics   [] transfers   [] heat/ice application    [] other:      Other Objective/Functional Measures:   R LE Strength 5/5 throughout   R Knee AROM:  0-73  R Knee PROM: 0-92    Pain Level (0-10 scale) post treatment: 0/10    ASSESSMENT/Changes in Function:   Patient tolerated treatment well today. She has decided not to undergo manipulation to increase flexion at this time. She is still having pain, likely from inflammation but she is still within the window for post-op healing. She has been pushed hard with therapy but measures at this time are not significantly different than last progress note. With no manipulation to improve flexion the patient had likely reached her maximal potential and is cleared for discharge at this time.      [x]  See Plan of Care  []  See progress note/recertification  [x]  See Discharge Summary         Progress towards goals / Updated goals:  Short Term Goals: To be accomplished in 15 treatments. 1. Pt will increase knee flexion AROM by 10 degrees in order to improve pt ability to step into her bathtub. - Met (patient uses walk in shower. Has not tried the bathtub yet.)  2. Pt will increase knee extension AROM by 5 degrees in order to improve pt ability to ambulate with a normalized gait pattern  -Met   3. Pt will ambulate x 500 feet with least restrictive device and no increase in pain in order to allow pt to navigate the aisle of the grocery store. Met (knee lory sometimes, uses cart in the store.  Uses cane when she comes out of house.  No AD within home.)      Long Term Goals: To be accomplished in 30  treatments. 1. Pt will increase knee flexion AROM to 105 degrees in order to improve pt ability negotiate stairs with a reciprocal gait pattern. - Not Met  2. Pt will increase knee extension ROM to 0 degrees in order to improve pt ability to ambulate with no AD. -Met  3. Pt will increase R knee strength to 5/5 in order to improve pt ability to squat to pick an object off of the floor. -Met  4. Pt will negotiate x5 steps with least restrictive device and reciprocal gait pattern in order to improve pt ability to enter/exit her home without an exacerbation of symptoms. -Met.  (uses step to gait pattern)    PLAN  []  Upgrade activities as tolerated     []  Continue plan of care  []  Update interventions per flow sheet       [x]  Discharge due to:  Lis Record in function  []  Other:_      Akil Jenkins, PT, DPT 11/10/2021

## 2021-11-11 ENCOUNTER — APPOINTMENT (OUTPATIENT)
Dept: PHYSICAL THERAPY | Age: 56
End: 2021-11-11
Payer: OTHER GOVERNMENT

## 2022-03-10 ENCOUNTER — TRANSCRIBE ORDER (OUTPATIENT)
Dept: SCHEDULING | Age: 57
End: 2022-03-10

## 2022-03-10 DIAGNOSIS — Z12.31 SCREENING MAMMOGRAM FOR HIGH-RISK PATIENT: Primary | ICD-10-CM

## 2022-03-17 ENCOUNTER — HOSPITAL ENCOUNTER (OUTPATIENT)
Dept: MAMMOGRAPHY | Age: 57
Discharge: HOME | End: 2022-03-17
Payer: OTHER GOVERNMENT

## 2022-03-17 DIAGNOSIS — Z12.31 SCREENING MAMMOGRAM FOR HIGH-RISK PATIENT: ICD-10-CM

## 2022-03-17 PROCEDURE — 77063 BREAST TOMOSYNTHESIS BI: CPT

## 2022-03-19 PROBLEM — M17.11 OSTEOARTHRITIS OF RIGHT KNEE: Status: ACTIVE | Noted: 2021-06-28

## 2022-04-15 ENCOUNTER — HOSPITAL ENCOUNTER (OUTPATIENT)
Dept: PHYSICAL THERAPY | Age: 57
Discharge: HOME | End: 2022-04-15
Payer: OTHER GOVERNMENT

## 2022-04-15 PROCEDURE — 97110 THERAPEUTIC EXERCISES: CPT

## 2022-04-15 PROCEDURE — 97161 PT EVAL LOW COMPLEX 20 MIN: CPT

## 2022-04-15 NOTE — PROGRESS NOTES
[address]  [place], [address] [place]  Ph: [phone]    Fax: [phone]    Plan of Care/Statement of Necessity for Physical Therapy Services  2-15    Patient name: Lizz Estes  : 1965  Provider#: [id number]  Referral source: Malena Beaver MD      Medical/Treatment Diagnosis: Pain in right knee [M25.561]  Other chronic pain [G89.29]  Presence of right artificial knee joint [Z96.651]     Prior Hospitalization: see medical history     Comorbidities: see medical history  Prior Level of Function: Independent with all ADL's; no use of AD  Medications: Verified on Patient Summary List    Start of Care: 4/15/2022      Onset Date: 2022       The Plan of Care and following information is based on the information from the initial evaluation. Assessment/ key information:  Patient presents to physical therapy with pain, decreased ROM, and lower extremity weakness limiting functional activities. The patient would benefit from physical therapy to utilize modalities to decrease pain, increase ROM, and strength to maximize function. Pt will receive treatment including lower extremity flexibility, ROM, strengthening, modalities for pain control, functional activities, balance, and proprioception activities. Patient's symptoms are consistent with s/p R TKA and most recent manipulation. Pt with fair prognosis secondary to history of difficulty gaining rom post previous TKA. Patient was provided with CPM for use at home, and was instructed to use 3x/day. Patient was instructed in HEP with 1:1 supervision and given pictures to facilitate compliance. Patient would benefit from skilled physical therapy to progress toward goals and maximize function.   Thank you for this referral.       Evaluation Complexity History LOW Complexity : Zero comorbidities / personal factors that will impact the outcome / POC; Examination LOW Complexity : 1-2 Standardized tests and measures addressing body structure, function, activity limitation and / or participation in recreation  ;Presentation LOW Complexity : Stable, uncomplicated  ;Clinical Decision Making   Overall Complexity Rating: LOW     Problem List: pain affecting function, decrease ROM, decrease strength, edema affecting function, impaired gait/ balance, decrease ADL/ functional abilitiies, decrease activity tolerance, decrease flexibility/ joint mobility and decrease transfer abilities   Treatment Plan may include any combination of the following: Therapeutic exercise, Therapeutic activities, Neuromuscular re-education, Physical agent/modality, Gait/balance training, Manual therapy, Patient education, Self Care training, Functional mobility training and Stair training  Patient / Family readiness to learn indicated by: asking questions, trying to perform skills and interest  Persons(s) to be included in education: patient (P)  Barriers to Learning/Limitations: None  Patient Goal (s): to get more range of motion  Patient Self Reported Health Status: excellent  Rehabilitation Potential: good    Short Term Goals: To be accomplished in 9 treatments. 1. Patient will demonstrate independence and compliance with HEP in order to assist with carryover from PT services. 2.   Patient will be able to work in garden without reports of difficulty to increase functional mobility. 3.   Patient will be able to play with her grandchildren outside without reports of difficulty to increase functional mobility. 4.   Patient will be able to complete all ADLs without pain greater than or equal to 0/10 to increase functional mobility. 5.   Patient will increase right knee flexion arom to 75 in order to improve gait. Long Term Goals: To be accomplished in 15  treatments. 1.   Patient will be able to complete all household chores without pain greater than or equal to 0/10 to increase functional mobility.   2.   Patient will increase right knee flexion arom to 80 in order to improve gait. 3.   Patient will increase right knee extension arom to -15 in order to improve gait pattern. Frequency / Duration: Patient to be seen 3 times per week for 15 treatments. Patient/ Caregiver education and instruction: exercises    [x]  Plan of care has been reviewed with PAM Hebert, PT , DPT  4/15/2022     ________________________________________________________________________    I certify that the above Therapy Services are being furnished while the patient is under my care. I agree with the treatment plan and certify that this therapy is necessary.     Physician's Signature:____________________  Date:____________Time: _________    Patient name: Radhames Apodaca  : 1965  Provider#: [id number]

## 2022-04-15 NOTE — PROGRESS NOTES
PT INITIAL EVALUATION NOTE 2-15    Patient Name: Aurora Gross  Date:4/15/2022  : 1965  [x]  Patient  Verified  Payor: SHOSHANA / Plan: [facility] / Product Type: Junaid Miller /    In time:0810  Out time:0910  Total Treatment Time (min): 60  Visit #: 1     Treatment Area: Pain in right knee [M25.561]  Other chronic pain [G89.29]  Presence of right artificial knee joint [Z96.651]    SUBJECTIVE  Pain Level (0-10 scale): 0/10  Any medication changes, allergies to medications, adverse drug reactions, diagnosis change, or new procedure performed?: [] No    [x] Yes (see summary sheet for update)  Subjective:     Pt is 55year old  [place] female who presents to physical therapy post R TKA 2021, followed up by a manipulation and scope to remove scar tissue on 2022. Pt reports she couldn't walk or stand for long periods of time after the R TKA, had difficulty with knee flexion, and increased swelling. Pt reports her left knee has started to bother her now because she is favoring it more and it has bone on bone. 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 PLOF: Independent with all ADL's; no use of AD  Mechanism of Injury: post R TKA and manipulation  Previous Treatment/Compliance: PT post R TKA, initially home health, then outpatient Aug-2021  Radiographs: none since procedure  What increases symptoms: mobility  What decreases symptoms: rest  Work Hx: CNA  Living Situation: with ; able to complete all ADLs independently  Pt Goals: to improve range of motion  Barriers: history of limited rom  Motivation: Good  Substance use: None reported  Cognition: A&O x 4  Fall Assessment: No falls risk assessment indicated at this time.   Past Medical History:  Past Medical History:   Diagnosis Date    Arthritis     Hypertension     Knee pain, right     X 4-5 years     Past Surgical History:  Past Surgical History: Procedure Laterality Date    HX CARPAL TUNNEL RELEASE Right     aug 2020    HX ORTHOPAEDIC      Right knee, arthosocpy and cartilage removal    HX TOTAL ABDOMINAL HYSTERECTOMY      HX TUBAL LIGATION       Current Medications:  Current Outpatient Medications   Medication Instructions    acetaminophen (TYLENOL) 975 mg, Oral, EVERY 8 HOURS AS NEEDED    amLODIPine (NORVASC) 5 mg, Oral, DAILY    cholecalciferol, vitamin D3, (Vitamin D3) 50 mcg (2,000 unit) tab 1 Tablet, Oral, DAILY    fish oil-omega-3 fatty acids (Fish Oil) 300-500 mg cap 1 Caplet, Oral, EVERY OTHER DAY    MITCH EXTRACT PO 1 Tablet, Oral, DAILY    multivit/folic acid/vit K1 (ONE-A-DAY WOMEN'S 50 PLUS PO) 1 Tablet, Oral, DAILY    ondansetron (ZOFRAN ODT) 4 mg, Oral, EVERY 6 HOURS AS NEEDED          OBJECTIVE/EXAMINATION  Posture:  good  Gait and Functional Mobility:  Use of RW; limited reliance on walker for stability    Hip:  Strength AROM PROM     Right Left Right Left Right Left    Flexion 4+/5 4+/5       Knee:  Strength AROM PROM     Right Left Right Left Right Left    Flexion 4/5 5/5 70 80 75     Extension 4/5 5/5 -18 -5 -15    Ankle  Strength AROM PROM     Right Left Right Left Right Left    Dorsiflexion 5/5 5/5        Plantarflexion 5/5 5/5        Inversion 5/5 5/5        Eversion 5/5 5/5       *All strength measures are on a scale with 5 as a maximum, if a space is left blank it was not tested. All ROM measurements are measured in degrees.     Flexibility: very limited, B knees        25 min Therapeutic Exercise:  [x] See flow sheet :   Rationale: increase ROM, increase strength, improve coordination, improve balance and increase proprioception to improve the patients ability to ambulate with less difficulty    With   [x] TE   [] TA   [] neuro   [] other: Patient Education: [x] Provided HEP    [] Progressed/Changed HEP based on:   [] positioning   [] body mechanics   [] transfers   [] heat/ice application    [] other:        Pain Level (0-10 scale) post treatment: 0/10      ASSESSMENT:    [x]  See Plan of Nathalie. 49, PT, DPT  4/15/2022

## 2022-04-18 ENCOUNTER — HOSPITAL ENCOUNTER (OUTPATIENT)
Dept: PHYSICAL THERAPY | Age: 57
Discharge: HOME | End: 2022-04-18
Payer: OTHER GOVERNMENT

## 2022-04-18 PROCEDURE — 97110 THERAPEUTIC EXERCISES: CPT

## 2022-04-18 NOTE — PROGRESS NOTES
PT DAILY TREATMENT NOTE 2-15    Patient Name: Josiah Restrepo  Date:2022  : 1965  [x]  Patient  Verified  Payor:  / Plan: [facility] [address] / Product Type:  /    In time:1502  Out time:1600  Total Treatment Time (min): 62  Visit #:  2    Treatment Area: Pain in right knee [M25.561]  Other chronic pain [G89.29]  Presence of right artificial knee joint [Z96.651]    SUBJECTIVE  Pain Level (0-10 scale): 0/10  Any medication changes, allergies to medications, adverse drug reactions, diagnosis change, or new procedure performed?: [x] No    [] Yes (see summary sheet for update)  Subjective functional status/changes:   [] No changes reported  \"I have been working this knee hard over the weekend. \"    OBJECTIVE      Modality rationale: decrease edema, decrease inflammation and decrease pain to improve the patients ability to perform R knee AROM without pain   Min Type Additional Details       [] Estim: []Att   []Unatt    []TENS instruct                  []IFC  []Premod   []NMES                    []Other:  []w/US      []w/ heat  []w/ ice  Position:  Location:       []  Traction: [] Cervical       []Lumbar                       [] Prone          []Supine                       []Intermittent   []Continuous Lbs:  [] before manual  [] after manual  [] w/ heat  [] Simultaneously performed with w/ Estim    []  Ultrasound: []Continuous   [] Pulsed                       at: []1MHz   []3MHz Location:  W/cm2:    [] Paraffin         Location:   []w/heat   10 [x]  Ice     []  Heat  []  Ice massage Position: seated  Location: R knee    []  Laser  []  Other: Position:  Location:      []  Vasopneumatic Device Pressure:       [] lo [] med [] hi   [] w/ ice      Temperature:   [] Simultaneously performed with w/ Estim     [x] Skin assessment post-treatment:  [x]intact []redness- no adverse reaction    []redness - adverse reaction:       48 min Therapeutic Exercise:  [x] See flow sheet :   Rationale: increase ROM and increase strength to improve the patients ability to perform functional mobility with pain or limitation from the R knee          With   [x] TE   [] TA   [] neuro   [] other: Patient Education: [x] Review HEP    [] Progressed/Changed HEP based on:   [] positioning   [] body mechanics   [] transfers   [] heat/ice application    [] other:      Other Objective/Functional Measures: advanced range on the Biodex by quite a bit today     Pain Level (0-10 scale) post treatment: 0/10    ASSESSMENT/Changes in Function:   Patient tolerated treatment well. Cues were provided with quad and hamstring stretched to ensure those muscles were properly stretched. Continue POC progressing as able. Patient will continue to benefit from skilled PT services to modify and progress therapeutic interventions, address functional mobility deficits, address ROM deficits, address strength deficits, analyze and address soft tissue restrictions, analyze and cue movement patterns and address imbalance/dizziness to attain remaining goals. [x]  See Plan of Care  []  See progress note/recertification  []  See Discharge Summary         Progress towards goals / Updated goals:  Short Term Goals: To be accomplished in 9 treatments. 1. Patient will demonstrate independence and compliance with HEP in order to assist with carryover from PT services. Met  2. Patient will be able to work in garden without reports of difficulty to increase functional mobility. Partially Met  3. Patient will be able to play with her grandchildren outside without reports of difficulty to increase functional mobility. Partially Met  4. Patient will be able to complete all ADLs without pain greater than or equal to 0/10 to increase functional mobility. Partially Met  5. Patient will increase right knee flexion arom to 75 in order to improve gait. Partially Met          Long Term Goals: To be accomplished in 15  treatments.   1.   Patient will be able to complete all household chores without pain greater than or equal to 0/10 to increase functional mobility. Partially Met  2. Patient will increase right knee flexion arom to 80 in order to improve gait. Partially Met  3. Patient will increase right knee extension arom to -15 in order to improve gait pattern.   Partially Met      PLAN  [x]  Upgrade activities as tolerated     [x]  Continue plan of care  []  Update interventions per flow sheet       []  Discharge due to:_  []  Other:_      Verona Douglas, PT, DPT 4/18/2022

## 2022-04-20 ENCOUNTER — HOSPITAL ENCOUNTER (OUTPATIENT)
Dept: PHYSICAL THERAPY | Age: 57
Discharge: HOME | End: 2022-04-20
Payer: OTHER GOVERNMENT

## 2022-04-20 PROCEDURE — 97110 THERAPEUTIC EXERCISES: CPT

## 2022-04-20 PROCEDURE — 97016 VASOPNEUMATIC DEVICE THERAPY: CPT

## 2022-04-20 NOTE — PROGRESS NOTES
PT DAILY TREATMENT NOTE 2-15    Patient Name: Ac Teague  Date:2022  : 1965  [x]  Patient  Verified  Payor:  / Plan: [facility]  [facility] [place] / Product Type:  /    In time: 1:04  Out time: 2:12  Total Treatment Time (min): 68  Visit #:  3    Treatment Area: Pain in right knee [M25.561]  Other chronic pain [G89.29]  Presence of right artificial knee joint [Z96.651]    SUBJECTIVE  Pain Level (0-10 scale): 0/10  Any medication changes, allergies to medications, adverse drug reactions, diagnosis change, or new procedure performed?: [x] No    [] Yes (see summary sheet for update)  Subjective functional status/changes:   [] No changes reported  Pt comes in today not using any AD, but still has limp    OBJECTIVE    Modality rationale: decrease edema, decrease inflammation and decrease pain to improve the patients ability to be able to perform AROM   Min Type Additional Details       [] Estim: []Att   []Unatt    []TENS instruct                  []IFC  []Premod   []NMES                    []Other:  []w/US      []w/ heat  []w/ ice  Position:  Location:       []  Traction: [] Cervical       []Lumbar                       [] Prone          []Supine                       []Intermittent   []Continuous Lbs:  [] before manual  [] after manual  [] w/ heat  [] Simultaneously performed with w/ Estim    []  Ultrasound: []Continuous   [] Pulsed                       at: []1MHz   []3MHz Location:  W/cm2:    [] Paraffin         Location:   []w/heat    []  Ice     []  Heat  []  Ice massage Position:  Location:    []  Laser  []  Other: Position:  Location:     10 [x]  Vasopneumatic Device Pressure:       [x] lo [] med [] hi   [x] w/ ice      Temperature: 34deg   [] Simultaneously performed with w/ Estim     [x] Skin assessment post-treatment:  [x]intact []redness- no adverse reaction    []redness - adverse reaction:       58 min Therapeutic Exercise:  [x] See flow sheet :   Rationale: increase ROM and increase strength to improve the patients ability to improve functional mobility         With   [x] TE   [] TA   [] neuro   [] other: Patient Education: [x] Review HEP    [] Progressed/Changed HEP based on:   [] positioning   [] body mechanics   [] transfers   [] heat/ice application    [] other:      Pain Level (0-10 scale) post treatment: 0/10    ASSESSMENT/Changes in Function: Patient tolerated treatment fairly well. Pt still very limited in knee flexion. Patient will continue to benefit from skilled PT services to address functional mobility deficits, address ROM deficits and address strength deficits to attain remaining goals. [x]  See Plan of Care  []  See progress note/recertification  []  See Discharge Summary         Progress towards goals / Updated goals:  Short Term Goals: To be accomplished in 9 treatments. 1. Patient will demonstrate independence and compliance with HEP in order to assist with carryover from PT services. Met  2.   Patient will be able to work in garden without reports of difficulty to increase functional mobility. Partially Met  3.   Patient will be able to play with her grandchildren outside without reports of difficulty to increase functional mobility. Partially Met  4.   Patient will be able to complete all ADLs without pain greater than or equal to 0/10 to increase functional mobility. Partially Met  5.   Patient will increase right knee flexion arom to 75 in order to improve gait. Partially Met    Long Term Goals: To be accomplished in 15  treatments. 1.   Patient will be able to complete all household chores without pain greater than or equal to 0/10 to increase functional mobility. Partially Met  2.   Patient will increase right knee flexion arom to 80 in order to improve gait. Partially Met  3.   Patient will increase right knee extension arom to -15 in order to improve gait pattern.   Partially Met    PLAN  [x]  Upgrade activities as tolerated     [x]  Continue plan of care  []  Update interventions per flow sheet       []  Discharge due to:_  []  Other:_      Meke Bermudez PTA, SABRINA 4/20/2022

## 2022-04-22 ENCOUNTER — HOSPITAL ENCOUNTER (OUTPATIENT)
Dept: PHYSICAL THERAPY | Age: 57
Discharge: HOME | End: 2022-04-22
Payer: OTHER GOVERNMENT

## 2022-04-22 PROCEDURE — 97110 THERAPEUTIC EXERCISES: CPT

## 2022-04-22 PROCEDURE — 97016 VASOPNEUMATIC DEVICE THERAPY: CPT

## 2022-04-22 NOTE — PROGRESS NOTES
PT DAILY TREATMENT NOTE 2-15    Patient Name: Bro Griffith  Date:2022  : 1965  [x]  Patient  Verified  Payor:  / Plan: [employer] [facility] / Product Type:  /    In time: 4143  Out time: 8549  Total Treatment Time (min): 60  Visit #:  4    Treatment Area: Pain in right knee [M25.561]  Other chronic pain [G89.29]  Presence of right artificial knee joint [Z96.651]    SUBJECTIVE  Pain Level (0-10 scale): 0/10  Any medication changes, allergies to medications, adverse drug reactions, diagnosis change, or new procedure performed?: [x] No    [] Yes (see summary sheet for update)  Subjective functional status/changes:   [] No changes reported  \"I plan to swing my leg on the tailgate all weekend. I see the doctor next Friday. It feels so good. \"    OBJECTIVE    Modality rationale: decrease edema, decrease inflammation and decrease pain to improve the patients ability to be able to perform AROM   Min Type Additional Details       [] Estim: []Att   []Unatt    []TENS instruct                  []IFC  []Premod   []NMES                    []Other:  []w/US      []w/ heat  []w/ ice  Position:  Location:       []  Traction: [] Cervical       []Lumbar                       [] Prone          []Supine                       []Intermittent   []Continuous Lbs:  [] before manual  [] after manual  [] w/ heat  [] Simultaneously performed with w/ Estim    []  Ultrasound: []Continuous   [] Pulsed                       at: []1MHz   []3MHz Location:  W/cm2:    [] Paraffin         Location:   []w/heat    []  Ice     []  Heat  []  Ice massage Position:  Location:    []  Laser  []  Other: Position:  Location:     10 [x]  Vasopneumatic Device Pressure:       [x] lo [] med [] hi   [x] w/ ice      Temperature: 34deg   [] Simultaneously performed with w/ Estim     [x] Skin assessment post-treatment:  [x]intact []redness- no adverse reaction    []redness - adverse reaction:       45 min Therapeutic Exercise:  [x] See flow sheet :   Rationale: increase ROM and increase strength to improve the patients ability to improve functional mobility         With   [x] TE   [] TA   [] neuro   [] other: Patient Education: [x] Review HEP    [] Progressed/Changed HEP based on:   [] positioning   [] body mechanics   [] transfers   [] heat/ice application    [] other:      Pain Level (0-10 scale) post treatment: 0/10    ASSESSMENT/Changes in Function: Patient tolerated treatment fairly well. Pt still very limited in knee flexion. Pt able to tolerate increase in PROM on Biodex today, demonstrating progression. Addition of sit to stand and marching on trampoline to improve le strength, rom, flexibility, and endurance. Patient will continue to benefit from skilled PT services to address functional mobility deficits, address ROM deficits and address strength deficits to attain remaining goals. [x]  See Plan of Care  []  See progress note/recertification  []  See Discharge Summary         Progress towards goals / Updated goals:  Short Term Goals: To be accomplished in 9 treatments. 1. Patient will demonstrate independence and compliance with HEP in order to assist with carryover from PT services. Met  2.   Patient will be able to work in garden without reports of difficulty to increase functional mobility. Partially Met  3.   Patient will be able to play with her grandchildren outside without reports of difficulty to increase functional mobility. Partially Met  4.   Patient will be able to complete all ADLs without pain greater than or equal to 0/10 to increase functional mobility. Partially Met  5.   Patient will increase right knee flexion arom to 75 in order to improve gait. Partially Met    Long Term Goals: To be accomplished in 15  treatments. 1.   Patient will be able to complete all household chores without pain greater than or equal to 0/10 to increase functional mobility. Partially Met  2.   Patient will increase right knee flexion arom to 80 in order to improve gait. Partially Met  3.   Patient will increase right knee extension arom to -15 in order to improve gait pattern.   Partially Met    PLAN  [x]  Upgrade activities as tolerated     [x]  Continue plan of care  []  Update interventions per flow sheet       []  Discharge due to:_  []  Other:_      Alex Yoon, PT, DPT 4/22/2022

## 2022-04-25 ENCOUNTER — HOSPITAL ENCOUNTER (OUTPATIENT)
Dept: PHYSICAL THERAPY | Age: 57
Discharge: HOME | End: 2022-04-25
Payer: OTHER GOVERNMENT

## 2022-04-25 PROCEDURE — 97016 VASOPNEUMATIC DEVICE THERAPY: CPT

## 2022-04-25 PROCEDURE — 97110 THERAPEUTIC EXERCISES: CPT

## 2022-04-25 NOTE — PROGRESS NOTES
PT DAILY TREATMENT NOTE 2-15    Patient Name: Mario Alberto Wilson  Date:2022  : 1965  [x]  Patient  Verified  Payor:  / Plan: [facility] REGION / Product Type:  /    In time: 1:03  Out time: 2:00  Total Treatment Time (min): 62  Visit #:  5    Treatment Area: Pain in right knee [M25.561]  Other chronic pain [G89.29]  Presence of right artificial knee joint [Z96.651]    SUBJECTIVE  Pain Level (0-10 scale): 0/10  Any medication changes, allergies to medications, adverse drug reactions, diagnosis change, or new procedure performed?: [x] No    [] Yes (see summary sheet for update)  Subjective functional status/changes:   [x] No changes reported    OBJECTIVE    Modality rationale: decrease edema, decrease inflammation and decrease pain to improve the patients ability to be able to perform AROM   Min Type Additional Details       [] Estim: []Att   []Unatt    []TENS instruct                  []IFC  []Premod   []NMES                    []Other:  []w/US      []w/ heat  []w/ ice  Position:  Location:       []  Traction: [] Cervical       []Lumbar                       [] Prone          []Supine                       []Intermittent   []Continuous Lbs:  [] before manual  [] after manual  [] w/ heat  [] Simultaneously performed with w/ Estim    []  Ultrasound: []Continuous   [] Pulsed                       at: []1MHz   []3MHz Location:  W/cm2:    [] Paraffin         Location:   []w/heat    []  Ice     []  Heat  []  Ice massage Position:  Location:    []  Laser  []  Other: Position:  Location:     10 [x]  Vasopneumatic Device Pressure:       [x] lo [] med [] hi   [x] w/ ice      Temperature: 34deg  [] Simultaneously performed with w/ Estim     [x] Skin assessment post-treatment:  [x]intact []redness- no adverse reaction    []redness - adverse reaction:       47 min Therapeutic Exercise:  [x] See flow sheet :   Rationale: increase ROM and increase strength to improve the patients ability to improve functional mobility         With   [x] TE   [] TA   [] neuro   [] other: Patient Education: [x] Review HEP    [] Progressed/Changed HEP based on:   [] positioning   [] body mechanics   [] transfers   [] heat/ice application    [] other:      Pain Level (0-10 scale) post treatment: 0/10    ASSESSMENT/Changes in Function: Patient tolerated treatment fairly well. Pt continues to be very tight in knee flexion. Patient will continue to benefit from skilled PT services to address functional mobility deficits, address ROM deficits and address strength deficits to attain remaining goals. [x]  See Plan of Care  []  See progress note/recertification  []  See Discharge Summary         Progress towards goals / Updated goals:  Short Term Goals: To be accomplished in 9 treatments. 1. Patient will demonstrate independence and compliance with HEP in order to assist with carryover from PT services.  Met  2.   Patient will be able to work in garden without reports of difficulty to increase functional mobility.  Partially Met  3.   Patient will be able to play with her grandchildren outside without reports of difficulty to increase functional mobility.  Partially Met  4.   Patient will be able to complete all ADLs without pain greater than or equal to 0/10 to increase functional mobility.  Partially Met  5.   Patient will increase right knee flexion arom to 75 in order to improve gait.  Partially Met     Long Term Goals: To be accomplished in 15  treatments. 1.   Patient will be able to complete all household chores without pain greater than or equal to 0/10 to increase functional mobility. Partially Met  2.   Patient will increase right knee flexion arom to 80 in order to improve gait.  Partially Met  3.   Patient will increase right knee extension arom to -15 in order to improve gait pattern.  Partially Met    PLAN  [x]  Upgrade activities as tolerated     [x]  Continue plan of care  []  Update interventions per flow sheet       []  Discharge due to:_  []  Other:_      Justen Cespedes PTA, LPKRISTEN 4/25/2022

## 2022-04-27 ENCOUNTER — HOSPITAL ENCOUNTER (OUTPATIENT)
Dept: PHYSICAL THERAPY | Age: 57
Discharge: HOME | End: 2022-04-27
Payer: OTHER GOVERNMENT

## 2022-04-27 PROCEDURE — 97110 THERAPEUTIC EXERCISES: CPT

## 2022-04-27 NOTE — PROGRESS NOTES
PT DAILY TREATMENT NOTE 2-15    Patient Name: Lynne Enter  Date:2022  : 1965  [x]  Patient  Verified  Payor: SHOSHANA / Plan: [employer] [facility] / Product Type:  /    In time:   Out time: 326  Total Treatment Time (min): 30  Visit #:  6    Treatment Area: Pain in right knee [M25.561]  Other chronic pain [G89.29]  Presence of right artificial knee joint [Z96.651]    SUBJECTIVE  Pain Level (0-10 scale): 0/10  Any medication changes, allergies to medications, adverse drug reactions, diagnosis change, or new procedure performed?: [x] No    [] Yes (see summary sheet for update)  Subjective functional status/changes:   [] No changes reported  \"I need to leave by 1:35pm today. \"   OBJECTIVE    30 min Therapeutic Exercise:  [x] See flow sheet :   Rationale: increase ROM and increase strength to improve the patients ability to improve functional mobility         With   [x] TE   [] TA   [] neuro   [] other: Patient Education: [x] Review HEP    [] Progressed/Changed HEP based on:   [] positioning   [] body mechanics   [] transfers   [] heat/ice application    [] other:      Pain Level (0-10 scale) post treatment: 0/10    ASSESSMENT/Changes in Function: Patient tolerated treatment fairly well. Limited treatment session per pt request today. Initiated exercises which pt is not able to perform and home and DPT reiterated importance of compliance with HEP. Pt continues to be very tight in knee flexion. Patient will continue to benefit from skilled PT services to address functional mobility deficits, address ROM deficits and address strength deficits to attain remaining goals. [x]  See Plan of Care  []  See progress note/recertification  []  See Discharge Summary         Progress towards goals / Updated goals:  Short Term Goals: To be accomplished in 9 treatments.   1. Patient will demonstrate independence and compliance with HEP in order to assist with carryover from PT services.  Met  2.   Patient will be able to work in garden without reports of difficulty to increase functional mobility.  Partially Met  3.   Patient will be able to play with her grandchildren outside without reports of difficulty to increase functional mobility.  Partially Met  4.   Patient will be able to complete all ADLs without pain greater than or equal to 0/10 to increase functional mobility.  Partially Met  5.   Patient will increase right knee flexion arom to 75 in order to improve gait.  Partially Met     Long Term Goals: To be accomplished in 15  treatments. 1.   Patient will be able to complete all household chores without pain greater than or equal to 0/10 to increase functional mobility. Partially Met  2.   Patient will increase right knee flexion arom to 80 in order to improve gait.  Partially Met  3.   Patient will increase right knee extension arom to -15 in order to improve gait pattern.  Partially Met    PLAN  [x]  Upgrade activities as tolerated     [x]  Continue plan of care  []  Update interventions per flow sheet       []  Discharge due to:_  []  Other:_      Alicia Morrissey, PT, DPT  4/27/2022

## 2022-04-29 ENCOUNTER — APPOINTMENT (OUTPATIENT)
Dept: PHYSICAL THERAPY | Age: 57
End: 2022-04-29
Payer: OTHER GOVERNMENT

## 2022-05-02 ENCOUNTER — HOSPITAL ENCOUNTER (OUTPATIENT)
Dept: PHYSICAL THERAPY | Age: 57
Discharge: HOME | End: 2022-05-02
Payer: OTHER GOVERNMENT

## 2022-05-02 PROCEDURE — 97110 THERAPEUTIC EXERCISES: CPT

## 2022-05-02 NOTE — PROGRESS NOTES
PT DAILY TREATMENT NOTE 2-15    Patient Name: Alo Pulido  Date:2022  : 1965  [x]  Patient  Verified  Payor: SHOSHANA / Plan: [employer] [facility] / Product Type:  /    In time: 1:05  Out time: 2:05  Total Treatment Time (min): 60  Visit #:  7    Treatment Area: Pain in right knee [M25.561]  Other chronic pain [G89.29]  Presence of right artificial knee joint [Z96.651]    SUBJECTIVE  Pain Level (0-10 scale): 0/10  Any medication changes, allergies to medications, adverse drug reactions, diagnosis change, or new procedure performed?: [x] No    [] Yes (see summary sheet for update)  Subjective functional status/changes:     Pt states she saw the dr on Friday. Took ace bandage off. Continue PT and CPM at home    OBJECTIVE    60 min Therapeutic Exercise:  [x] See flow sheet :   Rationale: increase ROM and increase strength to improve the patients ability to improve functional mobility         With   [x] TE   [] TA   [] neuro   [] other: Patient Education: [x] Review HEP    [] Progressed/Changed HEP based on:   [] positioning   [] body mechanics   [] transfers   [] heat/ice application    [] other:      Other Objective/Functional Measures: Added leg press    Pain Level (0-10 scale) post treatment: 0/10    ASSESSMENT/Changes in Function: Patient tolerated treatment fairly well. Noted decreased numbers on Biodex PROM today. Pt continues to lift hips on Biodex when knee is flexing. Added leg press with increased emphasis on stretch when flexing. Patient will continue to benefit from skilled PT services to address functional mobility deficits, address ROM deficits and address strength deficits to attain remaining goals. [x]  See Plan of Care  []  See progress note/recertification  []  See Discharge Summary         Progress towards goals / Updated goals:  Short Term Goals: To be accomplished in 9 treatments.   1. Patient will demonstrate independence and compliance with HEP in order to assist with carryover from PT services.  Met  2.   Patient will be able to work in garden without reports of difficulty to increase functional mobility.  Partially Met  3.   Patient will be able to play with her grandchildren outside without reports of difficulty to increase functional mobility.  Partially Met  4.   Patient will be able to complete all ADLs without pain greater than or equal to 0/10 to increase functional mobility.  Partially Met  5.   Patient will increase right knee flexion arom to 75 in order to improve gait.  Partially Met     Long Term Goals: To be accomplished in 15  treatments. 1.   Patient will be able to complete all household chores without pain greater than or equal to 0/10 to increase functional mobility. Partially Met  2.   Patient will increase right knee flexion arom to 80 in order to improve gait.  Partially Met  3.   Patient will increase right knee extension arom to -15 in order to improve gait pattern.  Partially Met    PLAN  [x]  Upgrade activities as tolerated     [x]  Continue plan of care  []  Update interventions per flow sheet       []  Discharge due to:_  []  Other:_      Ever Segundo, PTA, LPTA 5/2/2022

## 2022-05-04 ENCOUNTER — HOSPITAL ENCOUNTER (OUTPATIENT)
Dept: PHYSICAL THERAPY | Age: 57
Discharge: HOME | End: 2022-05-04
Payer: OTHER GOVERNMENT

## 2022-05-04 PROCEDURE — 97016 VASOPNEUMATIC DEVICE THERAPY: CPT

## 2022-05-04 PROCEDURE — 97110 THERAPEUTIC EXERCISES: CPT

## 2022-05-04 NOTE — PROGRESS NOTES
PT DAILY TREATMENT NOTE 2-15    Patient Name: Andrea Tamayo  Date:2022  : 1965  [x]  Patient  Verified  Payor:  / Plan: [employer] [facility] / Product Type:  /    In time:1305  Out time:1415  Total Treatment Time (min): 70  Visit #:  8    Treatment Area: Pain in right knee [M25.561]  Other chronic pain [G89.29]  Presence of right artificial knee joint [Z96.651]    SUBJECTIVE  Pain Level (0-10 scale): 0/10  Any medication changes, allergies to medications, adverse drug reactions, diagnosis change, or new procedure performed?: [x] No    [] Yes (see summary sheet for update)  Subjective functional status/changes: \"Pt reports her knee is getting better but is still using the CPM at home 3 times a day. \"    OBJECTIVE      Modality rationale: decrease edema, decrease inflammation and decrease pain to improve the patients ability to perform R knee AROM without pain   Min Type Additional Details       - Estim: -Att   -Unatt    -TENS instruct                  -IFC  -Premod   -NMES                    -Other:  -w/US      -w/ heat  -w/ ice  Position:   Location:       -  Traction: - Cervical       -Lumbar                       - Prone          -Supine                       -Intermittent   -Continuous Lbs:  - before manual  - after manual  - w/ heat  - Simultaneously performed with w/ Estim    -  Ultrasound: -Continuous   - Pulsed                       at: -1MHz   -3MHz Location:  W/cm2:    - Paraffin         Location:   -w/heat    -  Ice     -  Heat  -  Ice massage Position:  Location:    -  Laser  -  Other: Position:  Location:     10 [x]  Vasopneumatic Device Pressure:       [] lo [x] med [] hi   [x] w/ ice      Temperature: 34   [] Simultaneously performed with w/ Estim     [x] Skin assessment post-treatment:  [x]intact []redness- no adverse reaction    []redness - adverse reaction:       60 min Therapeutic Exercise:  [x] See flow sheet :   Rationale: increase ROM, increase strength and improve balance to improve the patients ability to perform functional mobility without pain or limitation          With   [x] TE   [] TA   [] neuro   [] other: Patient Education: [x] Review HEP    [] Progressed/Changed HEP based on:   [] positioning   [] body mechanics   [] transfers   [] heat/ice application    [] other:      Other Objective/Functional Measures: Pt continues to improve AROM with her R Knee  Pain Level (0-10 scale) post treatment: 0/10    ASSESSMENT/Changes in Function:   Patient tolerated treatment well. Pain levels are low, she follows-up with her doctor at the end of the month. Continue POC progressing as able. Patient will continue to benefit from skilled PT services to modify and progress therapeutic interventions, address functional mobility deficits, address ROM deficits, address strength deficits, analyze and address soft tissue restrictions, analyze and cue movement patterns and address imbalance/dizziness to attain remaining goals. [x]  See Plan of Care  []  See progress note/recertification  []  See Discharge Summary         Progress towards goals / Updated goals:  Short Term Goals: To be accomplished in 9 treatments. 1. Patient will demonstrate independence and compliance with HEP in order to assist with carryover from PT services.  Met  2.   Patient will be able to work in garden without reports of difficulty to increase functional mobility.  Partially Met  3.   Patient will be able to play with her grandchildren outside without reports of difficulty to increase functional mobility.  Partially Met  4.   Patient will be able to complete all ADLs without pain greater than or equal to 0/10 to increase functional mobility.  Partially Met  5.   Patient will increase right knee flexion arom to 75 in order to improve gait.  Partially Met     Long Term Goals: To be accomplished in 15  treatments.   1.   Patient will be able to complete all household chores without pain greater than or equal to 0/10 to increase functional mobility. Partially Met  2.   Patient will increase right knee flexion arom to 80 in order to improve gait.  Partially Met  3.   Patient will increase right knee extension arom to -15 in order to improve gait pattern.  Partially Met    PLAN  [x]  Upgrade activities as tolerated     [x]  Continue plan of care  []  Update interventions per flow sheet       []  Discharge due to:_  []  Other:_      Carolee Phoenix, PT, DPT 5/4/2022

## 2022-05-06 ENCOUNTER — HOSPITAL ENCOUNTER (OUTPATIENT)
Dept: PHYSICAL THERAPY | Age: 57
Discharge: HOME | End: 2022-05-06
Payer: OTHER GOVERNMENT

## 2022-05-06 PROCEDURE — 97110 THERAPEUTIC EXERCISES: CPT

## 2022-05-06 NOTE — PROGRESS NOTES
PT DAILY TREATMENT NOTE 2-15    Patient Name: Martha Buckley  Date:2022  : 1965  [x]  Patient  Verified  Payor:  / Plan: [facility] REGION / Product Type:  /    In time:1:04 PM  Out time:2:10 PM  Total Treatment Time (min): 77  Visit #:  9    Treatment Area: Pain in right knee [M25.561]  Other chronic pain [G89.29]  Presence of right artificial knee joint [Z96.651]    SUBJECTIVE  Pain Level (0-10 scale): 0/10  Any medication changes, allergies to medications, adverse drug reactions, diagnosis change, or new procedure performed?: [x] No    [] Yes (see summary sheet for update)  Subjective functional status/changes:     \"I am just a little stiff today. \"    OBJECTIVE      Modality rationale: decrease edema, decrease inflammation and decrease pain to improve the patients ability to tolerate daily tasks and increased activity.    Min Type Additional Details       [] Estim: []Att   []Unatt    []TENS instruct                  []IFC  []Premod   []NMES                    []Other:  []w/US      []w/ heat  []w/ ice  Position:  Location:       []  Traction: [] Cervical       []Lumbar                       [] Prone          []Supine                       []Intermittent   []Continuous Lbs:  [] before manual  [] after manual  [] w/ heat  [] Simultaneously performed with w/ Estim    []  Ultrasound: []Continuous   [] Pulsed                       at: []1MHz   []3MHz Location:  W/cm2:    [] Paraffin         Location:   []w/heat   10 [x]  Ice     []  Heat  []  Ice massage Position: seated knee ext  Location: R knee     []  Laser  []  Other: Position:  Location:    []  Vasopneumatic Device Pressure:       [] lo [] med [] hi   [] w/ ice      Temperature:    [] Simultaneously performed with w/ Estim     [x] Skin assessment post-treatment:  [x]intact [x]redness- no adverse reaction    []redness - adverse reaction:       56 min Therapeutic Exercise:  [x] See flow sheet :   Rationale: increase ROM, increase strength, improve coordination, improve balance and increase proprioception to improve the patients ability to perform daily tasks and improve gait. With   [x] TE   [] TA   [] neuro   [] other: Patient Education: [x] Review HEP    [] Progressed/Changed HEP based on:   [] positioning   [] body mechanics   [] transfers   [] heat/ice application    [] other:      Pain Level (0-10 scale) post treatment: 0/10    ASSESSMENT/Changes in Function:   Patient tolerated treatment well today. She is becoming more functional at each session. We will plan to add in more strengthening at next session. Patient will continue to benefit from skilled PT services to modify and progress therapeutic interventions, address functional mobility deficits, address ROM deficits, address strength deficits, analyze and address soft tissue restrictions, analyze and cue movement patterns and analyze and modify body mechanics/ergonomics to attain remaining goals. [x]  See Plan of Care  []  See progress note/recertification  []  See Discharge Summary         Progress towards goals / Updated goals:  Short Term Goals: To be accomplished in 9 treatments. 1. Patient will demonstrate independence and compliance with HEP in order to assist with carryover from PT services.  Met  2.   Patient will be able to work in garden without reports of difficulty to increase functional mobility.  Partially Met  3.   Patient will be able to play with her grandchildren outside without reports of difficulty to increase functional mobility.  Partially Met  4.   Patient will be able to complete all ADLs without pain greater than or equal to 0/10 to increase functional mobility.  Partially Met  5.   Patient will increase right knee flexion arom to 75 in order to improve gait.  Partially Met     Long Term Goals: To be accomplished in 15  treatments.   1.   Patient will be able to complete all household chores without pain greater than or equal to 0/10 to increase functional mobility. Partially Met  2.   Patient will increase right knee flexion arom to 80 in order to improve gait.  Partially Met  3.   Patient will increase right knee extension arom to -15 in order to improve gait pattern.  Partially Met    PLAN  [x]  Upgrade activities as tolerated     [x]  Continue plan of care  []  Update interventions per flow sheet       []  Discharge due to:_  []  Other:_      Zeferino Urbano, PT, DPT 5/6/2022

## 2022-05-09 ENCOUNTER — HOSPITAL ENCOUNTER (OUTPATIENT)
Dept: PHYSICAL THERAPY | Age: 57
Discharge: HOME | End: 2022-05-09
Payer: OTHER GOVERNMENT

## 2022-05-09 PROCEDURE — 97110 THERAPEUTIC EXERCISES: CPT

## 2022-05-09 PROCEDURE — 97016 VASOPNEUMATIC DEVICE THERAPY: CPT

## 2022-05-09 NOTE — PROGRESS NOTES
[address]  [place], [place]  Ph: [phone]    Fax: [phone]    Progress Note    Name: Shay Okeefe   : 1965   MD: Akil Campos MD       Treatment Diagnosis: Pain in right knee [M25.561]  Other chronic pain [G89.29]  Presence of right artificial knee joint [Z96.651]  Start of Care: 4/15/2022    Visits from Start of Care: 10   Missed Visits: 0    Summary of Care / Assessment / Recommendations:   Patient presented to physical therapy with right knee pain, weakness and decreased range of motion limiting all weight bearing activities. Pt has received treatment including range of motion, strengthening, modalities for pain control, balance, and proprioception activities. Pt has demonstrated overall progress in strength, rom, pain level, and functional mobility. Pt is still considerably limited in AROM B, however with the progress shown since IE, and most recent manipulation procedure, DPT to focus further physical therapy on achieving additional knee flexion ROM and meeting all of pt's goals. Pt would benefit from further skilled physical therapy interventions to continue to progress range of motion, strength, and balance, allowing for improved function. Progress towards goals / Updated goals:  Short Term Goals: To be accomplished in 9 treatments.   1. Patient will demonstrate independence and compliance with HEP in order to assist with carryover from PT services.  Met  2.   Patient will be able to work in garden without reports of difficulty to increase functional mobility.  Met  3.   Patient will be able to play with her grandchildren outside without reports of difficulty to increase functional mobility.  Partially Met (haven't had the opportunity)  4.   Patient will be able to complete all ADLs without pain greater than or equal to 0/10 to increase functional mobility.  Met  5.   Patient will increase right knee flexion arom to 75 in order to improve gait.  Partially Met     Long Term Goals: To be accomplished in 15  treatments. 1.   Patient will be able to complete all household chores without pain greater than or equal to 0/10 to increase functional mobility. Met  2.   Patient will increase right knee flexion arom to 80 in order to improve gait.  Partially Met  3.   Patient will increase right knee extension arom to -15 in order to improve gait pattern.  Partially Met    Frequency/Duration:  2 treatments per week, for 5 weeks. Milka Moreno, PT, DPT  5/9/2022         ________________________________________________________________________  NOTE TO PHYSICIAN:  Please complete the following and fax to:  [facility]:   Fax: [phone]  . Retain this original for your records. If you are unable to process this request in 24 hours, please contact our office.        ____ I have read the above report and request that my patient continue therapy with the following changes/special instructions:  ____ I have read the above report and request that my patient be discharged from therapy    Physician's Signature:_________________ Date:___________Time:__________

## 2022-05-09 NOTE — PROGRESS NOTES
PT DAILY TREATMENT NOTE 2-15    Patient Name: Aurora Gross  Date:2022  : 1965  [x]  Patient  Verified  Payor: SHOSHANA / Plan: [facility] / Product Type:  /    In time:  Out time: 141  Total Treatment Time (min): 79  Visit #:  10    Treatment Area: Pain in right knee [M25.561]  Other chronic pain [G89.29]  Presence of right artificial knee joint [Z96.651]    SUBJECTIVE  Pain Level (0-10 scale): 0/10  Any medication changes, allergies to medications, adverse drug reactions, diagnosis change, or new procedure performed?: [x] No    [] Yes (see summary sheet for update)  Subjective functional status/changes:     \"I still do the CPM at home, 3x/day for an hour. I do a lot of the exercises. \"    OBJECTIVE  Posture:  good  Gait and Functional Mobility:  Use of no AD               Hip:   Strength AROM PROM       Right Left Right Left Right Left     Flexion 4+/5 4+/5           Knee:   Strength AROM PROM       Right Left Right Left Right Left     Flexion 5/5 5/5 77 80 81       Extension 5/5 5/5 -10 -5 -15     Ankle   Strength AROM PROM       Right Left Right Left Right Left     Dorsiflexion 5/5 5/5             Plantarflexion 5/5 5/5             Inversion 5/5 5/5             Eversion 5/5 5/5           *All strength measures are on a scale with 5 as a maximum, if a space is left blank it was not tested. All ROM measurements are measured in degrees.     Flexibility: very limited, B knees      Modality rationale: decrease edema, decrease inflammation and decrease pain to improve the patients ability to tolerate daily tasks and increased activity.    Min Type Additional Details       [] Estim: []Att   []Unatt    []TENS instruct                  []IFC  []Premod   []NMES                    []Other:  []w/US      []w/ heat  []w/ ice  Position:  Location:       []  Traction: [] Cervical       []Lumbar                       [] Prone          []Supine                       []Intermittent []Continuous Lbs:  [] before manual  [] after manual  [] w/ heat  [] Simultaneously performed with w/ Estim    []  Ultrasound: []Continuous   [] Pulsed                       at: []1MHz   []3MHz Location:  W/cm2:    [] Paraffin         Location:   []w/heat    []  Ice     []  Heat  []  Ice massage Position: seated knee ext  Location: R knee     []  Laser  []  Other: Position:  Location:   10 [x]  Vasopneumatic Device Pressure:       [x] lo [] med [] hi   [x] w/ ice      Temperature:  34  [] Simultaneously performed with w/ Estim     [x] Skin assessment post-treatment:  [x]intact [x]redness- no adverse reaction    []redness - adverse reaction:       60 min Therapeutic Exercise:  [x] See flow sheet :   Rationale: increase ROM, increase strength, improve coordination, improve balance and increase proprioception to improve the patients ability to perform daily tasks and improve gait. With   [x] TE   [] TA   [] neuro   [] other: Patient Education: [x] Review HEP    [] Progressed/Changed HEP based on:   [] positioning   [] body mechanics   [] transfers   [] heat/ice application    [] other:      Pain Level (0-10 scale) post treatment: 0/10    ASSESSMENT/Changes in Function:   Patient presented to physical therapy with right knee pain, weakness and decreased range of motion limiting all weight bearing activities. Pt has received treatment including range of motion, strengthening, modalities for pain control, balance, and proprioception activities. Pt has demonstrated overall progress in strength, rom, pain level, and functional mobility. Pt is still considerably limited in AROM B, however with the progress shown since IE, and most recent manipulation procedure, DPT to focus further physical therapy on achieving additional knee flexion ROM and meeting all of pt's goals.   Pt would benefit from further skilled physical therapy interventions to continue to progress range of motion, strength, and balance, allowing for improved function. Patient tolerated treatment well today. Progress note completed with updated goals and measurements today. She is becoming more functional at each session. We will plan to add in more strengthening and focus on knee flexion AROM at next session. Patient will continue to benefit from skilled PT services to modify and progress therapeutic interventions, address functional mobility deficits, address ROM deficits, address strength deficits, analyze and address soft tissue restrictions, analyze and cue movement patterns and analyze and modify body mechanics/ergonomics to attain remaining goals. [x]  See Plan of Care  []  See progress note/recertification  []  See Discharge Summary         Progress towards goals / Updated goals:  Short Term Goals: To be accomplished in 9 treatments. 1. Patient will demonstrate independence and compliance with HEP in order to assist with carryover from PT services.  Met  2.   Patient will be able to work in garden without reports of difficulty to increase functional mobility.  Met  3.   Patient will be able to play with her grandchildren outside without reports of difficulty to increase functional mobility.  Partially Met (haven't had the opportunity)  4.   Patient will be able to complete all ADLs without pain greater than or equal to 0/10 to increase functional mobility.  Met  5.   Patient will increase right knee flexion arom to 75 in order to improve gait.  Partially Met     Long Term Goals: To be accomplished in 15  treatments. 1.   Patient will be able to complete all household chores without pain greater than or equal to 0/10 to increase functional mobility. Met  2.   Patient will increase right knee flexion arom to 80 in order to improve gait.  Partially Met  3.   Patient will increase right knee extension arom to -15 in order to improve gait pattern.  Partially Met    PLAN  [x]  Upgrade activities as tolerated     [x]  Continue plan of care  [] Update interventions per flow sheet       []  Discharge due to:_   []  Other:_      Larissa Maguire, PT, DPT 5/9/2022

## 2022-05-11 ENCOUNTER — APPOINTMENT (OUTPATIENT)
Dept: PHYSICAL THERAPY | Age: 57
End: 2022-05-11
Payer: OTHER GOVERNMENT

## 2022-05-13 ENCOUNTER — APPOINTMENT (OUTPATIENT)
Dept: PHYSICAL THERAPY | Age: 57
End: 2022-05-13
Payer: OTHER GOVERNMENT

## 2022-05-16 ENCOUNTER — APPOINTMENT (OUTPATIENT)
Dept: PHYSICAL THERAPY | Age: 57
End: 2022-05-16
Payer: OTHER GOVERNMENT

## 2022-05-18 ENCOUNTER — HOSPITAL ENCOUNTER (OUTPATIENT)
Dept: PHYSICAL THERAPY | Age: 57
Discharge: HOME | End: 2022-05-18
Payer: OTHER GOVERNMENT

## 2022-05-18 PROCEDURE — 97110 THERAPEUTIC EXERCISES: CPT

## 2022-05-18 NOTE — PROGRESS NOTES
PT DAILY TREATMENT NOTE 2-15    Patient Name: Allen Bourgeois  Date:2022  : 1965  [x]  Patient  Verified  Payor: SHOSHANA / Plan: [facility] [facility] / Product Type:  /    In time: 1:02  Out time: 1:59  Total Treatment Time (min): 57  Visit #:  11    Treatment Area: Pain in right knee [M25.561]  Other chronic pain [G89.29]  Presence of right artificial knee joint [Z96.651]    SUBJECTIVE  Pain Level (0-10 scale): 0/10  Any medication changes, allergies to medications, adverse drug reactions, diagnosis change, or new procedure performed?: [x] No    [] Yes (see summary sheet for update)  Subjective functional status/changes:     \"I can go all the way around on my bike at home. \"    OBJECTIVE    57 min Therapeutic Exercise:  [x] See flow sheet :   Rationale: increase ROM and increase strength to improve the patients ability to improve functional mobility         With   [x] TE   [] TA   [] neuro   [] other: Patient Education: [x] Review HEP    [] Progressed/Changed HEP based on:   [] positioning   [] body mechanics   [] transfers   [] heat/ice application    [] other:      Pain Level (0-10 scale) post treatment: 0/10    ASSESSMENT/Changes in Function: Patient tolerated treatment well. Focus today on knee flexion, per last progress note. Pt continues to be limited in flexion. Patient will continue to benefit from skilled PT services to address functional mobility deficits, address ROM deficits and address strength deficits to attain remaining goals. [x]  See Plan of Care  []  See progress note/recertification  []  See Discharge Summary         Progress towards goals / Updated goals:  Short Term Goals: To be accomplished in 9 treatments.   1. Patient will demonstrate independence and compliance with HEP in order to assist with carryover from PT services.  Met  2.   Patient will be able to work in garden without reports of difficulty to increase functional mobility.  Met  3.   Patient will be able to play with her grandchildren outside without reports of difficulty to increase functional mobility.  Partially Met (haven't had the opportunity)  4.   Patient will be able to complete all ADLs without pain greater than or equal to 0/10 to increase functional mobility.  Met  5.   Patient will increase right knee flexion arom to 75 in order to improve gait.  Partially Met     Long Term Goals: To be accomplished in 15  treatments. 1.   Patient will be able to complete all household chores without pain greater than or equal to 0/10 to increase functional mobility. Met  2.   Patient will increase right knee flexion arom to 80 in order to improve gait.  Partially Met  3.   Patient will increase right knee extension arom to -15 in order to improve gait pattern.  Partially Met    PLAN  [x]  Upgrade activities as tolerated     [x]  Continue plan of care  []  Update interventions per flow sheet       []  Discharge due to:_  []  Other:_      Miracle Gilbert PTA, SABRINA 5/18/2022

## 2022-05-20 ENCOUNTER — HOSPITAL ENCOUNTER (OUTPATIENT)
Dept: PHYSICAL THERAPY | Age: 57
Discharge: HOME | End: 2022-05-20
Payer: OTHER GOVERNMENT

## 2022-05-20 PROCEDURE — 97110 THERAPEUTIC EXERCISES: CPT

## 2022-05-20 NOTE — PROGRESS NOTES
PT DAILY TREATMENT NOTE 2-15    Patient Name: Екатерина Dawson  Date:2022  : 1965  [x]  Patient  Verified  Payor:  / Plan: [facility]  [facility] REGION / Product Type:  /    In time: 1:07  Out time: 2:02  Total Treatment Time (min): 55  Visit #:  12    Treatment Area: Pain in right knee [M25.561]  Other chronic pain [G89.29]  Presence of right artificial knee joint [Z96.651]    SUBJECTIVE  Pain Level (0-10 scale): 0/10  Any medication changes, allergies to medications, adverse drug reactions, diagnosis change, or new procedure performed?: [x] No    [] Yes (see summary sheet for update)  Subjective functional status/changes:     \"I'm doing good. \"    OBJECTIVE    55 min Therapeutic Exercise:  [x] See flow sheet :   Rationale: increase ROM and increase strength to improve the patients ability to improve functional mobility         With   [x] TE   [] TA   [] neuro   [] other: Patient Education: [x] Review HEP    [] Progressed/Changed HEP based on:   [] positioning   [] body mechanics   [] transfers   [] heat/ice application    [] other:      Pain Level (0-10 scale) post treatment: 0/10    ASSESSMENT/Changes in Function: Patient tolerated treatment fairly well. No c/o pain during session. Pt continues to lack motion in her knee, but no limp is noted. Pt continues to elevate buttocks while [employer] is flexing her knee. Patient will continue to benefit from skilled PT services to address functional mobility deficits, address ROM deficits and address strength deficits to attain remaining goals. [x]  See Plan of Care  []  See progress note/recertification  []  See Discharge Summary         Progress towards goals / Updated goals:  Short Term Goals: To be accomplished in 9 treatments.   1. Patient will demonstrate independence and compliance with HEP in order to assist with carryover from PT services.  Met  2.   Patient will be able to work in garden without reports of difficulty to increase functional mobility.  Met  3.   Patient will be able to play with her grandchildren outside without reports of difficulty to increase functional mobility.  Partially Met (haven't had the opportunity)  4.   Patient will be able to complete all ADLs without pain greater than or equal to 0/10 to increase functional mobility.  Met  5.   Patient will increase right knee flexion arom to 75 in order to improve gait.  Partially Met     Long Term Goals: To be accomplished in 15  treatments. 1.   Patient will be able to complete all household chores without pain greater than or equal to 0/10 to increase functional mobility. Met  2.   Patient will increase right knee flexion arom to 80 in order to improve gait.  Partially Met  3.   Patient will increase right knee extension arom to -15 in order to improve gait pattern.  Partially Met    PLAN  [x]  Upgrade activities as tolerated     [x]  Continue plan of care  []  Update interventions per flow sheet       []  Discharge due to:_  []  Other:_      Ever Segundo, PTA, LPTA 5/20/2022

## 2022-05-23 ENCOUNTER — APPOINTMENT (OUTPATIENT)
Dept: PHYSICAL THERAPY | Age: 57
End: 2022-05-23
Payer: OTHER GOVERNMENT

## 2022-05-25 ENCOUNTER — HOSPITAL ENCOUNTER (OUTPATIENT)
Dept: PHYSICAL THERAPY | Age: 57
Discharge: HOME | End: 2022-05-25
Payer: OTHER GOVERNMENT

## 2022-05-25 PROCEDURE — 97110 THERAPEUTIC EXERCISES: CPT

## 2022-05-25 NOTE — PROGRESS NOTES
PT DAILY TREATMENT NOTE 2-15    Patient Name: Donna Obrien  Date:2022  : 1965  [x]  Patient  Verified  Payor: SHOSHANA / Plan: [facility] / Product Type:  /    In time: 1:10  Out time: 2:06  Total Treatment Time (min): 56  Visit #:  13    Treatment Area: Pain in right knee [M25.561]  Other chronic pain [G89.29]  Presence of right artificial knee joint [Z96.651]    SUBJECTIVE  Pain Level (0-10 scale): 0/10  Any medication changes, allergies to medications, adverse drug reactions, diagnosis change, or new procedure performed?: [x] No    [] Yes (see summary sheet for update)  Subjective functional status/changes:     \"Just stiff. \"    OBJECTIVE    56 min Therapeutic Exercise:  [x] See flow sheet :   Rationale: increase ROM and increase strength to improve the patients ability to improve functional mobility         With   [x] TE   [] TA   [] neuro   [] other: Patient Education: [x] Review HEP    [] Progressed/Changed HEP based on:   [] positioning   [] body mechanics   [] transfers   [] heat/ice application    [] other:      Pain Level (0-10 scale) post treatment: 0/10    ASSESSMENT/Changes in Function: Patient tolerated treatment fairly well. Pt is able to complete rotations on recumbent bike, but pt is seated back far and leans whenever she does rotate around. Patient will continue to benefit from skilled PT services to address functional mobility deficits, address ROM deficits and address strength deficits to attain remaining goals. [x]  See Plan of Care  []  See progress note/recertification  []  See Discharge Summary         Progress towards goals / Updated goals:  Short Term Goals: To be accomplished in 9 treatments.   1. Patient will demonstrate independence and compliance with HEP in order to assist with carryover from PT services.  Met  2.   Patient will be able to work in garden without reports of difficulty to increase functional mobility.  Met  3.   Patient will be able to play with her grandchildren outside without reports of difficulty to increase functional mobility.  Partially Met (haven't had the opportunity)  4.   Patient will be able to complete all ADLs without pain greater than or equal to 0/10 to increase functional mobility.  Met  5.   Patient will increase right knee flexion arom to 75 in order to improve gait.  Partially Met     Long Term Goals: To be accomplished in 15  treatments. 1.   Patient will be able to complete all household chores without pain greater than or equal to 0/10 to increase functional mobility. Met  2.   Patient will increase right knee flexion arom to 80 in order to improve gait.  Partially Met  3.   Patient will increase right knee extension arom to -15 in order to improve gait pattern.  Partially Met    PLAN  [x]  Upgrade activities as tolerated     [x]  Continue plan of care  []  Update interventions per flow sheet       []  Discharge due to:_  []  Other:_      Lianne White PTA, LPKRISTEN 5/25/2022

## 2022-05-27 ENCOUNTER — APPOINTMENT (OUTPATIENT)
Dept: PHYSICAL THERAPY | Age: 57
End: 2022-05-27
Payer: OTHER GOVERNMENT

## 2022-05-31 ENCOUNTER — HOSPITAL ENCOUNTER (OUTPATIENT)
Dept: PHYSICAL THERAPY | Age: 57
Discharge: HOME | End: 2022-05-31
Payer: OTHER GOVERNMENT

## 2022-05-31 PROCEDURE — 97164 PT RE-EVAL EST PLAN CARE: CPT

## 2022-05-31 PROCEDURE — 97014 ELECTRIC STIMULATION THERAPY: CPT

## 2022-05-31 PROCEDURE — 97110 THERAPEUTIC EXERCISES: CPT

## 2022-05-31 NOTE — PROGRESS NOTES
PT RE- EVALUATION NOTE 2-15    Patient Name: Rosalina Chantel  Date:2022  : 1965  [x]  Patient  Verified  Payor: SHOSHANA / Plan: Jhonny Chaudhary [address] / Product Type:  /    In time:1:15 PM  Out time: 2:15 PM  Total Treatment Time (min): 60  Visit #: 1     Treatment Area: Pain in right knee [M25.561]  Other chronic pain [G89.29]  Presence of right artificial knee joint [Z96.651]  Iliotibial band tendonitis of right side [M76.31]    SUBJECTIVE  Pain Level (0-10 scale): 0/10  Any medication changes, allergies to medications, adverse drug reactions, diagnosis change, or new procedure performed?: [] No    [x] Yes (see summary sheet for update)    Subjective: 55year old black female who stated that she returned to MD for a 6 week follow-up for her right knee replacement that was performed on 2021 and manipulated on 2022. Romeo Hoover She was complaining of pain of the lateral side of her right hip and knee that continues to be tender when touched, tightness and knee give away. Recent diagnosis of Iliotibial band tendonitis of the right side. PLOF: Independent with all ADL's; no use of AD  Mechanism of Injury: none  Previous Treatment/Compliance: right knee replacement - 21 and manipulation performed on 22  Radiographs: none  What increases symptoms: walking long distances and performing exercises  What decreases symptoms: medication, ICE  Work Hx: CNA  Living Situation: living home with family  Pt Goals:  To be able to increase activities without pain  Barriers: none  Motivation: Good  Substance use: None reported  Cognition: A&O x 4  Fall Assessment:   TUG Test: 12 seconds  Past Medical History:  Past Medical History:   Diagnosis Date    Arthritis     Hypertension     Knee pain, right     X 4-5 years     Past Surgical History:  Past Surgical History:   Procedure Laterality Date    HX CARPAL TUNNEL RELEASE Right     aug 2020    HX ORTHOPAEDIC      Right knee, arthosocpy and cartilage removal    HX TOTAL ABDOMINAL HYSTERECTOMY      HX TUBAL LIGATION       Current Medications:  Current Outpatient Medications   Medication Instructions    acetaminophen (TYLENOL) 975 mg, Oral, EVERY 8 HOURS AS NEEDED    amLODIPine (NORVASC) 5 mg, Oral, DAILY    cholecalciferol, vitamin D3, (Vitamin D3) 50 mcg (2,000 unit) tab 1 Tablet, Oral, DAILY    fish oil-omega-3 fatty acids (Fish Oil) 300-500 mg cap 1 Caplet, Oral, EVERY OTHER DAY    MITCH EXTRACT PO 1 Tablet, Oral, DAILY    multivit/folic acid/vit K1 (ONE-A-DAY WOMEN'S 50 PLUS PO) 1 Tablet, Oral, DAILY    ondansetron (ZOFRAN ODT) 4 mg, Oral, EVERY 6 HOURS AS NEEDED          OBJECTIVE/EXAMINATION      Modality rationale: decrease inflammation, decrease pain and increase tissue extensibility to improve the patients ability to walk with no pain. Min Type Additional Details   15 [x] Estim: []Att   [x]Unatt        []TENS instruct                  [x]IFC  []Premod   []NMES                     []Other:  []w/US   [x]w/ice   []w/heat  Position:right hip/knee and lateral side of upper thigh  In supine position    []  Traction: [] Cervical       []Lumbar                       [] Prone          []Supine                       []Intermittent   []Continuous Lbs:  [] before manual  [] after manual  []w/heat    []  Ultrasound: []Continuous   [] Pulsed at:                           []1MHz   []3MHz Location:  W/cm2:    [] Paraffin         Location:   []w/heat    []  Ice     []  Heat  []  Ice massage Position:  Location:    []  Laser  []  Other: Position:  Location:      []  Vasopneumatic Device Pressure:       [] lo [] med [] hi   Temperature:      [x] Skin assessment post-treatment:  [x]intact []redness- no adverse reaction    []redness - adverse reaction:     15 min Therapeutic Exercise:  [x] See flow sheet :   Rationale: increase ROM to improve the patients ability to walk with no pain.       With   [] TE   [] TA   [] neuro   [] other: Patient Education: [x] Provided HEP    [] Progressed/Changed HEP based on:   [] positioning   [] body mechanics   [] transfers   [] heat/ice application    [] other:      Other Observations:  Decreased ROM of right knee, Not able to cross her knee   Gait and Functional Mobility:  No assistive device required  Palpation: Tenderness and pain of the right hip/knee and the IT band    Hip:  Strength AROM     Right Left Right Left    Flexion 4/5 4/5 105 105    Extension 4/5 4/5 wfl wfl    Abduction 4/5 4/5 wfl wfl    Adduction 5/5 5/5 wfl wfl   Knee:  Strength AROM     Right Left Right Left    Flexion 4+/5 4+/5 80 80    Extension 4+/5 4+/5 -10 -5   *All strength measures are on a scale with 5 as a maximum, if a space is left blank it was not tested. All ROM measurements are measured in degrees.       Neurological: Reflexes / Sensations: Intact      Pain Level (0-10 scale) post treatment: 0/10      ASSESSMENT:    [x]  See Plan of [facility], ,  5/31/2022

## 2022-06-01 NOTE — PROGRESS NOTES
[address]  [place], [place]  Ph: [phone]    Fax: [phone]    Plan of Care/Statement of Necessity for Physical Therapy Services  2-15    Patient name: Hollie Estevez  : 1965  Provider#: [id number]  Referral source: Tatianna Leung MD      Medical/Treatment Diagnosis: Pain in right knee [M25.561]  Other chronic pain [G89.29]  Presence of right artificial knee joint [Z96.651]  Iliotibial band tendonitis of right side [M76.31]     Prior Hospitalization: see medical history     Comorbidities: see medical history  Prior Level of Function: Independent with all ADL's; no use of AD  Medications: Verified on Patient Summary List      Start of Care: 2022      Onset Date: May 27, 2022           The Plan of Care and following information is based on the information from the initial evaluation. Assessment/ key information:   55year old black female referred to Physical Therapy due to pain and inflammation of the right IT band. Patient is already receiving treatment in outpatient physical therapy for right knee replacement. Patient continues to present decreased range of motion of the right knee and now have a flare up of pain from the right hip to the ITB insertion at the lateral side of knee. Therefore, treatment plan will include pain modalities to address inflammation and continuing with range of motion and strengthening of the right knee to return to maximal level of function. Pt has demonstrated overall progress in strength, rom, pain level, and functional mobility. Pt would benefit from further skilled physical therapy interventions to continue to progress range of motion, strength, decrease pain and balance, allowing for improved function.       Evaluation Complexity History LOW Complexity : Zero comorbidities / personal factors that will impact the outcome / POC; Examination LOW Complexity : 1-2 Standardized tests and measures addressing body structure, function, activity limitation and / or participation in recreation  ;Presentation LOW Complexity : Stable, uncomplicated  ;Clinical Decision Making TUG Score: 12  seconds  Overall Complexity Rating: LOW   Problem List: pain affecting function, decrease ROM, decrease strength, edema affecting function, impaired gait/ balance, decrease ADL/ functional abilitiies, decrease activity tolerance and decrease flexibility/ joint mobility   Treatment Plan may include any combination of the following: Therapeutic exercise, Therapeutic activities, Physical agent/modality, Gait/balance training, Manual therapy and Patient education    Patient / Family readiness to learn indicated by: asking questions  Persons(s) to be included in education: patient (P)  Barriers to Learning/Limitations: None  Patient Goal (s): To be able to walk without right knee pain. Patient Self Reported Health Status: fair  Rehabilitation Potential: fair    Short Term Goals: To be accomplished in 6 treatments. 1.   Patient will demonstrate independence and compliance with HEP in order to assist with carryover from PT services.  Met  2.   Patient will be able to work in garden without reports of difficulty to increase functional mobility.  Met  3.   Patient will be able to play with her grandchildren outside without reports of difficulty to increase functional mobility.  Partially Met (haven't had the opportunity)  4.   Patient will be able to complete all ADLs without pain greater than or equal to 0/10 to increase functional mobility.  Met  5.   Patient will increase right knee flexion arom to 75 in order to improve gait.  Partially Met  6. Patient will be able to squat and  object off the floor with pain level of 5/10 with no difficulty. (New Goal 5/31/22)    Long Term Goals: To be accomplished in 12  treatments.   1.   Patient will be able to complete all household chores without pain greater than or equal to 0/10 to increase functional mobility. Met  2.   Patient will increase right knee flexion arom to 80 in order to improve gait.  Partially Met  3.   Patient will increase right knee extension arom to -15 in order to improve gait pattern.  Partially Met  4. Patient will be able to squat and  object off the floor with pain level of 5/10 with no difficulty. ( New Goal 22)    Frequency / Duration: Patient to be seen 2 times per week for 12 treatments. Patient/ Caregiver education and instruction: exercises      [x]  Plan of care has been reviewed with PAM Pardo, PT  2022     ________________________________________________________________________    I certify that the above Therapy Services are being furnished while the patient is under my care. I agree with the treatment plan and certify that this therapy is necessary.     Physician's Signature:____________________  Date:____________Time: _________    Patient name: Bro Griffith  : 1965  Provider#: [id number]

## 2022-06-02 ENCOUNTER — HOSPITAL ENCOUNTER (OUTPATIENT)
Dept: PHYSICAL THERAPY | Age: 57
Discharge: HOME | End: 2022-06-02
Payer: OTHER GOVERNMENT

## 2022-06-02 PROCEDURE — 97110 THERAPEUTIC EXERCISES: CPT

## 2022-06-02 NOTE — PROGRESS NOTES
PT DAILY TREATMENT NOTE 2-15    Patient Name: Angelica Smith  Date:2022  : 1965  [x]  Patient  Verified  Payor:  / Plan: [facility]  [facility]ION / Product Type:  /    In time: 2:03  Out time: 3:00  Total Treatment Time (min): 57    Visit #:  15    Treatment Area: Pain in right knee [M25.561]  Other chronic pain [G89.29]  Presence of right artificial knee joint [Z96.651]  Iliotibial band tendonitis of right side [M76.31]    SUBJECTIVE  Pain Level (0-10 scale): 0/10  Any medication changes, allergies to medications, adverse drug reactions, diagnosis change, or new procedure performed?: [x] No    [] Yes (see summary sheet for update)  Subjective functional status/changes:     \"I'm ok today. \"    OBJECTIVE    57 min Therapeutic Exercise:  [x] See flow sheet :   Rationale: increase ROM and increase strength to improve the patients ability to improve functional mobility         With   [x] TE   [] TA   [] neuro   [] other: Patient Education: [x] Review HEP    [] Progressed/Changed HEP based on:   [] positioning   [] body mechanics   [] transfers   [] heat/ice application    [] other:      Pain Level (0-10 scale) post treatment: 0/10    ASSESSMENT/Changes in Function: Patient tolerated treatment fairly well. No c/o pain during session. Pt continues to be limited in R knee flexion, but was able to increased numbers on Biodex by 1 degree today. Patient will continue to benefit from skilled PT services to address functional mobility deficits, address ROM deficits and address strength deficits to attain remaining goals. [x]  See Plan of Care  []  See progress note/recertification  []  See Discharge Summary         Progress towards goals / Updated goals:  Short Term Goals: To be accomplished in 6 treatments.   1.   Patient will demonstrate independence and compliance with HEP in order to assist with carryover from PT services.  Met  2.   Patient will be able to work in garden without reports of difficulty to increase functional mobility.  Met  3.   Patient will be able to play with her grandchildren outside without reports of difficulty to increase functional mobility.  Partially Met (haven't had the opportunity)  4.   Patient will be able to complete all ADLs without pain greater than or equal to 0/10 to increase functional mobility.  Met  5.   Patient will increase right knee flexion arom to 75 in order to improve gait.  Partially Met  6. Patient will be able to squat and  object off the floor with pain level of 5/10 with no difficulty. (New Goal 5/31/22)     Long Term Goals: To be accomplished in 12  treatments. 1.   Patient will be able to complete all household chores without pain greater than or equal to 0/10 to increase functional mobility. Met  2.   Patient will increase right knee flexion arom to 80 in order to improve gait.  Partially Met  3.   Patient will increase right knee extension arom to -15 in order to improve gait pattern.  Partially Met  4. Patient will be able to squat and  object off the floor with pain level of 5/10 with no difficulty. ( New Goal 5/31/22)    PLAN  [x]  Upgrade activities as tolerated     [x]  Continue plan of care  []  Update interventions per flow sheet       []  Discharge due to:_  []  Other:_      Ketty Dupont PTA, LPTA 6/2/2022

## 2022-06-08 ENCOUNTER — HOSPITAL ENCOUNTER (OUTPATIENT)
Dept: PHYSICAL THERAPY | Age: 57
Discharge: HOME | End: 2022-06-08
Payer: OTHER GOVERNMENT

## 2022-06-08 PROCEDURE — 97110 THERAPEUTIC EXERCISES: CPT

## 2022-06-08 NOTE — PROGRESS NOTES
PT DAILY TREATMENT NOTE - [facility] 2-15    Patient Name: Jhon Glez  Date:2022  : 1965  [x]  Patient  Verified  Payor: SHOSHANA / Plan: Jhonny Chaudhary 74 / Product Type:  /    In time:1:08 PM  Out time: 2:03 PM  Total Treatment Time (min): 55  Total Timed Codes (min): 55  1:1 Treatment Time ([facility] only): 55   Visit #:  16    Treatment Area: Pain in right knee [M25.561]  Other chronic pain [G89.29]  Presence of right artificial knee joint [Z96.651]  Iliotibial band tendonitis of right side [M76.31]    SUBJECTIVE  Pain Level (0-10 scale): 0/10  Any medication changes, allergies to medications, adverse drug reactions, diagnosis change, or new procedure performed?: [x] No    [] Yes (see summary sheet for update)    Subjective functional status/changes:     I can finally cross my legs. I have not been able to do that. OBJECTIVE    55 min Therapeutic Exercise:  [x] See flow sheet :   Rationale: increase ROM, increase strength, improve coordination, improve balance and increase proprioception to improve the patients ability to    increase right knee flexion arom to 75 in order to improve gait.          With   [] TE   [] TA   [] neuro   [] other: Patient Education: [x] Review HEP    [] Progressed/Changed HEP based on:   [] positioning   [] body mechanics   [] transfers   [] heat/ice application    [] other:      Other Objective/Functional Measures: AROM exercises to promote full flexion of the right  knee. ITB stretching in supine position. Strengthening exercises focused on right LE only. Pain Level (0-10 scale) post treatment: 0/10    ASSESSMENT/Changes in Function:   The pt tolerated treatment. ROM has increased to the point rachele is able to cross her knees. Exercises performed for right LE only to increase strength.    Patient will continue to benefit from skilled PT services to address functional mobility deficits, address ROM deficits, address strength deficits, analyze and address soft tissue restrictions and analyze and cue movement patterns to attain remaining goals. [x]  See Plan of Care  []  See progress note/recertification  []  See Discharge Summary         Progress towards goals / Updated goals:  Short Term Goals: To be accomplished in 6 treatments. 1.   Patient will demonstrate independence and compliance with HEP in order to assist with carryover from PT services.  Met  2.   Patient will be able to work in garden without reports of difficulty to increase functional mobility.  Met  3.   Patient will be able to play with her grandchildren outside without reports of difficulty to increase functional mobility.  Partially Met (haven't had the opportunity)  4.   Patient will be able to complete all ADLs without pain greater than or equal to 0/10 to increase functional mobility.  Met  5.   Patient will increase right knee flexion arom to 75 in order to improve gait.  Partially Met  6.   Patient will be able to squat and  object off the floor with pain level of 5/10 with no difficulty.  (New Goal 5/31/22)     Long Term Goals: To be accomplished in 12  treatments. 1.   Patient will be able to complete all household chores without pain greater than or equal to 0/10 to increase functional mobility. Met  2.   Patient will increase right knee flexion arom to 80 in order to improve gait.  Partially Met  3.   Patient will increase right knee extension arom to -15 in order to improve gait pattern.  Partially Met  4.   Patient will be able to squat and  object off the floor with pain level of 5/10 with no difficulty. ( New Goal 5/31/22)    PLAN  [x]  Upgrade activities as tolerated     [x]  Continue plan of care  []  Update interventions per flow sheet       []  Discharge due to:_  []  Other:_      Vaishali Fink, PT,  6/8/2022

## 2022-06-09 ENCOUNTER — APPOINTMENT (OUTPATIENT)
Dept: PHYSICAL THERAPY | Age: 57
End: 2022-06-09
Payer: OTHER GOVERNMENT

## 2022-06-10 ENCOUNTER — APPOINTMENT (OUTPATIENT)
Dept: PHYSICAL THERAPY | Age: 57
End: 2022-06-10
Payer: OTHER GOVERNMENT

## 2022-06-15 ENCOUNTER — HOSPITAL ENCOUNTER (OUTPATIENT)
Dept: PHYSICAL THERAPY | Age: 57
Discharge: HOME | End: 2022-06-15
Payer: OTHER GOVERNMENT

## 2022-06-15 PROCEDURE — 97110 THERAPEUTIC EXERCISES: CPT

## 2022-06-15 NOTE — PROGRESS NOTES
PT DAILY TREATMENT NOTE 2-15    Patient Name: Burt Rocha  Date:6/15/2022  : 1965  [x]  Patient  Verified  Payor:  / Plan: [facility]  [facility] REGION / Product Type:  /    In time: 1:06  Out time: 1:54  Total Treatment Time (min): 48  Visit #:  17    Treatment Area: Pain in right knee [M25.561]  Other chronic pain [G89.29]  Presence of right artificial knee joint [Z96.651]  Iliotibial band tendonitis of right side [M76.31]    SUBJECTIVE  Pain Level (0-10 scale): 0/10  Any medication changes, allergies to medications, adverse drug reactions, diagnosis change, or new procedure performed?: [x] No    [] Yes (see summary sheet for update)  Subjective functional status/changes:     \"I just have stiffness. \"    OBJECTIVE    48 min Therapeutic Exercise:  [x] See flow sheet :   Rationale: increase ROM and increase strength to improve the patients ability to improve functional mobility         With   [x] TE   [] TA   [] neuro   [] other: Patient Education: [x] Review HEP    [] Progressed/Changed HEP based on:   [] positioning   [] body mechanics   [] transfers   [] heat/ice application    [] other:      Other Objective/Functional Measures: Added ITB stretch in standing     Pain Level (0-10 scale) post treatment: 0/10    ASSESSMENT/Changes in Function: Patient tolerated treatment well. Pt continues to have no c/o pain during session. Biodex PROM into flexion has been about the same for the last 5 visits, ranging from 90-92 and not able to increase due to pt raising her hip. Patient will continue to benefit from skilled PT services to address functional mobility deficits, address ROM deficits and address strength deficits to attain remaining goals. [x]  See Plan of Care  []  See progress note/recertification  []  See Discharge Summary         Progress towards goals / Updated goals:  Short Term Goals: To be accomplished in 6 treatments.   1.   Patient will demonstrate independence and compliance with HEP in order to assist with carryover from PT services.  Met  2.   Patient will be able to work in garden without reports of difficulty to increase functional mobility.  Met  3.   Patient will be able to play with her grandchildren outside without reports of difficulty to increase functional mobility.  Partially Met (haven't had the opportunity)  4.   Patient will be able to complete all ADLs without pain greater than or equal to 0/10 to increase functional mobility.  Met  5.   Patient will increase right knee flexion arom to 75 in order to improve gait.  Partially Met  6.   Patient will be able to squat and  object off the floor with pain level of 5/10 with no difficulty.  (New Goal 5/31/22)     Long Term Goals: To be accomplished in 12  treatments. 1.   Patient will be able to complete all household chores without pain greater than or equal to 0/10 to increase functional mobility. Met  2.   Patient will increase right knee flexion arom to 80 in order to improve gait.  Partially Met  3.   Patient will increase right knee extension arom to -15 in order to improve gait pattern.  Partially Met  4.   Patient will be able to squat and  object off the floor with pain level of 5/10 with no difficulty. ( New Goal 5/31/22)    PLAN  [x]  Upgrade activities as tolerated     [x]  Continue plan of care  []  Update interventions per flow sheet       []  Discharge due to:_  []  Other:_      Shira Smith PTA, SABRINA 6/15/2022

## 2022-06-22 ENCOUNTER — APPOINTMENT (OUTPATIENT)
Dept: PHYSICAL THERAPY | Age: 57
End: 2022-06-22
Payer: OTHER GOVERNMENT

## 2022-06-24 ENCOUNTER — HOSPITAL ENCOUNTER (OUTPATIENT)
Dept: PHYSICAL THERAPY | Age: 57
Discharge: HOME | End: 2022-06-24
Payer: OTHER GOVERNMENT

## 2022-06-24 PROCEDURE — 97110 THERAPEUTIC EXERCISES: CPT

## 2022-06-24 NOTE — PROGRESS NOTES
PT DAILY TREATMENT NOTE 2-15    Patient Name: Koki Recio  Date:2022  : 1965  [x]  Patient  Verified  Payor: SHOSHANA / Plan: Jhonny Chaudhary [address] / Product Type:  /    In time:1:06  Out time:1:41  Total Treatment Time (min): 35  Visit #:  18    Treatment Area: Pain in right knee [M25.561]  Other chronic pain [G89.29]  Presence of right artificial knee joint [Z96.651]  Iliotibial band tendonitis of right side [M76.31]    SUBJECTIVE  Pain Level (0-10 scale): 0/10  Any medication changes, allergies to medications, adverse drug reactions, diagnosis change, or new procedure performed?: [x] No    [] Yes (see summary sheet for update)  Subjective functional status/changes:     \"I am doing good today and just feel a little stiff. \"    OBJECTIVE    35 min Therapeutic Exercise:  [x] See flow sheet :   Rationale: increase ROM, increase strength, improve coordination and improve balance to improve the patients ability to perform daily tasks with less issues. With   [x] TE   [] TA   [] neuro   [] other: Patient Education: [x] Review HEP    [] Progressed/Changed HEP based on:   [] positioning   [] body mechanics   [] transfers   [] heat/ice application    [] other:      Pain Level (0-10 scale) post treatment: 0/10    ASSESSMENT/Changes in Function:   Patient tolerated treatment well today. She still lacks significant knee flexion which is causing compensation at the hip level. We did a shortened session today due to patient needing to leave for another appointment. Patient will continue to benefit from skilled PT services to modify and progress therapeutic interventions, address functional mobility deficits, address ROM deficits, address strength deficits, analyze and address soft tissue restrictions, analyze and cue movement patterns, analyze and modify body mechanics/ergonomics and address imbalance/dizziness to attain remaining goals.      [x]  See Plan of Care  []  See progress note/recertification  []  See Discharge Summary         Progress towards goals / Updated goals:  Short Term Goals: To be accomplished in 6 treatments. 1.   Patient will demonstrate independence and compliance with HEP in order to assist with carryover from PT services.  Met  2.   Patient will be able to work in garden without reports of difficulty to increase functional mobility.  Met  3.   Patient will be able to play with her grandchildren outside without reports of difficulty to increase functional mobility.  Partially Met (haven't had the opportunity)  4.   Patient will be able to complete all ADLs without pain greater than or equal to 0/10 to increase functional mobility.  Met  5.   Patient will increase right knee flexion arom to 75 in order to improve gait.  Partially Met  6.   Patient will be able to squat and  object off the floor with pain level of 5/10 with no difficulty.  (New Goal 5/31/22)     Long Term Goals: To be accomplished in 12  treatments. 1.   Patient will be able to complete all household chores without pain greater than or equal to 0/10 to increase functional mobility. Met  2.   Patient will increase right knee flexion arom to 80 in order to improve gait.  Partially Met  3.   Patient will increase right knee extension arom to -15 in order to improve gait pattern.  Partially Met  4.   Patient will be able to squat and  object off the floor with pain level of 5/10 with no difficulty. ( New Goal 5/31/22)     PLAN  [x]  Upgrade activities as tolerated     [x]  Continue plan of care  []  Update interventions per flow sheet       []  Discharge due to:_  []  Other:_      Lawence Dakin, PT, DPT 6/24/2022

## 2022-07-01 ENCOUNTER — APPOINTMENT (OUTPATIENT)
Dept: PHYSICAL THERAPY | Age: 57
End: 2022-07-01

## 2022-11-18 ENCOUNTER — HOSPITAL ENCOUNTER (EMERGENCY)
Age: 57
Discharge: HOME | End: 2022-11-18
Attending: EMERGENCY MEDICINE
Payer: OTHER GOVERNMENT

## 2022-11-18 VITALS
HEART RATE: 73 BPM | TEMPERATURE: 97.9 F | HEIGHT: 60 IN | BODY MASS INDEX: 31.22 KG/M2 | SYSTOLIC BLOOD PRESSURE: 167 MMHG | WEIGHT: 159 LBS | DIASTOLIC BLOOD PRESSURE: 93 MMHG | RESPIRATION RATE: 18 BRPM | OXYGEN SATURATION: 100 %

## 2022-11-18 DIAGNOSIS — M79.605 LEFT LEG PAIN: Primary | ICD-10-CM

## 2022-11-18 PROCEDURE — 99283 EMERGENCY DEPT VISIT LOW MDM: CPT

## 2022-11-18 PROCEDURE — 74011250637 HC RX REV CODE- 250/637: Performed by: EMERGENCY MEDICINE

## 2022-11-18 RX ORDER — METHOCARBAMOL 500 MG/1
500 TABLET, FILM COATED ORAL
Qty: 15 TABLET | Refills: 0 | Status: SHIPPED | OUTPATIENT
Start: 2022-11-18 | End: 2022-11-18 | Stop reason: SDUPTHER

## 2022-11-18 RX ORDER — ACETAMINOPHEN 500 MG
1000 TABLET ORAL EVERY 8 HOURS
Qty: 20 TABLET | Refills: 0 | Status: SHIPPED | OUTPATIENT
Start: 2022-11-18 | End: 2022-11-18 | Stop reason: SDUPTHER

## 2022-11-18 RX ORDER — ACETAMINOPHEN 500 MG
1000 TABLET ORAL EVERY 8 HOURS
Qty: 20 TABLET | Refills: 0 | Status: SHIPPED | OUTPATIENT
Start: 2022-11-18 | End: 2022-11-25

## 2022-11-18 RX ORDER — HYDROCODONE BITARTRATE AND ACETAMINOPHEN 5; 325 MG/1; MG/1
2 TABLET ORAL ONCE
Status: COMPLETED | OUTPATIENT
Start: 2022-11-18 | End: 2022-11-18

## 2022-11-18 RX ORDER — METHOCARBAMOL 500 MG/1
500 TABLET, FILM COATED ORAL
Qty: 15 TABLET | Refills: 0 | Status: SHIPPED | OUTPATIENT
Start: 2022-11-18 | End: 2022-11-23

## 2022-11-18 RX ORDER — METHOCARBAMOL 500 MG/1
500 TABLET, FILM COATED ORAL ONCE
Status: COMPLETED | OUTPATIENT
Start: 2022-11-18 | End: 2022-11-18

## 2022-11-18 RX ADMIN — HYDROCODONE BITARTRATE AND ACETAMINOPHEN 2 TABLET: 5; 325 TABLET ORAL at 12:48

## 2022-11-18 RX ADMIN — METHOCARBAMOL TABLETS 500 MG: 500 TABLET, COATED ORAL at 12:48

## 2022-11-18 NOTE — ED PROVIDER NOTES
EMERGENCY DEPARTMENT HISTORY AND PHYSICAL EXAM      Date: 11/18/2022  Patient Name: Katy Moreno    History of Presenting Illness     Chief Complaint   Patient presents with    Leg Pain       History Provided By: Patient    HPI: Katy Moreno, 65 y.o. female presenting with left leg pain. This has been ongoing for the past 3 days. No numbness, weakness, bowel or bladder incontinence. No recent falls or traumas. She states she is unable to bear weight on her leg due to the pain. There are no other complaints, changes, or physical findings at this time. PCP: Ally Kendall    No current facility-administered medications on file prior to encounter. Current Outpatient Medications on File Prior to Encounter   Medication Sig Dispense Refill    acetaminophen (TYLENOL) 325 mg tablet Take 3 Tablets by mouth every eight (8) hours as needed for Pain. Indications: pain 30 Tablet 0    ondansetron (Zofran ODT) 4 mg disintegrating tablet Take 1 Tablet by mouth every six (6) hours as needed for Nausea or Vomiting. Indications: prevent nausea and vomiting after surgery 20 Tablet 0    cholecalciferol, vitamin D3, (Vitamin D3) 50 mcg (2,000 unit) tab Take 1 Tablet by mouth daily. MITCH EXTRACT PO Take 1 Tablet by mouth daily. fish oil-omega-3 fatty acids (Fish Oil) 300-500 mg cap Take 1 Caplet by mouth every other day. multivit/folic acid/vit K1 (ONE-A-DAY WOMEN'S 50 PLUS PO) Take 1 Tablet by mouth daily. amLODIPine (NORVASC) 5 mg tablet Take 5 mg by mouth daily.          Past History     Past Medical History:  Past Medical History:   Diagnosis Date    Arthritis     Hypertension     Knee pain, right     X 4-5 years       Past Surgical History:  Past Surgical History:   Procedure Laterality Date    HX CARPAL TUNNEL RELEASE Right     aug 2020    HX ORTHOPAEDIC      Right knee, arthosocpy and cartilage removal    HX TOTAL ABDOMINAL HYSTERECTOMY      HX TUBAL LIGATION         Family History:  Family History   Problem Relation Age of Onset    Hypertension Mother     Diabetes Mother     Hypertension Father        Social History:  Social History     Tobacco Use    Smoking status: Never    Smokeless tobacco: Never   Substance Use Topics    Alcohol use: Never       Allergies: Allergies   Allergen Reactions    Ibuprofen Other (comments)     Pt reports it makes her \"jittery\"       Review of Systems   Review of Systems   Constitutional:  Negative for chills and fever. HENT:  Negative for sore throat. Eyes:  Negative for redness. Respiratory:  Negative for shortness of breath. Cardiovascular:  Negative for chest pain. Gastrointestinal:  Negative for abdominal pain, nausea and vomiting. Genitourinary:  Negative for flank pain. Musculoskeletal:  Negative for myalgias. Skin:  Negative for rash. Neurological:  Negative for headaches. Physical Exam   Physical Exam  Vitals and nursing note reviewed. Constitutional:       General: She is not in acute distress. Appearance: Normal appearance. HENT:      Head: Normocephalic and atraumatic. Mouth/Throat:      Mouth: Mucous membranes are moist.   Eyes:      Conjunctiva/sclera: Conjunctivae normal.   Pulmonary:      Effort: Pulmonary effort is normal. No respiratory distress. Musculoskeletal:      Cervical back: Normal range of motion. Comments: No midline cervical, thoracic or lumbar tenderness to palpation  No deformity, warmth, swelling to the leg. Tender primarily to the posterior upper thigh of the left   Skin:     General: Skin is warm and dry. Neurological:      General: No focal deficit present. Mental Status: She is alert and oriented to person, place, and time. Mental status is at baseline. Lab and Diagnostic Study Results   Labs -   No results found for this or any previous visit (from the past 12 hour(s)).     Radiologic Studies -   @lastxrresult@  CT Results  (Last 48 hours)      None CXR Results  (Last 48 hours)      None            Medical Decision Making and ED Course   Differential Diagnosis & Medical Decision Making Provider Note:   Patient with nontraumatic left leg pain. No indication for imaging. Appears radicular in nature. No edema or vascular insufficiency. Pain controlled oral medication. Will discharge with prescriptions for pain control and advised follow-up with PCP    - I am the first provider for this patient. I reviewed the vital signs, available nursing notes, past medical history, past surgical history, family history and social history. The patients presenting problems have been discussed, and they are in agreement with the care plan formulated and outlined with them. I have encouraged them to ask questions as they arise throughout their visit. Vital Signs-Reviewed the patient's vital signs. Patient Vitals for the past 12 hrs:   Temp Pulse Resp BP SpO2   11/18/22 1215 97.9 °F (36.6 °C) 73 18 (!) 167/93 100 %       ED Course:             Disposition   Disposition: DC- Adult Discharges: All of the diagnostic tests were reviewed and questions answered. Diagnosis, care plan and treatment options were discussed. The patient understands the instructions and will follow up as directed. The patients results have been reviewed with them. They have been counseled regarding their diagnosis. The patient verbally convey understanding and agreement of the signs, symptoms, diagnosis, treatment and prognosis and additionally agrees to follow up as recommended with their PCP in 24 - 48 hours. They also agree with the care-plan and convey that all of their questions have been answered.   I have also put together some discharge instructions for them that include: 1) educational information regarding their diagnosis, 2) how to care for their diagnosis at home, as well a 3) list of reasons why they would want to return to the ED prior to their follow-up appointment, should their condition change. DISCHARGE PLAN:  1. Current Discharge Medication List        CONTINUE these medications which have NOT CHANGED    Details   acetaminophen (TYLENOL) 325 mg tablet Take 3 Tablets by mouth every eight (8) hours as needed for Pain. Indications: pain  Qty: 30 Tablet, Refills: 0      ondansetron (Zofran ODT) 4 mg disintegrating tablet Take 1 Tablet by mouth every six (6) hours as needed for Nausea or Vomiting. Indications: prevent nausea and vomiting after surgery  Qty: 20 Tablet, Refills: 0      cholecalciferol, vitamin D3, (Vitamin D3) 50 mcg (2,000 unit) tab Take 1 Tablet by mouth daily. MITCH EXTRACT PO Take 1 Tablet by mouth daily. fish oil-omega-3 fatty acids (Fish Oil) 300-500 mg cap Take 1 Caplet by mouth every other day. multivit/folic acid/vit K1 (ONE-A-DAY WOMEN'S 50 PLUS PO) Take 1 Tablet by mouth daily. amLODIPine (NORVASC) 5 mg tablet Take 5 mg by mouth daily. 2.   Follow-up Information       Follow up With Specialties Details Why [address] [place] [address] Internal Medicine Physician   [address] [place] [address] Drive  [phone]            3. Return to ED if worse   4. Discharge Medication List as of 11/18/2022  1:30 PM        CONTINUE these medications which have CHANGED    Details   !! acetaminophen (Tylenol Extra Strength) 500 mg tablet Take 2 Tablets by mouth every eight (8) hours for 7 days. , Normal, Disp-20 Tablet, R-0      methocarbamoL (ROBAXIN) 500 mg tablet Take 1 Tablet by mouth four (4) times daily as needed for Muscle Spasm(s) for up to 5 days. , Normal, Disp-15 Tablet, R-0       !! - Potential duplicate medications found. Please discuss with provider. CONTINUE these medications which have NOT CHANGED    Details   !! acetaminophen (TYLENOL) 325 mg tablet Take 3 Tablets by mouth every eight (8) hours as needed for Pain.  Indications: pain, No Print, Disp-30 Tablet, R-0      ondansetron (Zofran ODT) 4 mg disintegrating tablet Take 1 Tablet by mouth every six (6) hours as needed for Nausea or Vomiting. Indications: prevent nausea and vomiting after surgery, Normal, Disp-20 Tablet, R-0      cholecalciferol, vitamin D3, (Vitamin D3) 50 mcg (2,000 unit) tab Take 1 Tablet by mouth daily. , Historical Med      MITCH EXTRACT PO Take 1 Tablet by mouth daily. , Historical Med      fish oil-omega-3 fatty acids (Fish Oil) 300-500 mg cap Take 1 Caplet by mouth every other day., Historical Med      multivit/folic acid/vit K1 (ONE-A-DAY WOMEN'S 50 PLUS PO) Take 1 Tablet by mouth daily. , Historical Med      amLODIPine (NORVASC) 5 mg tablet Take 5 mg by mouth daily. , Historical Med       !! - Potential duplicate medications found. Please discuss with provider. Diagnosis/Clinical Impression     Clinical Impression:   1. Left leg pain        Attestations: I, Alex Ortiz MD, am the primary clinician of record. Please note that this dictation was completed with [employer], the computer voice recognition software. Quite often unanticipated grammatical, syntax, homophones, and other interpretive errors are inadvertently transcribed by the computer software. Please disregard these errors. Please excuse any errors that have escaped final proofreading. Thank you.

## 2022-12-21 ENCOUNTER — HOSPITAL ENCOUNTER (EMERGENCY)
Age: 57
Discharge: HOME | End: 2022-12-21
Attending: EMERGENCY MEDICINE
Payer: OTHER GOVERNMENT

## 2022-12-21 VITALS
SYSTOLIC BLOOD PRESSURE: 173 MMHG | OXYGEN SATURATION: 100 % | RESPIRATION RATE: 18 BRPM | DIASTOLIC BLOOD PRESSURE: 94 MMHG | HEART RATE: 69 BPM | BODY MASS INDEX: 31.22 KG/M2 | WEIGHT: 159 LBS | TEMPERATURE: 97.9 F | HEIGHT: 60 IN

## 2022-12-21 DIAGNOSIS — L50.9 URTICARIA: Primary | ICD-10-CM

## 2022-12-21 PROCEDURE — 74011250636 HC RX REV CODE- 250/636: Performed by: EMERGENCY MEDICINE

## 2022-12-21 PROCEDURE — 99284 EMERGENCY DEPT VISIT MOD MDM: CPT

## 2022-12-21 PROCEDURE — 96372 THER/PROPH/DIAG INJ SC/IM: CPT

## 2022-12-21 RX ORDER — CAMPHOR 0.45 %
GEL (GRAM) TOPICAL
Qty: 103 ML | Refills: 0 | Status: SHIPPED | OUTPATIENT
Start: 2022-12-21

## 2022-12-21 RX ORDER — HYDROXYZINE PAMOATE 25 MG/1
25 CAPSULE ORAL
Qty: 45 CAPSULE | Refills: 0 | Status: SHIPPED | OUTPATIENT
Start: 2022-12-21 | End: 2023-01-05

## 2022-12-21 RX ORDER — DIPHENHYDRAMINE HYDROCHLORIDE 50 MG/ML
50 INJECTION, SOLUTION INTRAMUSCULAR; INTRAVENOUS
Status: DISCONTINUED | OUTPATIENT
Start: 2022-12-21 | End: 2022-12-21 | Stop reason: HOSPADM

## 2022-12-21 RX ORDER — TRIAMCINOLONE ACETONIDE 1 MG/G
CREAM TOPICAL 2 TIMES DAILY
Qty: 15 G | Refills: 0 | Status: SHIPPED | OUTPATIENT
Start: 2022-12-21

## 2022-12-21 RX ADMIN — METHYLPREDNISOLONE SODIUM SUCCINATE 125 MG: 125 INJECTION, POWDER, FOR SOLUTION INTRAMUSCULAR; INTRAVENOUS at 11:04

## 2022-12-21 NOTE — ED PROVIDER NOTES
EMERGENCY DEPARTMENT HISTORY AND PHYSICAL EXAM      Date: 12/21/2022  Patient Name: Jim Bee    History of Presenting Illness     Chief Complaint   Patient presents with    Insect Bite       History Provided By: Patient    HPI: Jim Bee, 65 y.o. female past medical history significant for hypertension, patient presents with 3 days of rash to left arm, patient states that she went to facility to play card game and came away with these itchy lesions, patient denies rash formation to any other parts of her body    There are no other complaints, changes, or physical findings at this time. Past History     Past Medical History:  Past Medical History:   Diagnosis Date    Arthritis     Hypertension     Knee pain, right     X 4-5 years       Past Surgical History:  Past Surgical History:   Procedure Laterality Date    HX CARPAL TUNNEL RELEASE Right     aug 2020    HX ORTHOPAEDIC      Right knee, arthosocpy and cartilage removal    HX TOTAL ABDOMINAL HYSTERECTOMY      HX TUBAL LIGATION         Family History:  Family History   Problem Relation Age of Onset    Hypertension Mother     Diabetes Mother     Hypertension Father        Social History:  Social History     Tobacco Use    Smoking status: Never    Smokeless tobacco: Never   Substance Use Topics    Alcohol use: Never       Allergies: Allergies   Allergen Reactions    Ibuprofen Other (comments)     Pt reports it makes her \"jittery\"       PCP: Jairon Garg    No current facility-administered medications on file prior to encounter. Current Outpatient Medications on File Prior to Encounter   Medication Sig Dispense Refill    acetaminophen (TYLENOL) 325 mg tablet Take 3 Tablets by mouth every eight (8) hours as needed for Pain. Indications: pain 30 Tablet 0    ondansetron (Zofran ODT) 4 mg disintegrating tablet Take 1 Tablet by mouth every six (6) hours as needed for Nausea or Vomiting.  Indications: prevent nausea and vomiting after surgery 20 Tablet 0    cholecalciferol, vitamin D3, (Vitamin D3) 50 mcg (2,000 unit) tab Take 1 Tablet by mouth daily. MITCH EXTRACT PO Take 1 Tablet by mouth daily. fish oil-omega-3 fatty acids (Fish Oil) 300-500 mg cap Take 1 Caplet by mouth every other day. multivit/folic acid/vit K1 (ONE-A-DAY WOMEN'S 50 PLUS PO) Take 1 Tablet by mouth daily. amLODIPine (NORVASC) 5 mg tablet Take 5 mg by mouth daily. Review of Systems   Review of Systems   Constitutional:  Negative for chills and fever. HENT:  Negative for rhinorrhea and sore throat. Eyes:  Negative for pain and visual disturbance. Respiratory:  Negative for cough and shortness of breath. Cardiovascular:  Negative for chest pain and leg swelling. Gastrointestinal:  Negative for abdominal pain and vomiting. Endocrine: Negative for polydipsia and polyuria. Genitourinary:  Negative for dysuria and hematuria. Musculoskeletal:  Negative for back pain and neck pain. Skin:  Positive for color change and rash. Negative for pallor. Neurological:  Negative for weakness and headaches. Psychiatric/Behavioral:  Negative for agitation and suicidal ideas. Physical Exam   Physical Exam  Vitals and nursing note reviewed. Constitutional:       General: She is not in acute distress. Appearance: She is not ill-appearing, toxic-appearing or diaphoretic. HENT:      Head: Normocephalic and atraumatic. Right Ear: Tympanic membrane normal.      Left Ear: Tympanic membrane normal.      Nose: Nose normal. No congestion. Mouth/Throat:      Mouth: Mucous membranes are moist.      Pharynx: Oropharynx is clear. Eyes:      Extraocular Movements: Extraocular movements intact. Conjunctiva/sclera: Conjunctivae normal.      Pupils: Pupils are equal, round, and reactive to light. Cardiovascular:      Rate and Rhythm: Normal rate and regular rhythm. Pulses: Normal pulses. Heart sounds: Normal heart sounds.    Pulmonary: Effort: Pulmonary effort is normal.      Breath sounds: Normal breath sounds. Abdominal:      General: Bowel sounds are normal.      Palpations: Abdomen is soft. Tenderness: There is no abdominal tenderness. Musculoskeletal:         General: No tenderness, deformity or signs of injury. Normal range of motion. Cervical back: Normal range of motion and neck supple. No rigidity or tenderness. Lymphadenopathy:      Cervical: No cervical adenopathy. Skin:     General: Skin is warm and dry. Capillary Refill: Capillary refill takes less than 2 seconds. Findings: Erythema, lesion and rash present. Rash is urticarial.      Comments: Rash lesions present only to left upper extremity   Neurological:      General: No focal deficit present. Mental Status: She is alert and oriented to person, place, and time. Cranial Nerves: No cranial nerve deficit. Sensory: No sensory deficit. Psychiatric:         Mood and Affect: Mood normal.         Behavior: Behavior normal.       Lab and Diagnostic Study Results   Labs -   No results found for this or any previous visit (from the past 12 hour(s)). Radiologic Studies -   @lastxrresult@  CT Results  (Last 48 hours)      None          CXR Results  (Last 48 hours)      None            Medical Decision Making and ED Course   Differential Diagnosis & Medical Decision Making Provider Note:   Rash DDx cellulitis, abscess formation, allergic reaction    - I am the first provider for this patient. I reviewed the vital signs, available nursing notes, past medical history, past surgical history, family history and social history. The patients presenting problems have been discussed, and they are in agreement with the care plan formulated and outlined with them. I have encouraged them to ask questions as they arise throughout their visit. Vital Signs-Reviewed the patient's vital signs.   Patient Vitals for the past 12 hrs:   Temp Pulse Resp BP SpO2   12/21/22 1036 97.9 °F (36.6 °C) -- -- -- --   12/21/22 1035 -- 69 18 (!) 173/94 100 %       ED Course:          Procedures   Performed by: Josey Schrader MD  Procedures      Disposition   Disposition: Condition stable  DC- Adult Discharges: All of the diagnostic tests were reviewed and questions answered. Diagnosis, care plan and treatment options were discussed. The patient understands the instructions and will follow up as directed. The patients results have been reviewed with them. They have been counseled regarding their diagnosis. The patient verbally convey understanding and agreement of the signs, symptoms, diagnosis, treatment and prognosis and additionally agrees to follow up as recommended with their PCP in 24 - 48 hours. They also agree with the care-plan and convey that all of their questions have been answered. I have also put together some discharge instructions for them that include: 1) educational information regarding their diagnosis, 2) how to care for their diagnosis at home, as well a 3) list of reasons why they would want to return to the ED prior to their follow-up appointment, should their condition change. DISCHARGE PLAN:  1. Current Discharge Medication List        CONTINUE these medications which have NOT CHANGED    Details   acetaminophen (TYLENOL) 325 mg tablet Take 3 Tablets by mouth every eight (8) hours as needed for Pain. Indications: pain  Qty: 30 Tablet, Refills: 0      ondansetron (Zofran ODT) 4 mg disintegrating tablet Take 1 Tablet by mouth every six (6) hours as needed for Nausea or Vomiting. Indications: prevent nausea and vomiting after surgery  Qty: 20 Tablet, Refills: 0      cholecalciferol, vitamin D3, (Vitamin D3) 50 mcg (2,000 unit) tab Take 1 Tablet by mouth daily. MITCH EXTRACT PO Take 1 Tablet by mouth daily. fish oil-omega-3 fatty acids (Fish Oil) 300-500 mg cap Take 1 Caplet by mouth every other day.       multivit/folic acid/vit K1 (ONE-A-DAY WOMEN'S 50 PLUS PO) Take 1 Tablet by mouth daily. amLODIPine (NORVASC) 5 mg tablet Take 5 mg by mouth daily. 2.   Follow-up Information    None       3. Return to ED if worse   4. Current Discharge Medication List         Remove if admitted/transferred    Diagnosis/Clinical Impression     Clinical Impression: No diagnosis found. Attestations: Rubén HUGHES MD, am the primary clinician of record. Please note that this dictation was completed with [employer], the computer voice recognition software. Quite often unanticipated grammatical, syntax, homophones, and other interpretive errors are inadvertently transcribed by the computer software. Please disregard these errors. Please excuse any errors that have escaped final proofreading. Thank you.

## 2022-12-21 NOTE — ED TRIAGE NOTES
Started Sunday with feeling bite to left back of arm and has raised red areas to back of arm that are itchy,

## 2022-12-23 ENCOUNTER — OFFICE VISIT (OUTPATIENT)
Dept: SURGERY | Age: 57
End: 2022-12-23
Payer: OTHER GOVERNMENT

## 2022-12-23 VITALS
HEART RATE: 64 BPM | RESPIRATION RATE: 16 BRPM | BODY MASS INDEX: 32.49 KG/M2 | HEIGHT: 60 IN | DIASTOLIC BLOOD PRESSURE: 88 MMHG | OXYGEN SATURATION: 96 % | SYSTOLIC BLOOD PRESSURE: 150 MMHG | TEMPERATURE: 98.1 F | WEIGHT: 165.5 LBS

## 2022-12-23 DIAGNOSIS — M25.561 RIGHT KNEE PAIN, UNSPECIFIED CHRONICITY: ICD-10-CM

## 2022-12-23 DIAGNOSIS — I73.9 PERIPHERAL VASCULAR DISEASE (HCC): ICD-10-CM

## 2022-12-23 DIAGNOSIS — M17.11 OSTEOARTHRITIS OF RIGHT KNEE, UNSPECIFIED OSTEOARTHRITIS TYPE: Primary | ICD-10-CM

## 2022-12-23 PROCEDURE — 99203 OFFICE O/P NEW LOW 30 MIN: CPT | Performed by: SURGERY

## 2022-12-23 RX ORDER — MELOXICAM 15 MG/1
15 TABLET ORAL DAILY
COMMUNITY
Start: 2022-11-30

## 2022-12-23 RX ORDER — OMEPRAZOLE 20 MG/1
1 CAPSULE, DELAYED RELEASE ORAL DAILY
COMMUNITY
Start: 2022-10-05

## 2022-12-23 NOTE — PROGRESS NOTES
Identified pt with two pt identifiers (name and ). Reviewed chart in preparation for visit and have obtained necessary documentation.     Talia Bernabe is a 65 y.o. female  Chief Complaint   Patient presents with    New Patient    Leg Pain     Left     Leg Swelling     Left      Visit Vitals  BP (!) 150/88 (BP 1 Location: Left upper arm, BP Patient Position: Sitting)   Pulse 64   Temp 98.1 °F (36.7 °C) (Temporal)   Resp 16   Ht 5' (1.524 m)   Wt 165 lb 8 oz (75.1 kg)   SpO2 96%   BMI 32.32 kg/m²

## 2022-12-28 PROBLEM — I73.9 PERIPHERAL VASCULAR DISEASE (HCC): Status: ACTIVE | Noted: 2022-12-28

## 2022-12-28 NOTE — PROGRESS NOTES
Vascular History and Physical    Patient: Mirta Mauricio  MRN: [id number]    YOB: 1965  Age: 65 y.o. Sex: female     Chief Complaint:  Chief Complaint   Patient presents with    New Patient    Leg Pain     Left     Leg Swelling     Left        History of Present Illness: Mirta Mauricio is a 65 y.o. very pleasant woman is here today for vascular assessment. She is agreeable for orthopedic service because of leg pain. Patient does have arthritis on the right knee. Right leg pain is worse. Pain is worse with ambulation. Patient denies history of deep vein thrombosis. Patient does have mild swelling. Patient denies chest pain shortness of breath. Patient also complains left leg pain as well as swelling. Patient wearing compression stocking for that. It appears the swelling is improved with compression stocking. Social History:  Social Connections: Not on file       Past Medical History:  Past Medical History:   Diagnosis Date    Arthritis     Hypertension     Knee pain, right     X 4-5 years       Surgical History:  Past Surgical History:   Procedure Laterality Date    HX CARPAL TUNNEL RELEASE Right     aug 2020    HX ORTHOPAEDIC      Right knee, arthosocpy and cartilage removal    HX TOTAL ABDOMINAL HYSTERECTOMY      HX TUBAL LIGATION         Allergies: Allergies   Allergen Reactions    Ibuprofen Other (comments)     Pt reports it makes her \"jittery\"       Current Meds:  Current Outpatient Medications   Medication Sig Dispense Refill    meloxicam (MOBIC) 15 mg tablet Take 15 mg by mouth daily. omeprazole (PRILOSEC) 20 mg capsule Take 1 Capsule by mouth daily. diphenhydrAMINE hcl (BenadryL) 2 % topical gel Apply  to affected area every six (6) hours as needed for Skin Irritation or Itching. 103 mL 0    hydrOXYzine pamoate (VistariL) 25 mg capsule Take 1 Capsule by mouth three (3) times daily as needed for Anxiety for up to 15 days.  45 Capsule 0    triamcinolone acetonide (KENALOG) 0.1 % topical cream Apply  to affected area two (2) times a day. use thin layer 15 g 0    acetaminophen (TYLENOL) 325 mg tablet Take 3 Tablets by mouth every eight (8) hours as needed for Pain. Indications: pain 30 Tablet 0    ondansetron (Zofran ODT) 4 mg disintegrating tablet Take 1 Tablet by mouth every six (6) hours as needed for Nausea or Vomiting. Indications: prevent nausea and vomiting after surgery 20 Tablet 0    cholecalciferol, vitamin D3, 50 mcg (2,000 unit) tab Take 1 Tablet by mouth daily. MITCH EXTRACT PO Take 1 Tablet by mouth daily. fish oil-omega-3 fatty acids (Fish Oil) 300-500 mg cap Take 1 Caplet by mouth every other day. multivit/folic acid/vit K1 (ONE-A-DAY WOMEN'S 50 PLUS PO) Take 1 Tablet by mouth daily. amLODIPine (NORVASC) 5 mg tablet Take 5 mg by mouth daily. Review of Systems:  I reviewed the rest of organ systems personally and they are negative. Pertinent findings discussed in HPI.     Physical Examination    Visit Vitals  BP (!) 150/88 (BP 1 Location: Left upper arm, BP Patient Position: Sitting)   Pulse 64   Temp 98.1 °F (36.7 °C) (Temporal)   Resp 16   Ht 5' (1.524 m)   Wt 165 lb 8 oz (75.1 kg)   SpO2 96%   BMI 32.32 kg/m²     Gen: Well developed, well nourished 65 y.o. female in no acute distress  Head: normocephalic, atraumatic  Mouth: Clear, no overt lesions, oral mucosa pink and moist  Neck: supple, no masses, no adenopathy or carotid bruits, trachea midline  Resp: clear to auscultation bilaterally, no wheeze, rhonchi or rales, excursions normal and symmetrical  Cardio: Regular rate and rhythm, no murmurs, clicks, gallops or rubs, no edema or varicosities  Abdomen: soft, nontender, nondistended, normoactive bowel sounds, no hernias, no hepatosplenomegaly   Neuro: sensation and strength grossly intact and symmetrical  Psych: alert and oriented to person, place and time  Vascular examination: Vascular examination shows nonpalpable pedal pulse noted.  Biphasic signal noted on DP and PT. Swelling is mild to moderate. She has a CEAP C3 venous disorder. Skin: warm and moist.    Labs:  No visits with results within 1 Month(s) from this visit. Latest known visit with results is:   Admission on 06/28/2021, Discharged on 06/29/2021   Component Date Value Ref Range Status    Special Requests: 06/28/2021 No Special Requests    Final    Culture result: 06/28/2021 No Growth (<1000 cfu/mL)    Final    Sodium 06/29/2021 140  136 - 145 mmol/L Final    Potassium 06/29/2021 4.8  3.5 - 5.1 mmol/L Final    Chloride 06/29/2021 108  97 - 108 mmol/L Final    CO2 06/29/2021 27  21 - 32 mmol/L Final    Anion gap 06/29/2021 5  5 - 15 mmol/L Final    Glucose 06/29/2021 109 (A)  65 - 100 mg/dL Final    BUN 06/29/2021 10  6 - 20 mg/dL Final    Creatinine 06/29/2021 0.52 (A)  0.55 - 1.02 mg/dL Final    BUN/Creatinine ratio 06/29/2021 19  12 - 20   Final    GFR est AA 06/29/2021 >60  >60 ml/min/1.73m2 Final    GFR est non-AA 06/29/2021 >60  >60 ml/min/1.73m2 Final    Comment: Estimated GFR is calculated using the IDMS-traceable Modification of Diet in Renal Disease (MDRD) Study equation, reported for both  Americans (GFRAA) and non- Americans (GFRNA), and normalized to 1.73m2 body surface area. The physician must decide which value applies to the patient. The MDRD study equation should only be used in individuals age 25 or older. It has not been validated for the following: pregnant women, patients with serious comorbid conditions, or on certain medications, or persons with extremes of body size, muscle mass, or nutritional status. Calcium 06/29/2021 8.8  8.5 - 10.1 mg/dL Final    HGB 06/29/2021 12.0  11.5 - 16.0 g/dL Final         Images:  No images are attached to the encounter.     Jessica Jeff MD    Assessments:  Patient Active Problem List   Diagnosis Code    Osteoarthritis of right knee M17.11    Knee pain, right M25.561    Peripheral vascular disease [facility]) I73.9       Plans: I will arrange further imaging tests including venous duplex ultrasound and as well as lower extremity arterial PVR with NEELA examination. Patient will be reassessed in 2 weeks after these 2 tests and make further recommendation.           Tru Salazar MD

## 2023-01-04 ENCOUNTER — HOSPITAL ENCOUNTER (OUTPATIENT)
Dept: VASCULAR SURGERY | Age: 58
Discharge: HOME | End: 2023-01-04
Attending: SURGERY
Payer: OTHER GOVERNMENT

## 2023-01-04 VITALS — HEIGHT: 60 IN | BODY MASS INDEX: 32.39 KG/M2 | WEIGHT: 165 LBS

## 2023-01-04 DIAGNOSIS — I73.9 PERIPHERAL VASCULAR DISEASE (HCC): ICD-10-CM

## 2023-01-04 DIAGNOSIS — M25.561 RIGHT KNEE PAIN, UNSPECIFIED CHRONICITY: ICD-10-CM

## 2023-01-04 LAB
LEFT ABI: 1.21
LEFT ARM BP: 156 MMHG
LEFT CALF PRESSURE: 188 MMHG
LEFT HIGH THIGH PRESSURE: 220 MMHG
LEFT LOW THIGH PRESSURE: 219 MMHG
LEFT POSTERIOR TIBIAL: 184 MMHG
LEFT TBI: 0.91
LEFT TOE PRESSURE: 145 MMHG
RIGHT ABI: 1.26
RIGHT ARM BP: 159 MMHG
RIGHT CALF PRESSURE: 197 MMHG
RIGHT HIGH THIGH PRESSURE: 214 MMHG
RIGHT LOW THIGH PRESSURE: 212 MMHG
RIGHT POSTERIOR TIBIAL: 184 MMHG
RIGHT TBI: 0.81
RIGHT TOE PRESSURE: 128 MMHG
VAS LEFT ANKLE BP: 192 MMHG
VAS LEFT DORSALIS PEDIS BP: 192 MMHG
VAS RIGHT ANKLE BP: 200 MMHG
VAS RIGHT DORSALIS PEDIS BP: 200 MMHG

## 2023-01-04 PROCEDURE — 93923 UPR/LXTR ART STDY 3+ LVLS: CPT

## 2023-01-04 PROCEDURE — 93970 EXTREMITY STUDY: CPT

## 2023-01-09 ENCOUNTER — OFFICE VISIT (OUTPATIENT)
Dept: SURGERY | Age: 58
End: 2023-01-09
Payer: OTHER GOVERNMENT

## 2023-01-09 VITALS
BODY MASS INDEX: 31.12 KG/M2 | SYSTOLIC BLOOD PRESSURE: 149 MMHG | HEIGHT: 60 IN | WEIGHT: 158.5 LBS | OXYGEN SATURATION: 99 % | TEMPERATURE: 97.7 F | DIASTOLIC BLOOD PRESSURE: 73 MMHG | HEART RATE: 71 BPM | RESPIRATION RATE: 18 BRPM

## 2023-01-09 DIAGNOSIS — I73.9 PERIPHERAL VASCULAR DISEASE (HCC): Primary | ICD-10-CM

## 2023-01-09 PROCEDURE — 99213 OFFICE O/P EST LOW 20 MIN: CPT | Performed by: SURGERY

## 2023-01-09 RX ORDER — TERCONAZOLE 4 MG/G
CREAM VAGINAL
COMMUNITY

## 2023-01-09 RX ORDER — IBUPROFEN 800 MG/1
TABLET ORAL
COMMUNITY

## 2023-01-09 RX ORDER — ESTRADIOL 0.1 MG/G
CREAM VAGINAL
COMMUNITY
Start: 2022-10-06

## 2023-01-09 RX ORDER — GABAPENTIN 300 MG/1
CAPSULE ORAL
COMMUNITY

## 2023-01-09 RX ORDER — OXYCODONE AND ACETAMINOPHEN 5; 325 MG/1; MG/1
TABLET ORAL
COMMUNITY

## 2023-01-09 RX ORDER — NAPROXEN 500 MG/1
TABLET ORAL
COMMUNITY

## 2023-01-09 RX ORDER — ASPIRIN 81 MG/1
81 TABLET ORAL DAILY
COMMUNITY

## 2023-01-09 RX ORDER — PREDNISONE 10 MG/1
TABLET ORAL
COMMUNITY

## 2023-01-09 RX ORDER — LISINOPRIL 20 MG/1
TABLET ORAL
COMMUNITY

## 2023-01-09 RX ORDER — HYDROCODONE BITARTRATE AND ACETAMINOPHEN 5; 325 MG/1; MG/1
TABLET ORAL
COMMUNITY

## 2023-01-09 RX ORDER — OXYCODONE AND ACETAMINOPHEN 10; 325 MG/1; MG/1
TABLET ORAL
COMMUNITY

## 2023-01-09 RX ORDER — DICLOFENAC SODIUM 75 MG/1
TABLET, DELAYED RELEASE ORAL
COMMUNITY

## 2023-01-09 RX ORDER — FLUTICASONE PROPIONATE 50 MCG
SPRAY, SUSPENSION (ML) NASAL
COMMUNITY

## 2023-01-09 RX ORDER — AMOXICILLIN 500 MG/1
CAPSULE ORAL
COMMUNITY

## 2023-01-09 RX ORDER — ESOMEPRAZOLE MAGNESIUM 40 MG/1
CAPSULE, DELAYED RELEASE ORAL
COMMUNITY

## 2023-01-09 RX ORDER — NAPROXEN AND ESOMEPRAZOLE MAGNESIUM 20; 500 MG/1; MG/1
TABLET, DELAYED RELEASE ORAL
COMMUNITY

## 2023-01-09 RX ORDER — AZITHROMYCIN 500 MG/1
TABLET, FILM COATED ORAL
COMMUNITY

## 2023-01-09 RX ORDER — AMOXICILLIN AND CLAVULANATE POTASSIUM 875; 125 MG/1; MG/1
TABLET, FILM COATED ORAL
COMMUNITY

## 2023-01-09 NOTE — PROGRESS NOTES
1. Have you been to the ER, urgent care clinic since your last visit? Hospitalized since your last visit? No    2. Have you seen or consulted any other health care providers outside of the [address] [place] [address] since your last visit? Include any pap smears or colon screening.  No  Chief Complaint   Patient presents with    Follow-up     scan     Visit Vitals  BP (!) 158/84 (BP 1 Location: Left upper arm, BP Patient Position: Sitting, BP Cuff Size: Large adult)   Pulse 71   Temp 97.7 °F (36.5 °C) (Temporal)   Resp 18   Ht 5' (1.524 m)   Wt 158 lb 8 oz (71.9 kg)   SpO2 99%   BMI 30.95 kg/m²     Visit Vitals  BP (!) 149/73 (BP 1 Location: Left upper arm, BP Patient Position: Sitting, BP Cuff Size: Large adult)   Pulse 71   Temp 97.7 °F (36.5 °C) (Temporal)   Resp 18   Ht 5' (1.524 m)   Wt 158 lb 8 oz (71.9 kg)   SpO2 99%   BMI 30.95 kg/m²

## 2023-01-13 NOTE — PROGRESS NOTES
VASCULAR FOLLOW UP      Subjective:   CHIEF COMPLAINTS:  Left leg pain around the knee  PRESENTATION OF ILLNESS:  Jim Bee is a 65 y.o. very pleasant woman is here today for vascular assessment. Patient was sent from orthopedic service for reevaluation of the vascular status due to leg pain. Patient anticipated to have injection therapy. Patient described pain is more around the knee area. Patient denies recent trauma. Patient denies leg swelling. Patient also NEELA examination done as well as a venous duplex ultrasound findings discussed as below. Past Medical History:   Diagnosis Date    Arthritis     Hypertension     Knee pain, right     X 4-5 years      Past Surgical History:   Procedure Laterality Date    HX CARPAL TUNNEL RELEASE Right     aug 2020    HX ORTHOPAEDIC      Right knee, arthosocpy and cartilage removal    HX TOTAL ABDOMINAL HYSTERECTOMY      HX TUBAL LIGATION       Family History   Problem Relation Age of Onset    Hypertension Mother     Diabetes Mother     Hypertension Father       Social History     Tobacco Use    Smoking status: Never    Smokeless tobacco: Never   Substance Use Topics    Alcohol use: Never       Prior to Admission medications    Medication Sig Start Date End Date Taking? Authorizing Provider   aspirin delayed-release 81 mg tablet Take 81 mg by mouth daily. Yes Provider, Historical   estradioL (ESTRACE) 0.01 % (0.1 mg/gram) vaginal cream INSERT 1 GM VAGINALLY AT BEDTIME FOR 2 WEEKS THEN AT BEDTIME TWICE WEEKLY THEREAFTER 10/6/22  Yes Provider, Historical   linaCLOtide (LINZESS) 145 mcg cap capsule Linzess 145 mcg capsule   TAKE 1 CAPSULE DAILY AT DINNER   Yes Provider, Historical   lisinopriL (PRINIVIL, ZESTRIL) 20 mg tablet lisinopril 20 mg tablet   TAKE 1 TABLET EVERY DAY   Yes Provider, Historical   acetaminophen (TYLENOL) 325 mg tablet Take 3 Tablets by mouth every eight (8) hours as needed for Pain.  Indications: pain 6/29/21  Yes Michelle Arroyo PA-C cholecalciferol, vitamin D3, 50 mcg (2,000 unit) tab Take 1 Tablet by mouth daily. Yes Provider, Historical   multivit/folic acid/vit K1 (ONE-A-DAY WOMEN'S 50 PLUS PO) Take 1 Tablet by mouth daily. Yes Provider, Historical   amLODIPine (NORVASC) 5 mg tablet Take 5 mg by mouth daily. 2/16/21  Yes Other, MD Leni   amoxicillin (AMOXIL) 500 mg capsule amoxicillin 500 mg capsule   TAKE 1 CAPSULE BY MOUTH EVERY 6 HOURS  Patient not taking: Reported on 1/9/2023    Provider, Historical   amoxicillin-clavulanate (AUGMENTIN) 875-125 mg per tablet amoxicillin 875 mg-potassium clavulanate 125 mg tablet  Patient not taking: Reported on 1/9/2023    Provider, Historical   azithromycin (ZITHROMAX) 500 mg tab azithromycin 500 mg tablet   TAKE 1 TABLET BY MOUTH FOR 3 DAYS  Patient not taking: Reported on 1/9/2023    Provider, Historical   diclofenac EC (VOLTAREN) 75 mg EC tablet diclofenac sodium 75 mg tablet,delayed release  Patient not taking: Reported on 1/9/2023    Provider, Historical   docosahexanoic acid-eicosapent 120-180 mg cap Take  by mouth.     Provider, Historical   esomeprazole (NEXIUM) 40 mg capsule esomeprazole magnesium 40 mg capsule,delayed release   TAKE ONE CAPSULE BY MOUTH EVERY DAY    Provider, Historical   fluticasone propionate (FLONASE) 50 mcg/actuation nasal spray fluticasone propionate 50 mcg/actuation nasal spray,suspension   USE 1 SPRAY IN EACH NOSTRIL EVERY DAY  Patient not taking: Reported on 1/9/2023    Provider, Historical   gabapentin (NEURONTIN) 300 mg capsule gabapentin 300 mg capsule  Patient not taking: Reported on 1/9/2023    Provider, Historical   HYDROcodone-acetaminophen (NORCO) 5-325 mg per tablet hydrocodone 5 mg-acetaminophen 325 mg tablet  Patient not taking: Reported on 1/9/2023    Provider, Historical   ibuprofen (MOTRIN) 800 mg tablet ibuprofen 800 mg tablet   TAKE 1 TABLET BY MOUTH EVERY 8 HOURS AS NEEDED  Patient not taking: Reported on 1/9/2023    Provider, Historical naproxen (NAPROSYN) 500 mg tablet naproxen 500 mg tablet  Patient not taking: Reported on 1/9/2023    Provider, Historical   Naproxen-Esomeprazole Mag 500-20 mg TbID Vimovo 500 mg-20 mg tablet,immediate and delay release   Take 1 tablet every day by oral route. Patient not taking: Reported on 1/9/2023    Provider, Historical   oxyCODONE-acetaminophen (PERCOCET 10)  mg per tablet oxycodone-acetaminophen 10 mg-325 mg tablet   TAKE 1 TABLET EVERY 6 HOURS AS NEEDED  Patient not taking: Reported on 1/9/2023    Provider, Historical   oxyCODONE-acetaminophen (PERCOCET) 5-325 mg per tablet oxycodone-acetaminophen 5 mg-325 mg tablet   TAKE 1 TABLET BY MOUTH EVERY 4 HOURS AS NEEDED FOR PAIN  Patient not taking: Reported on 1/9/2023    Provider, Historical   predniSONE (DELTASONE) 10 mg tablet prednisone 10 mg tablet  Patient not taking: Reported on 1/9/2023    Provider, Historical   terconazole (TERAZOL 7) 0.4 % vaginal cream terconazole 0.4 % vaginal cream   INSERT 1 APPLICATORFUL VAGINALLY EVERY DAY AT BEDTIME FOR 7 DAYS. Patient not taking: Reported on 1/9/2023    Provider, Historical   meloxicam (MOBIC) 15 mg tablet Take 15 mg by mouth daily. Patient not taking: Reported on 1/9/2023 11/30/22   Provider, Historical   omeprazole (PRILOSEC) 20 mg capsule Take 1 Capsule by mouth daily. Patient not taking: Reported on 1/9/2023 10/5/22   Provider, Historical   diphenhydrAMINE hcl (BenadryL) 2 % topical gel Apply  to affected area every six (6) hours as needed for Skin Irritation or Itching. Patient not taking: Reported on 1/9/2023 12/21/22   Leandra Masters MD   triamcinolone acetonide (KENALOG) 0.1 % topical cream Apply  to affected area two (2) times a day. use thin layer  Patient not taking: Reported on 1/9/2023 12/21/22   Leandra Masters MD   ondansetron (Zofran ODT) 4 mg disintegrating tablet Take 1 Tablet by mouth every six (6) hours as needed for Nausea or Vomiting.  Indications: prevent nausea and vomiting after surgery  Patient not taking: Reported on 1/9/2023 6/29/21   Deandre Amarisavana ZAMORA PA-C   MITCH EXTRACT PO Take 1 Tablet by mouth daily. Patient not taking: Reported on 1/9/2023    Provider, Historical   fish oil-omega-3 fatty acids (Fish Oil) 300-500 mg cap Take 1 Caplet by mouth every other day. Patient not taking: Reported on 1/9/2023    Provider, Historical     Allergies   Allergen Reactions    Ibuprofen Other (comments)     Pt reports it makes her \"jittery\"        Review of Systems:  I reviewed the rest of organ systems personally and they were negative signed by Dr. Yan Chong    Objective:   Visit Vitals  BP (!) 149/73 (BP 1 Location: Left upper arm, BP Patient Position: Sitting, BP Cuff Size: Large adult)   Pulse 71   Temp 97.7 °F (36.5 °C) (Temporal)   Resp 18   Ht 5' (1.524 m)   Wt 158 lb 8 oz (71.9 kg)   SpO2 99%   BMI 30.95 kg/m²     VITAL SIGNS REVIEWED. Physical Exam:  Patient is well-nourished pleasant in conversation is appropriate. Head and neck examination atraumatic, normocephalic. Gaze appropriate. Conversation appropriate. Neck examination shows supple. No mass. No obvious carotid bruit. Chest examination shows lungs are clear bilaterally well-expanded, no crackles or wheezes. Cardiovascular system regular rate, no obvious murmur. Skin warm to touch  and moist, no skin lesions. Abdomen is soft ,not tender or distended bowel sounds present. No palpable mass. Neurological examinations, no focal neuro deficits moving all 4 extremities. Cranial nerves intact. Sensation is intact as well. Hematologic: No obvious bruise or swelling or obvious lymphadenopathy. Psychosocial: Appropriate. Has good effect. Musculoskeletal system: No muscle wasting, appropriate movements upper and lower extremity. Vascular examination: Clinical examination shows excellent triphasic signal noted left DP and PT.   Venous disorder shows CEAP C2 venous disorder        Data Review:   [facility] Outpatient Visit on 01/04/2023   Component Date Value Ref Range Status    Right ankle BP 01/04/2023 200  mmHg Final    Left ankle BP 01/04/2023 192  mmHg Final    Right dorsalis pedis BP 01/04/2023 200  mmHg Final    Left dorsalis pedis BP 01/04/2023 192  mmHg Final    Left arm BP 01/04/2023 156  mmHg Final    Right arm BP 01/04/2023 159  mmHg Final    Left posterior tibial 01/04/2023 184  mmHg Final    Right posterior tibial 01/04/2023 184  mmHg Final    Left NEELA 01/04/2023 1.21   Final    Left high thigh pressure 01/04/2023 220  mmHg Final    Right high thigh pressure 01/04/2023 214  mmHg Final    Left toe pressure 01/04/2023 145  mmHg Final    Right toe pressure 01/04/2023 128  mmHg Final    Left TBI 01/04/2023 0.91   Final    Right TBI 01/04/2023 0.81   Final    Right low thigh pressure 01/04/2023 212  mmHg Final    Right calf pressure 01/04/2023 197  mmHg Final    Left low thigh pressure 01/04/2023 219  mmHg Final    Left calf pressure 01/04/2023 188  mmHg Final    Right NEELA 01/04/2023 1.26   Final        Assessment:     Problem List Items Addressed This Visit    None          Plan:     Patient had NEELA examination right ankle index 1.26, TBI 0.8 on the left side ankle index is 1.21, TBI 0.9. Venous duplex ultrasound did not reveal any deep vein thrombosis. And clinically the patient has excellent perfusion on the left leg. Most likely patient's left knee problem is due to orthopedic issues. Patient was informed about the findings as discussed and gave her reassurance about her vascular status. Patient will be reexamined in 6-month follow-up.         Patrick Tbaor MD

## 2023-05-19 RX ORDER — LISINOPRIL 20 MG/1
TABLET ORAL
COMMUNITY

## 2023-05-19 RX ORDER — AMLODIPINE BESYLATE 5 MG/1
5 TABLET ORAL DAILY
COMMUNITY
Start: 2021-02-16

## 2023-05-19 RX ORDER — GABAPENTIN 300 MG/1
CAPSULE ORAL
COMMUNITY

## 2023-05-19 RX ORDER — OXYCODONE AND ACETAMINOPHEN 10; 325 MG/1; MG/1
TABLET ORAL
COMMUNITY

## 2023-05-19 RX ORDER — DICLOFENAC SODIUM 75 MG/1
TABLET, DELAYED RELEASE ORAL
COMMUNITY

## 2023-05-19 RX ORDER — CAMPHOR 0.45 %
GEL (GRAM) TOPICAL EVERY 6 HOURS PRN
COMMUNITY
Start: 2022-12-21

## 2023-05-19 RX ORDER — ESOMEPRAZOLE MAGNESIUM 40 MG/1
CAPSULE, DELAYED RELEASE ORAL
COMMUNITY

## 2023-05-19 RX ORDER — CHLORAL HYDRATE 500 MG
CAPSULE ORAL
COMMUNITY

## 2023-05-19 RX ORDER — NAPROXEN AND ESOMEPRAZOLE MAGNESIUM 20; 500 MG/1; MG/1
TABLET, DELAYED RELEASE ORAL
COMMUNITY

## 2023-05-19 RX ORDER — IBUPROFEN 800 MG/1
TABLET ORAL
COMMUNITY

## 2023-05-19 RX ORDER — AMOXICILLIN 500 MG/1
CAPSULE ORAL
COMMUNITY

## 2023-05-19 RX ORDER — NAPROXEN 500 MG/1
TABLET ORAL
COMMUNITY

## 2023-05-19 RX ORDER — MELOXICAM 15 MG/1
15 TABLET ORAL DAILY
COMMUNITY
Start: 2022-11-30

## 2023-05-19 RX ORDER — FLUTICASONE PROPIONATE 50 MCG
SPRAY, SUSPENSION (ML) NASAL
COMMUNITY

## 2023-05-19 RX ORDER — HYDROCODONE BITARTRATE AND ACETAMINOPHEN 5; 325 MG/1; MG/1
TABLET ORAL
COMMUNITY

## 2023-05-19 RX ORDER — ACETAMINOPHEN 325 MG/1
975 TABLET ORAL EVERY 8 HOURS PRN
COMMUNITY
Start: 2021-06-29

## 2023-05-19 RX ORDER — PREDNISONE 10 MG/1
TABLET ORAL
COMMUNITY

## 2023-05-19 RX ORDER — ASPIRIN 81 MG/1
81 TABLET ORAL DAILY
COMMUNITY

## 2023-05-19 RX ORDER — ONDANSETRON 4 MG/1
4 TABLET, ORALLY DISINTEGRATING ORAL EVERY 6 HOURS PRN
COMMUNITY
Start: 2021-06-29

## 2023-05-19 RX ORDER — ESTRADIOL 0.1 MG/G
CREAM VAGINAL
COMMUNITY
Start: 2022-10-06

## 2023-05-19 RX ORDER — AZITHROMYCIN 500 MG/1
TABLET, FILM COATED ORAL
COMMUNITY

## 2023-05-19 RX ORDER — AMOXICILLIN AND CLAVULANATE POTASSIUM 875; 125 MG/1; MG/1
TABLET, FILM COATED ORAL
COMMUNITY

## 2023-05-19 RX ORDER — OXYCODONE HYDROCHLORIDE AND ACETAMINOPHEN 5; 325 MG/1; MG/1
TABLET ORAL
COMMUNITY

## 2023-05-19 RX ORDER — TRIAMCINOLONE ACETONIDE 1 MG/G
CREAM TOPICAL 2 TIMES DAILY
COMMUNITY
Start: 2022-12-21

## 2023-05-19 RX ORDER — OMEPRAZOLE 20 MG/1
1 CAPSULE, DELAYED RELEASE ORAL DAILY
COMMUNITY
Start: 2022-10-05

## 2024-06-20 ENCOUNTER — TRANSCRIBE ORDERS (OUTPATIENT)
Facility: HOSPITAL | Age: 59
End: 2024-06-20

## 2024-06-20 ENCOUNTER — HOSPITAL ENCOUNTER (OUTPATIENT)
Facility: HOSPITAL | Age: 59
Discharge: HOME | End: 2024-06-20
Payer: OTHER GOVERNMENT

## 2024-06-20 DIAGNOSIS — M24.662 ANKYLOSIS OF LEFT KNEE: ICD-10-CM

## 2024-06-20 DIAGNOSIS — Z96.652 PRESENCE OF LEFT ARTIFICIAL KNEE JOINT: Primary | ICD-10-CM

## 2024-06-20 LAB
BASOPHILS # BLD: 0 K/UL (ref 0–0.1)
BASOPHILS NFR BLD: 1 % (ref 0–1)
CRP SERPL-MCNC: 0.44 MG/DL (ref 0–0.3)
DIFFERENTIAL METHOD BLD: NORMAL
EOSINOPHIL # BLD: 0.1 K/UL (ref 0–0.4)
EOSINOPHIL NFR BLD: 1 % (ref 0–7)
ERYTHROCYTE [DISTWIDTH] IN BLOOD BY AUTOMATED COUNT: 13.2 % (ref 11.5–14.5)
ERYTHROCYTE [SEDIMENTATION RATE] IN BLOOD: 42 MM/HR
HCT VFR BLD AUTO: 38.1 % (ref 35–47)
HGB BLD-MCNC: 12.6 G/DL (ref 11.5–16)
IMM GRANULOCYTES # BLD AUTO: 0 K/UL (ref 0–0.04)
IMM GRANULOCYTES NFR BLD AUTO: 0 % (ref 0–0.5)
LYMPHOCYTES # BLD: 0.9 K/UL (ref 0.8–3.5)
LYMPHOCYTES NFR BLD: 18 % (ref 12–49)
MCH RBC QN AUTO: 30.1 PG (ref 26–34)
MCHC RBC AUTO-ENTMCNC: 33.1 G/DL (ref 30–36.5)
MCV RBC AUTO: 91.1 FL (ref 80–99)
MONOCYTES # BLD: 0.3 K/UL (ref 0–1)
MONOCYTES NFR BLD: 7 % (ref 5–13)
NEUTS SEG # BLD: 3.6 K/UL (ref 1.8–8)
NEUTS SEG NFR BLD: 73 % (ref 32–75)
NRBC # BLD: 0 K/UL (ref 0–0.01)
NRBC BLD-RTO: 0 PER 100 WBC
PLATELET # BLD AUTO: 263 K/UL (ref 150–400)
PMV BLD AUTO: 9.5 FL (ref 8.9–12.9)
RBC # BLD AUTO: 4.18 M/UL (ref 3.8–5.2)
WBC # BLD AUTO: 4.9 K/UL (ref 3.6–11)

## 2024-06-20 PROCEDURE — 85025 COMPLETE CBC W/AUTO DIFF WBC: CPT

## 2024-06-20 PROCEDURE — 86140 C-REACTIVE PROTEIN: CPT

## 2024-06-20 PROCEDURE — 36415 COLL VENOUS BLD VENIPUNCTURE: CPT

## 2024-06-20 PROCEDURE — 85652 RBC SED RATE AUTOMATED: CPT

## 2024-06-23 ENCOUNTER — TRANSCRIBE ORDERS (OUTPATIENT)
Facility: HOSPITAL | Age: 59
End: 2024-06-23

## 2024-06-23 DIAGNOSIS — M24.662 ARTHROFIBROSIS OF KNEE JOINT, LEFT: Primary | ICD-10-CM

## 2024-06-28 ENCOUNTER — HOSPITAL ENCOUNTER (OUTPATIENT)
Facility: HOSPITAL | Age: 59
Discharge: HOME | End: 2024-06-28
Attending: ORTHOPAEDIC SURGERY
Payer: OTHER GOVERNMENT

## 2024-06-28 DIAGNOSIS — M24.662 ARTHROFIBROSIS OF KNEE JOINT, LEFT: ICD-10-CM

## 2024-06-28 PROCEDURE — 73700 CT LOWER EXTREMITY W/O DYE: CPT

## (undated) DEVICE — SUTURE VCRL + SZ 2-0 L27IN ABSRB UD CP-1 1/2 CIR REV CUT VCP266H

## (undated) DEVICE — COVER,TABLE,60X90,STERILE: Brand: MEDLINE

## (undated) DEVICE — SOLUTION IRRIG 1000ML STRL H2O USP PLAS POUR BTL

## (undated) DEVICE — 3M™ STERI-DRAPE™ U-DRAPE 1067 1067 5/BX 4BX/CS/CTN&#X20;: Brand: STERI-DRAPE™

## (undated) DEVICE — INTENDED FOR TISSUE SEPARATION, AND OTHER PROCEDURES THAT REQUIRE A SHARP SURGICAL BLADE TO PUNCTURE OR CUT.: Brand: BARD-PARKER ® CARBON RIB-BACK BLADES

## (undated) DEVICE — LAMINECTOMY ARM CRADLE FOAM POSITIONER: Brand: CARDINAL HEALTH

## (undated) DEVICE — CEMENT MIXING SYSTEM WITH FEMORAL BREAKWAY NOZZLE: Brand: REVOLUTION

## (undated) DEVICE — SUT VCRL + 1 27IN OS6 VIO --

## (undated) DEVICE — RECIPROCATING BLADE HEAVY DUTY LONG, OFFSET  (77.6 X 0.77 X 11.2MM)

## (undated) DEVICE — 4-PORT MANIFOLD: Brand: NEPTUNE 2

## (undated) DEVICE — HANDPIECE SET WITH HIGH FLOW TIP AND SUCTION TUBE: Brand: INTERPULSE

## (undated) DEVICE — PREP SKN CHLRAPRP APL 26ML STR --

## (undated) DEVICE — BIT DRL QR TOT KNEE 1/8IN SS -- DISP STRL 2/PK

## (undated) DEVICE — Z CONVERTED USE 2271386 BANDAGE COMPR W6INXL11YD WVN COTTON/ELASTIC CLP CLSR DBL

## (undated) DEVICE — 3M™ COBAN™ NL STERILE NON-LATEX SELF-ADHERENT WRAP, 2084S, 4 IN X 5 YD (10 CM X 4,5 M), 18 ROLLS/CASE: Brand: 3M™ COBAN™

## (undated) DEVICE — 3M™ STERI-DRAPE™ U-DRAPE 1015: Brand: STERI-DRAPE™

## (undated) DEVICE — PICO 7 10CM X 30CM: Brand: PICO™ 7

## (undated) DEVICE — STERILE POLYISOPRENE POWDER-FREE SURGICAL GLOVES: Brand: PROTEXIS

## (undated) DEVICE — ARMBRD IV STRP 1.5X32IN FOAM -- DISP

## (undated) DEVICE — MAJOR ORTHO PROCEDURE PACK: Brand: MEDLINE INDUSTRIES, INC.

## (undated) DEVICE — 450 ML BOTTLE OF 0.05% CHLORHEXIDINE GLUCONATE IN 99.95% STERILE WATER FOR IRRIGATION, USP AND APPLICATOR.: Brand: IRRISEPT ANTIMICROBIAL WOUND LAVAGE

## (undated) DEVICE — OSCILLATING TIP SAW CARTRIDGE: Brand: PRECISION FALCON

## (undated) DEVICE — BASIC SINGLE BASIN-LF: Brand: MEDLINE INDUSTRIES, INC.

## (undated) DEVICE — PADDING CST 6IN STERILE --

## (undated) DEVICE — 3M™ IOBAN™ 2 ANTIMICROBIAL INCISE DRAPE 6651EZ: Brand: IOBAN™ 2

## (undated) DEVICE — ZIMMER® STERILE DISPOSABLE TOURNIQUET CUFF WITH PROTECTIVE SLEEVE AND PLC, DUAL PORT, SINGLE BLADDER, 34 IN. (86 CM)

## (undated) DEVICE — ALCOHOL  RUBBING  70% ISOPROPYL  4-OZ

## (undated) DEVICE — SOLUTION IRRIG 500ML 0.9% SOD CHL USP POUR PLAS BTL

## (undated) DEVICE — HOOD, PEEL-AWAY: Brand: FLYTE

## (undated) DEVICE — STERILE POLYISOPRENE POWDER-FREE SURGICAL GLOVES WITH EMOLLIENT COATING: Brand: PROTEXIS

## (undated) DEVICE — PADDING CAST W6INXL4YD ST COT COHESIVE HND TEARABLE SPEC

## (undated) DEVICE — GARMENT COMPR REG WOM SZ 9 M SZ 7 FT NONSTERILE FLOTRN

## (undated) DEVICE — TOTAL TRAY, 16FR 10ML SIL FOLEY, URN: Brand: MEDLINE

## (undated) DEVICE — SOUTHSIDE TURNOVER: Brand: MEDLINE INDUSTRIES, INC.

## (undated) DEVICE — GARMENT,MEDLINE,DVT,INT,CALF,MED, GEN2: Brand: MEDLINE

## (undated) DEVICE — GAUZE,SPONGE,4"X4",16PLY,STRL,LF,10/TRAY: Brand: MEDLINE